# Patient Record
Sex: FEMALE | Race: WHITE | NOT HISPANIC OR LATINO | Employment: UNEMPLOYED | URBAN - METROPOLITAN AREA
[De-identification: names, ages, dates, MRNs, and addresses within clinical notes are randomized per-mention and may not be internally consistent; named-entity substitution may affect disease eponyms.]

---

## 2017-01-05 ENCOUNTER — TELEPHONE (OUTPATIENT)
Dept: MEDICAL GROUP | Facility: MEDICAL CENTER | Age: 57
End: 2017-01-05

## 2017-01-05 DIAGNOSIS — L40.9 PSORIASIS: ICD-10-CM

## 2017-01-05 RX ORDER — CLOBETASOL PROPIONATE 0.46 MG/ML
SOLUTION TOPICAL
Qty: 50 ML | Refills: 2 | Status: SHIPPED
Start: 2017-01-05 | End: 2018-02-15

## 2017-01-05 RX ORDER — CLOBETASOL PROPIONATE 0.05 G/100ML
1 SHAMPOO TOPICAL DAILY
Qty: 1 BOTTLE | Refills: 3 | Status: SHIPPED | OUTPATIENT
Start: 2017-01-05 | End: 2018-02-15

## 2017-01-06 NOTE — TELEPHONE ENCOUNTER
Please notify the patient that the clobetasol shampoo is not covered under insurance, I am going to try a different clobetasol formulation and send that prescription to her pharmacy

## 2017-01-06 NOTE — TELEPHONE ENCOUNTER
Pt says it is months before she can get into [facility]. Would like an Rx for something for her scalp. Says she tried the shampoo and was not satisfied. In the past has tried Clydamyacin and Clobet? Would be willing to try either of those topicals until she can get into [facility]. Please advise.

## 2017-02-15 PROBLEM — R73.01 IMPAIRED FASTING GLUCOSE: Status: ACTIVE | Noted: 2017-02-15

## 2017-02-16 ENCOUNTER — TELEPHONE (OUTPATIENT)
Dept: MEDICAL GROUP | Facility: MEDICAL CENTER | Age: 57
End: 2017-02-16

## 2017-02-16 NOTE — TELEPHONE ENCOUNTER
Spoke with [facility] and they did not receive specimen so they didn't run test. Pt will need to be redrawn.

## 2017-02-16 NOTE — TELEPHONE ENCOUNTER
----- Message from Jordan Rand M.D. sent at 2/15/2017  8:19 PM PST -----  Please call [facility], the patient had labs done on December 6, 2016, I had ordered a lipid panel but the result was not sent to us.  Could they please check if that test was done? Specimen number [id number], client number [id number]          Called patient with results for labs that she had done in December at [facility], apologized for the delay, because of a computer system which we were not forwarded the results to our in basket.    Liver enzymes mildly elevated, impaired fasting blood sugar related to weight, she would like to try weight loss medication, advised her to check if belviq is covered under her plan, she can schedule follow-up to discuss weight loss medication.  Work on nutrition, exercise.  Take vitamin D 2000 units daily.    We will need to call [facility] to see if the lipid panel was done, it had been ordered

## 2017-02-16 NOTE — TELEPHONE ENCOUNTER
Please notify patient that [facility] did not run the cholesterol specimen test.    Since her previous cholesterol panels have been excellent, we can hold off on repeating that for now, the next time she has blood drawn we can order the cholesterol test at that time.

## 2017-02-27 ENCOUNTER — TELEPHONE (OUTPATIENT)
Dept: MEDICAL GROUP | Facility: MEDICAL CENTER | Age: 57
End: 2017-02-27

## 2017-02-27 DIAGNOSIS — F32.A DEPRESSION, UNSPECIFIED DEPRESSION TYPE: Chronic | ICD-10-CM

## 2017-02-27 RX ORDER — BUPROPION HYDROCHLORIDE 150 MG/1
150 TABLET ORAL 2 TIMES DAILY
Qty: 60 TAB | Refills: 6 | Status: SHIPPED | OUTPATIENT
Start: 2017-02-27 | End: 2017-03-13

## 2017-02-27 NOTE — TELEPHONE ENCOUNTER
1. Caller Name: Ramona                      Call Back Number: [phone]    2. Message: Pt left message stating she has been taking Wellbutrin BID and wants to continue this dose. She would like refill sent to [facility].     3. Patient approves office to leave a detailed voicemail/MyChart message: N\A

## 2017-03-01 ENCOUNTER — TELEPHONE (OUTPATIENT)
Dept: MEDICAL GROUP | Facility: MEDICAL CENTER | Age: 57
End: 2017-03-01

## 2017-03-01 NOTE — TELEPHONE ENCOUNTER
need par please  (!) wellbutrin  mg bid  (2) #60 tab per 30 days  (3) diagnosis: depression  (4) ICD 10 code: F32.9  (5) comments: tried and failed celexa, effexor, has been on wellbutrin 150 mg with benefit initially started at 150 mg daily but has had benefit with 150 mg twice a day dosing

## 2017-03-12 NOTE — TELEPHONE ENCOUNTER
Letter from [employer] asking us to call [facility] pharmacy services at [phone] or fax [phone] to resubmit PAR request for Wellbutrin  Letter is on the counter

## 2017-03-13 ENCOUNTER — TELEPHONE (OUTPATIENT)
Dept: MEDICAL GROUP | Facility: MEDICAL CENTER | Age: 57
End: 2017-03-13

## 2017-03-13 DIAGNOSIS — F32.A DEPRESSION, UNSPECIFIED DEPRESSION TYPE: Chronic | ICD-10-CM

## 2017-03-13 RX ORDER — BUPROPION HYDROCHLORIDE 300 MG/1
300 TABLET ORAL EVERY MORNING
Qty: 90 TAB | Refills: 3 | Status: SHIPPED | OUTPATIENT
Start: 2017-03-13 | End: 2020-03-03

## 2017-03-14 NOTE — TELEPHONE ENCOUNTER
Please notify patient that her insurance does not cover bupropion  mg twice a day, they will cover bupropion  mg once a day, that prescription was sent to her pharmacy

## 2017-06-06 PROBLEM — L40.8 OTHER PSORIASIS: Status: ACTIVE | Noted: 2017-06-06

## 2017-11-20 ENCOUNTER — TELEPHONE (OUTPATIENT)
Dept: MEDICAL GROUP | Facility: MEDICAL CENTER | Age: 57
End: 2017-11-20

## 2017-11-20 DIAGNOSIS — F32.A DEPRESSION, UNSPECIFIED DEPRESSION TYPE: ICD-10-CM

## 2017-11-21 NOTE — TELEPHONE ENCOUNTER
1. Caller Name: Ramona Wilson                        Call Back Number: [phone] (home)       2. Message: Pt has decided that she wants to see mental Health. Would like a REF to Dr Navas @ [facility] on [address]     3. Patient approves office to leave a detailed voicemail/MyChart message: no

## 2018-01-05 ENCOUNTER — APPOINTMENT (OUTPATIENT)
Dept: RADIOLOGY | Facility: IMAGING CENTER | Age: 58
End: 2018-01-05
Attending: FAMILY MEDICINE
Payer: COMMERCIAL

## 2018-01-05 ENCOUNTER — OFFICE VISIT (OUTPATIENT)
Dept: URGENT CARE | Facility: CLINIC | Age: 58
End: 2018-01-05
Payer: COMMERCIAL

## 2018-01-05 VITALS
HEART RATE: 84 BPM | HEIGHT: 62 IN | TEMPERATURE: 97.6 F | OXYGEN SATURATION: 98 % | SYSTOLIC BLOOD PRESSURE: 128 MMHG | DIASTOLIC BLOOD PRESSURE: 76 MMHG | RESPIRATION RATE: 14 BRPM | WEIGHT: 193 LBS | BODY MASS INDEX: 35.51 KG/M2

## 2018-01-05 DIAGNOSIS — R50.9 FEVER, UNSPECIFIED FEVER CAUSE: ICD-10-CM

## 2018-01-05 LAB
APPEARANCE UR: NORMAL
BILIRUB UR STRIP-MCNC: NORMAL MG/DL
COLOR UR AUTO: YELLOW
FLUAV+FLUBV AG SPEC QL IA: NEGATIVE
GLUCOSE UR STRIP.AUTO-MCNC: NORMAL MG/DL
INT CON NEG: NORMAL
INT CON POS: NORMAL
KETONES UR STRIP.AUTO-MCNC: NORMAL MG/DL
LEUKOCYTE ESTERASE UR QL STRIP.AUTO: NORMAL
NITRITE UR QL STRIP.AUTO: NORMAL
PH UR STRIP.AUTO: 5 [PH] (ref 5–8)
PROT UR QL STRIP: NORMAL MG/DL
RBC UR QL AUTO: NORMAL
SP GR UR STRIP.AUTO: 1.01
UROBILINOGEN UR STRIP-MCNC: NORMAL MG/DL

## 2018-01-05 PROCEDURE — 81002 URINALYSIS NONAUTO W/O SCOPE: CPT | Performed by: FAMILY MEDICINE

## 2018-01-05 PROCEDURE — 99214 OFFICE O/P EST MOD 30 MIN: CPT | Performed by: FAMILY MEDICINE

## 2018-01-05 PROCEDURE — 71046 X-RAY EXAM CHEST 2 VIEWS: CPT | Mod: TC,FY | Performed by: FAMILY MEDICINE

## 2018-01-05 PROCEDURE — 87804 INFLUENZA ASSAY W/OPTIC: CPT | Performed by: FAMILY MEDICINE

## 2018-01-05 RX ORDER — CIPROFLOXACIN 500 MG/1
500 TABLET, FILM COATED ORAL 2 TIMES DAILY
Qty: 14 TAB | Refills: 0 | Status: SHIPPED | OUTPATIENT
Start: 2018-01-05 | End: 2018-01-12

## 2018-01-05 RX ORDER — DEXTROAMPHETAMINE SACCHARATE, AMPHETAMINE ASPARTATE, DEXTROAMPHETAMINE SULFATE AND AMPHETAMINE SULFATE 2.5; 2.5; 2.5; 2.5 MG/1; MG/1; MG/1; MG/1
5 TABLET ORAL 2 TIMES DAILY
COMMUNITY
End: 2019-01-10

## 2018-01-05 ASSESSMENT — ENCOUNTER SYMPTOMS
HEADACHES: 0
CHILLS: 1
FOCAL WEAKNESS: 0
FEVER: 1
DIZZINESS: 0
SORE THROAT: 1
COUGH: 0
SPUTUM PRODUCTION: 0

## 2018-01-05 NOTE — PROGRESS NOTES
Subjective:      Ramona Wilson is a 57 y.o. female who presents with Fever    Chief Complaint   Patient presents with   • Fever        - This is a very pleasant 57 y.o. female with complaints of on off subjective fever, aches malaise. No cough/coryza or urinary symptoms past few days but did have some cough w/ sputum today. She is worried about hepatitis, TB and other exotic infectious diseases and wishes to be worked up today b/c she cant get into her Dr's office.           ALLERGIES:  Amoxicillin     PMH:  Past Medical History:   Diagnosis Date   • Anxiety 4/21/2009   • Chronic low back pain 4/21/2009   • Dyslipidemia 4/21/2009   • Eczema 4/21/2009   • Hypovitaminosis D 4/23/2009   • RLS (restless legs syndrome)    • Sleep apnea    • Surveillance for birth control, oral contraceptives 4/21/2009        MEDS:    Current Outpatient Prescriptions:   •  amphetamine-dextroamphetamine (ADDERALL) 10 MG Tab, Take 5 mg by mouth 2 times a day., Disp: , Rfl:   •  ciprofloxacin (CIPRO) 500 MG Tab, Take 1 Tab by mouth 2 times a day for 7 days., Disp: 14 Tab, Rfl: 0  •  buPROPion (WELLBUTRIN XL) 300 MG XL tablet, Take 1 Tab by mouth every morning., Disp: 90 Tab, Rfl: 3  •  clobetasol (TEMOVATE) 0.05 % external solution, Apply thin film to dry scalp once daily (maximum dose: 50 g/week or 50 mL/week); leave in place for 15 minutes, then add water, lather; rinse thoroughly. Limit treatment to 4 consecutive weeks., Disp: 50 mL, Rfl: 2  •  Cholecalciferol (VITAMIN D) 1000 UNIT CAPS, Take  by mouth every day., Disp: , Rfl:   •  Clobetasol Propionate 0.05 % Shampoo, 1 Application by Apply externally route every day. As needed for scalp irritation, Disp: 1 Bottle, Rfl: 3  •  venlafaxine (EFFEXOR-XR) 150 MG extended-release capsule, Take 1 Cap by mouth every day., Disp: 90 Cap, Rfl: 3  •  ibuprofen (MOTRIN) 200 MG TABS, Take 400 mg by mouth every 6 hours as needed., Disp: , Rfl:     ** I have documented what I find to be significant  "in regards to past medical, social, family and surgical history  in my HPI or under PMH/PSH/FH review section, otherwise it is contributory **           HPI    Review of Systems   Constitutional: Positive for chills, fever and malaise/fatigue.   HENT: Positive for congestion and sore throat.    Respiratory: Negative for cough and sputum production.    Neurological: Negative for dizziness, focal weakness and headaches.          Objective:     /76   Pulse 84   Temp 36.4 °C (97.6 °F)   Resp 14   Ht 1.575 m (5' 2\")   Wt 87.5 kg (193 lb)   SpO2 98%   BMI 35.30 kg/m²      Physical Exam   Constitutional: She appears well-developed. No distress.   HENT:   Head: Normocephalic and atraumatic.   Mouth/Throat: Oropharynx is clear and moist.   Eyes: Conjunctivae are normal.   Neck: Neck supple.   Cardiovascular: Regular rhythm.    No murmur heard.  Pulmonary/Chest: Effort normal and breath sounds normal. No respiratory distress.   Abdominal: Soft. There is no tenderness.   Neurological: She is alert. She exhibits normal muscle tone.   Skin: Skin is warm and dry.   Psychiatric: She has a normal mood and affect. Judgment normal.   Nursing note and vitals reviewed.              Assessment/Plan:         1. Fever, unspecified fever cause  DX-CHEST-2 VIEWS    POCT Urinalysis    POCT Influenza A/B    URINE CULTURE(NEW)    CBC WITH DIFFERENTIAL    COMP METABOLIC PANEL    TSH    ciprofloxacin (CIPRO) 500 MG Tab       * I suspect UTI.       Dx & d/c instructions discussed w/ patient and/or family members. Follow up w/ Prvt Dr in 3 days if not getting better, sooner if needed,  ER if worse and UC/PCP unavailable.        Possible side effects (i.e. Rash, GI upset/constipation, sedation, elevation of BP or sugars) of any medications given discussed.                "

## 2018-01-13 ENCOUNTER — TELEPHONE (OUTPATIENT)
Dept: MEDICAL GROUP | Facility: MEDICAL CENTER | Age: 58
End: 2018-01-13

## 2018-01-13 RX ORDER — VENLAFAXINE HYDROCHLORIDE 150 MG/1
150 CAPSULE, EXTENDED RELEASE ORAL DAILY
Qty: 90 CAP | Refills: 0 | Status: SHIPPED | OUTPATIENT
Start: 2018-01-13 | End: 2018-02-15

## 2018-01-14 NOTE — TELEPHONE ENCOUNTER
Please notify patient she has not been seen since December 2016, have her schedule a follow-up annual exam

## 2018-01-16 ENCOUNTER — OFFICE VISIT (OUTPATIENT)
Dept: MEDICAL GROUP | Facility: MEDICAL CENTER | Age: 58
End: 2018-01-16
Payer: COMMERCIAL

## 2018-01-16 VITALS
OXYGEN SATURATION: 98 % | DIASTOLIC BLOOD PRESSURE: 90 MMHG | TEMPERATURE: 97.8 F | HEIGHT: 62 IN | BODY MASS INDEX: 35.04 KG/M2 | SYSTOLIC BLOOD PRESSURE: 134 MMHG | WEIGHT: 190.4 LBS | HEART RATE: 87 BPM

## 2018-01-16 DIAGNOSIS — R10.84 GENERALIZED ABDOMINAL DISCOMFORT: ICD-10-CM

## 2018-01-16 DIAGNOSIS — Z23 NEED FOR INFLUENZA VACCINATION: ICD-10-CM

## 2018-01-16 DIAGNOSIS — N39.0 ACUTE UTI (URINARY TRACT INFECTION): ICD-10-CM

## 2018-01-16 LAB
APPEARANCE UR: CLEAR
BILIRUB UR STRIP-MCNC: NORMAL MG/DL
COLOR UR AUTO: YELLOW
GLUCOSE UR STRIP.AUTO-MCNC: NEGATIVE MG/DL
KETONES UR STRIP.AUTO-MCNC: NEGATIVE MG/DL
LEUKOCYTE ESTERASE UR QL STRIP.AUTO: NORMAL
NITRITE UR QL STRIP.AUTO: NEGATIVE
PH UR STRIP.AUTO: 5 [PH] (ref 5–8)
PROT UR QL STRIP: NEGATIVE MG/DL
RBC UR QL AUTO: NORMAL
SP GR UR STRIP.AUTO: 1.02
UROBILINOGEN UR STRIP-MCNC: NEGATIVE MG/DL

## 2018-01-16 PROCEDURE — 90686 IIV4 VACC NO PRSV 0.5 ML IM: CPT | Performed by: NURSE PRACTITIONER

## 2018-01-16 PROCEDURE — 90471 IMMUNIZATION ADMIN: CPT | Performed by: NURSE PRACTITIONER

## 2018-01-16 PROCEDURE — 81002 URINALYSIS NONAUTO W/O SCOPE: CPT | Performed by: NURSE PRACTITIONER

## 2018-01-16 PROCEDURE — 99214 OFFICE O/P EST MOD 30 MIN: CPT | Mod: 25 | Performed by: NURSE PRACTITIONER

## 2018-01-16 RX ORDER — SULFAMETHOXAZOLE AND TRIMETHOPRIM 800; 160 MG/1; MG/1
1 TABLET ORAL 2 TIMES DAILY
Qty: 10 TAB | Refills: 0 | Status: SHIPPED | OUTPATIENT
Start: 2018-01-16 | End: 2018-02-15

## 2018-01-16 NOTE — PROGRESS NOTES
Subjective:     Chief Complaint   Patient presents with   • Sweats     worried about liver enzymes   • Follow-Up     urine     Ramona Wilson is a 57 y.o. female established patient of Dr. Rand here to follow-up on recent urgent care visit. She was seen in urgent care January 5 with generalized abdominal discomfort, treated for suspected UTI with Cipro. She has had some improvement in symptoms although she does not feel quite back to normal. She is still having periods of sweating and chills. She states that she's had elevated liver enzymes in the past and was concerned about this. She no longer has any abdominal pain. She is eating and drinking normally. No dysuria, hematuria, nausea, vomiting. No fatigue or unusual bruising. Culture results reviewed, no significant growth.  She has lab orders not yet completed including liver enzymes.   She would like influenza vaccine     Current medicines (including changes today)  Current Outpatient Prescriptions   Medication Sig Dispense Refill   • sulfamethoxazole-trimethoprim (BACTRIM DS) 800-160 MG tablet Take 1 Tab by mouth 2 times a day. 10 Tab 0   • amphetamine-dextroamphetamine (ADDERALL) 10 MG Tab Take 5 mg by mouth 2 times a day.     • buPROPion (WELLBUTRIN XL) 300 MG XL tablet Take 1 Tab by mouth every morning. 90 Tab 3   • Cholecalciferol (VITAMIN D) 1000 UNIT CAPS Take  by mouth every day.     • venlafaxine (EFFEXOR-XR) 150 MG extended-release capsule Take 1 Cap by mouth every day. 90 Cap 0   • Clobetasol Propionate 0.05 % Shampoo 1 Application by Apply externally route every day. As needed for scalp irritation 1 Bottle 3   • clobetasol (TEMOVATE) 0.05 % external solution Apply thin film to dry scalp once daily (maximum dose: 50 g/week or 50 mL/week); leave in place for 15 minutes, then add water, lather; rinse thoroughly. Limit treatment to 4 consecutive weeks. 50 mL 2   • ibuprofen (MOTRIN) 200 MG TABS Take 400 mg by mouth every 6 hours as needed.    "    No current facility-administered medications for this visit.      She  has a past medical history of Anxiety (4/21/2009); Chronic low back pain (4/21/2009); Dyslipidemia (4/21/2009); Eczema (4/21/2009); Hypovitaminosis D (4/23/2009); RLS (restless legs syndrome); Sleep apnea; and Surveillance for birth control, oral contraceptives (4/21/2009).    ROS included above     Objective:     Blood pressure 134/90, pulse 87, temperature 36.6 °C (97.8 °F), height 1.575 m (5' 2\"), weight 86.4 kg (190 lb 6.4 oz), SpO2 98 %, not currently breastfeeding. Body mass index is 34.82 kg/m².     Physical Exam:  General: Alert, oriented in no acute distress.  Eye contact is good, speech is normal, affect calm  Lungs: clear to auscultation bilaterally, normal effort, no wheeze/ rhonchi/ rales.  CV: regular rate and rhythm, S1, S2, no murmur  Abdomen: soft, nontender, No CVAT, central obesity  Ext: no edema, color normal, vascularity normal, temperature normal    Assessment and Plan:   The following treatment plan was discussed   1. Acute UTI (urinary tract infection)  Initially seen Jan 5th and treated with cipro for suspected UTI, culture was negative. UA today with blood, leukocytes, bilirubin. Will start bactrim to cover for potential UTI, send urine for culture. She will complete labs as previously ordered to include liver enzymes. F/u pending culture   2. Generalized abdominal discomfort  POCT Urinalysis    URINE CULTURE(NEW)   3. Need for influenza vaccination  I have placed the below orders and discussed them with an approved delegating provider. The MA is performing the below orders under the direction of Dr. Boyle  Flu Quad Inj >3 Year Pre-Filled PF       Followup: pending labs         Please note that this dictation was created using voice recognition software. I have worked with consultants from the vendor as well as technical experts from [facility] to optimize the interface. I have made every reasonable attempt to " correct obvious errors, but I expect that there are errors of grammar and possibly content that I did not discover before finalizing the note.

## 2018-01-26 ENCOUNTER — TELEPHONE (OUTPATIENT)
Dept: MEDICAL GROUP | Facility: MEDICAL CENTER | Age: 58
End: 2018-01-26

## 2018-01-26 DIAGNOSIS — R94.5 ABNORMAL LIVER FUNCTION: Chronic | ICD-10-CM

## 2018-01-26 NOTE — TELEPHONE ENCOUNTER
Those were ordered by urgent care. One of her liver tests showed minimal liver inflammation and the rest of her tests were normal.     I have not seen her in over a year, she can schedule an annual exam appointment.

## 2018-01-26 NOTE — TELEPHONE ENCOUNTER
Please inform pt urine culture did not show infection  If still having discomfort I would recommend we check lab work and get her back in for reevaluation

## 2018-01-26 NOTE — TELEPHONE ENCOUNTER
Pt notified. She would like results for the rest of her results. Results requested and are being faxed. She will make an appt next week if still having discomfort.

## 2018-01-26 NOTE — TELEPHONE ENCOUNTER
1. Caller Name: Ramona Wilson                        Call Back Number: [phone] (home)       2. Message: Pt called for lab and urine results. Urine in chart, no labs.     3. Patient approves office to leave a detailed voicemail/MyChart message: no

## 2018-01-27 ENCOUNTER — TELEPHONE (OUTPATIENT)
Dept: URGENT CARE | Facility: CLINIC | Age: 58
End: 2018-01-27

## 2018-01-27 NOTE — TELEPHONE ENCOUNTER
Call (document call) and let patient know labs for most part were in normal range, slight elevation in liver enzymes, could be nonspecific. F/u w/ PCP as planned to look into this further

## 2018-01-29 ENCOUNTER — TELEPHONE (OUTPATIENT)
Dept: URGENT CARE | Facility: CLINIC | Age: 58
End: 2018-01-29

## 2018-01-29 NOTE — TELEPHONE ENCOUNTER
Call (document call) and let patient know labs for most part were in normal range, slight elevation in liver enzymes, could be nonspecific. F/u w/ PCP as planned to look into this further       Pt states understanding

## 2018-01-29 NOTE — TELEPHONE ENCOUNTER
lvm       Call (document call) and let patient know labs for most part were in normal range, slight elevation in liver enzymes, could be nonspecific. F/u w/ PCP as planned to look into this further

## 2018-02-15 ENCOUNTER — HOSPITAL ENCOUNTER (OUTPATIENT)
Facility: MEDICAL CENTER | Age: 58
End: 2018-02-15
Attending: INTERNAL MEDICINE
Payer: COMMERCIAL

## 2018-02-15 ENCOUNTER — OFFICE VISIT (OUTPATIENT)
Dept: MEDICAL GROUP | Facility: MEDICAL CENTER | Age: 58
End: 2018-02-15
Payer: COMMERCIAL

## 2018-02-15 VITALS
TEMPERATURE: 98.3 F | BODY MASS INDEX: 34.41 KG/M2 | HEART RATE: 80 BPM | WEIGHT: 187 LBS | HEIGHT: 62 IN | SYSTOLIC BLOOD PRESSURE: 126 MMHG | DIASTOLIC BLOOD PRESSURE: 74 MMHG | OXYGEN SATURATION: 97 %

## 2018-02-15 DIAGNOSIS — E55.9 VITAMIN D DEFICIENCY: ICD-10-CM

## 2018-02-15 DIAGNOSIS — R73.01 IFG (IMPAIRED FASTING GLUCOSE): ICD-10-CM

## 2018-02-15 DIAGNOSIS — Z12.31 ENCOUNTER FOR SCREENING MAMMOGRAM FOR BREAST CANCER: ICD-10-CM

## 2018-02-15 DIAGNOSIS — F32.A DEPRESSION, UNSPECIFIED DEPRESSION TYPE: Chronic | ICD-10-CM

## 2018-02-15 DIAGNOSIS — E53.8 B12 DEFICIENCY: ICD-10-CM

## 2018-02-15 DIAGNOSIS — Z86.69 HISTORY OF SLEEP APNEA: ICD-10-CM

## 2018-02-15 DIAGNOSIS — Z00.00 PREVENTATIVE HEALTH CARE: Chronic | ICD-10-CM

## 2018-02-15 DIAGNOSIS — L65.9 HAIR THINNING: ICD-10-CM

## 2018-02-15 DIAGNOSIS — R35.0 URINARY FREQUENCY: ICD-10-CM

## 2018-02-15 DIAGNOSIS — E66.9 CLASS 1 OBESITY WITHOUT SERIOUS COMORBIDITY WITH BODY MASS INDEX (BMI) OF 34.0 TO 34.9 IN ADULT, UNSPECIFIED OBESITY TYPE: ICD-10-CM

## 2018-02-15 DIAGNOSIS — Z12.11 COLON CANCER SCREENING: ICD-10-CM

## 2018-02-15 DIAGNOSIS — E78.5 DYSLIPIDEMIA: Chronic | ICD-10-CM

## 2018-02-15 DIAGNOSIS — R10.32 LEFT LOWER QUADRANT PAIN: ICD-10-CM

## 2018-02-15 DIAGNOSIS — E51.9 VITAMIN B1 DEFICIENCY: ICD-10-CM

## 2018-02-15 DIAGNOSIS — M81.0 POSTMENOPAUSAL BONE LOSS: ICD-10-CM

## 2018-02-15 LAB
APPEARANCE UR: ABNORMAL
BILIRUB UR STRIP-MCNC: ABNORMAL MG/DL
COLOR UR AUTO: ABNORMAL
GLUCOSE UR STRIP.AUTO-MCNC: ABNORMAL MG/DL
KETONES UR STRIP.AUTO-MCNC: ABNORMAL MG/DL
LEUKOCYTE ESTERASE UR QL STRIP.AUTO: ABNORMAL
NITRITE UR QL STRIP.AUTO: ABNORMAL
PH UR STRIP.AUTO: 5 [PH] (ref 5–8)
PROT UR QL STRIP: ABNORMAL MG/DL
RBC UR QL AUTO: ABNORMAL
SP GR UR STRIP.AUTO: 1.02
UROBILINOGEN UR STRIP-MCNC: ABNORMAL MG/DL

## 2018-02-15 PROCEDURE — 99396 PREV VISIT EST AGE 40-64: CPT | Performed by: INTERNAL MEDICINE

## 2018-02-15 PROCEDURE — 81002 URINALYSIS NONAUTO W/O SCOPE: CPT | Performed by: INTERNAL MEDICINE

## 2018-02-15 ASSESSMENT — ENCOUNTER SYMPTOMS
INSOMNIA: 0
SHORTNESS OF BREATH: 0
BLURRED VISION: 0
ORTHOPNEA: 0
DOUBLE VISION: 0
CHILLS: 0
DEPRESSION: 0
FEVER: 0
ABDOMINAL PAIN: 0
COUGH: 0
DIARRHEA: 0
BACK PAIN: 0
HEARTBURN: 0
DIZZINESS: 0

## 2018-02-15 ASSESSMENT — PATIENT HEALTH QUESTIONNAIRE - PHQ9
5. POOR APPETITE OR OVEREATING: 0 - NOT AT ALL
SUM OF ALL RESPONSES TO PHQ QUESTIONS 1-9: 8
CLINICAL INTERPRETATION OF PHQ2 SCORE: 5

## 2018-02-16 NOTE — PROGRESS NOTES
Subjective:      Ramona Wilson is a 57 y.o. female who presents with Annual Exam            HPI       Here for annual exam  Takes mvi and vit d 2000 units daily  Recently has cold and given course of cipro for some type of infection, urinalysis was negative as well as culture, patient has had no fevers, chills, sore throat, cough.  No dysuria, occasional urinary urgency at times.  Has had occasional spasm left lower quadrant at times, no radiation to the back, no constipation, no nausea, vomiting, diarrhea.  History of ovarian cyst, has seen gynecology.  No abnormal vaginal bleeding or discharge.  Postmenopausal.  Some hair thinning, no hormone replacement therapy, no hot flashes.  No mood changes.   Sees psychiatry on wellbutrin, Adderall, Zoloft for depression which has been stable  SH , not working, has three sons one autistic, drinks water, no soda, no tobacco, rare etoh  Family history no changes, osteoporosis in mother and sister  Medications, allergies, medical history, surgical history, social history, family history reviewed and updated  Not using cpap, states no sleep apnea currently          Current Outpatient Prescriptions   Medication Sig Dispense Refill   • SERTRALINE HCL PO Take  by mouth.     • amphetamine-dextroamphetamine (ADDERALL) 10 MG Tab Take 5 mg by mouth 2 times a day.     • buPROPion (WELLBUTRIN XL) 300 MG XL tablet Take 1 Tab by mouth every morning. 90 Tab 3   • Cholecalciferol (VITAMIN D) 1000 UNIT CAPS Take  by mouth every day.       No current facility-administered medications for this visit.      Patient Active Problem List   Diagnosis   • Chronic low back pain   • Dyslipidemia   • Depression   • Preventative health care   • Sleep apnea   • S/P cholecystectomy   • Pain of left heel   • Eagle's syndrome   • Elbow fracture, left   • Abnormal liver function   • Obesity   • Sacrococcygeal pain   • Cyst of ovary, right   • FH: breast cancer   • Impaired fasting glucose      Depression Screening    Little interest or pleasure in doing things?  2 - more than half the days  Feeling down, depressed , or hopeless? 3 - nearly every day  Trouble falling or staying asleep, or sleeping too much?  0 - not at all  Feeling tired or having little energy?  0 - not at all  Poor appetite or overeating?  0 - not at all  Feeling bad about yourself - or that you are a failure or have let yourself or your family down? 1 - several days  Trouble concentrating on things, such as reading the newspaper or watching television? 2 - more than half the days  Moving or speaking so slowly that other people could have noticed.  Or the opposite - being so fidgety or restless that you have been moving around a lot more than usual?  0 - not at all  Thoughts that you would be better off dead, or of hurting yourself?  0 - not at all  Patient Health Questionnaire Score: 8    If depressive symptoms identified deferred to follow up visit unless specifically addressed in assessment and plan.      Health Maintenance Summary                COLONOSCOPY Overdue 5/21/2015      Done 5/21/2012 AMB REFERRAL TO GI FOR COLONOSCOPY    MAMMOGRAM Overdue 12/21/2016      Done 12/21/2015      Patient has more history with this topic...    PAP SMEAR Next Due 12/15/2018      Done 12/15/2015 PAP SENT TO [facility].     Patient has more history with this topic...    IMM DTaP/Tdap/Td Vaccine Next Due 9/30/2025      Done 9/30/2015 Imm Admin: Tdap Vaccine          Sleep apnea  10/15/10 positive apnea link formal eval pending   9/3/15 PMA sleep note long history of snoring, daytime somnolence, will be set up for polysomnogram evaluate for sleep apnea.  11/30/15 sleep study, CPAP was adjusted between 4 and 8, recommend bilevel  11/30/15 sleep study 2-27 minutes total, AHI 0.4, hypopnea index 26, supine AHI 27, mean saturation 93%, minimum saturation 86%, saturations below 89% for 0.9% of sleep time, cpap 4-8 AHI 21.7, mean saturation 93% with minimum  saturation 87%  12/18/15 PMA sleep note repeat cpap titration  2/17/16 PMA sleep note, start autocpap 9-18, cnox after acclimated     Sacral pain  9/30/15 xray sacrum and coccyx slight posterior subluxation of the second coccygeal segment which may represent sequela of old injury,no acute sacral or coccygeal fracture,mild degenerative changes of each SI joint  9/30/15 referral to physical therapy     Status post cholecystectomy     Preventative health  5/21/12 colon per [facility] polyp pathology inflammatory, repeat in 10 years  9/30/15 tdap  12/10/15 mammogram  12/15/15 pap negative per gyn  12/6/16 vit d 27 done at [facility] start 2000 units daily     Left heel spur     Elbow fracture  1/21/15 x-ray left elbow radial head fracture  3/19/15  orthopedic note, x-ray elbow no displacement, healed left radial head fracture     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 Would need referral ENT     Dyslipidemia  4/09 chol 150,trig, 258, hdl 30, ldl 68  10/10 chol 149,trig 256,hdl 38,ldl 60 start fish oil 2 bid  1/9/14 chol 179,trig 186,hdl 42,ldl 100  9/11/15 chol 175,trig 185,hdl 54,ldl 84     Depression  On effexor since 2006, failed celexa  10/10 increase effexor xr to 150 mg and add ativan 0.5 mg prn   10/7/13 continue effexor  mg, add wellbutrin 150 mg daily, PHQ score 17 declined psychotherapy  2/27/17 on effexor 150  mg and wellbutrin 150 mg bid  3/13/17 insurance not cover wellbutrin  mg bid, change to 300 mg qday  11/20/17 referral  at [facility]     Cyst ovary  11/23/15 ultrasound gynecologic, prominent endometrial stripe, 3.3 cm right ovarian cyst     Chronic low back pain  5/07 MRI lumbar spine L5-S1 small disc protrusion, L4-L5 broad-based disc   8/25/16  pain note left sacroiliac joint dysfunction, provided left sacroiliac steroidal joint  "injection under fluoroscopy  9/27/16  pain management procedure note, left L5 dorsal ramus and left S1-S3 nerve radiofrequency ablation under fluoroscopy  10/18/16  pain note s/p left L5S3 FJNA on 9/27/16 80% improvement in symptoms, recommend pennsaid 2% topical, stop naproxen, trial physical therapy, consider ganglion nerve block    Abnormal liver enzymes  1/9/14 AST 76,,alk 70,bili 0.6  5/9/14 AST 31,ALT 49,GGT 11,iron panel normal, hepatitis panel negative,SHIMON negative  9/11/15 AST 20,ALT 36,alk phos 68,bili 0.4,GGT 13, iron 102,%sat 25,AMA neg,actin IgG negative,SPEP neg  12/6/16 AST 38,ALT 59 done at [facility]  1/16/18 AST 30,ALT 43 done at [facility]          Patient Care Team:  Jordan Rand M.D. as PCP - General      Review of Systems   Constitutional: Negative for chills and fever.   HENT: Negative for hearing loss.    Eyes: Negative for blurred vision and double vision.   Respiratory: Negative for cough and shortness of breath.    Cardiovascular: Negative for chest pain and orthopnea.   Gastrointestinal: Negative for abdominal pain, diarrhea and heartburn.   Genitourinary: Negative for dysuria.   Musculoskeletal: Negative for back pain.   Skin: Negative for rash.   Neurological: Negative for dizziness.   Psychiatric/Behavioral: Negative for depression. The patient does not have insomnia.           Objective:     /74   Pulse 80   Temp 36.8 °C (98.3 °F)   Ht 1.575 m (5' 2\")   Wt 84.8 kg (187 lb)   SpO2 97%   BMI 34.20 kg/m²      Physical Exam   Constitutional: She appears well-developed and well-nourished. No distress.   HENT:   Head: Normocephalic and atraumatic.   Right Ear: External ear normal.   Left Ear: External ear normal.   Nose: Nose normal.   Mouth/Throat: Oropharynx is clear and moist.   Eyes: Conjunctivae are normal. Right eye exhibits no discharge. Left eye exhibits no discharge.   Neck: Neck supple. No JVD present.   Cardiovascular: Normal rate, regular " rhythm and normal heart sounds.    Pulmonary/Chest: Effort normal and breath sounds normal. No respiratory distress. She has no wheezes.   Abdominal: She exhibits no distension.   Musculoskeletal: She exhibits no edema.   Neurological: She is alert.   Skin: Skin is warm. She is not diaphoretic.   Psychiatric: She has a normal mood and affect. Her behavior is normal.   Nursing note and vitals reviewed.              Assessment/Plan:     Assessment  #! Annual exam    #2 depression followed followed by psychiatry stable on Zoloft, Wellbutrin, Adderall    #3 BMI 34.2 has declined nutritional counseling, has not been exercising    #4 history of right ovarian cyst    #5 left lower abdomen discomfort, no GI symptoms    #6 recent cold, given Cipro through urgent care, occasional urinary frequency, no burning    #7 hair thinning       Plan  #! Mammogram and dexa     #2 UA repeat ordered    #3 pap due later this year    #4 no sleep apnea, no need for follow-up    #5 FIT card and instructions provided    #6 ultrasound gynecologic evaluate for ovarian cyst     #7 nutrition, diet, exercise, weight loss discussed, declines nutritionist    #8 start exercising daily, 2 days a week with weights, her son is a  who can help her    #9 labs    #10 follow-up psychiatry    #11 follow-up 6 months, sooner if left lower quadrant discomfort persists or worsens

## 2018-03-01 ENCOUNTER — TELEPHONE (OUTPATIENT)
Dept: MEDICAL GROUP | Facility: MEDICAL CENTER | Age: 58
End: 2018-03-01

## 2018-03-01 NOTE — TELEPHONE ENCOUNTER
1. Caller Name: Ramona Wilson                        Call Back Number: [phone] (home)       2. Message: Pt called requesting that she be advised of culture results from [facility] as soon as possible. Called [facility] and they are faxing.     3. Patient approves office to leave a detailed voicemail/MyChart message: yes

## 2018-03-02 NOTE — TELEPHONE ENCOUNTER
Urine test results in computer media, no evidence of infection, notify patient    She states she had blood test done on February 16 at [facility], I cannot find those results, please have [facility] fax the results directly to [phone] from the blood test that she just had done a few weeks ago.  This is not the blood test she had in January, she had a separate blood test done February 16 or around that time.

## 2018-03-07 ENCOUNTER — HOSPITAL ENCOUNTER (OUTPATIENT)
Dept: RADIOLOGY | Facility: MEDICAL CENTER | Age: 58
End: 2018-03-07
Attending: INTERNAL MEDICINE
Payer: COMMERCIAL

## 2018-03-07 DIAGNOSIS — Z12.31 ENCOUNTER FOR SCREENING MAMMOGRAM FOR BREAST CANCER: ICD-10-CM

## 2018-03-07 DIAGNOSIS — M81.0 POSTMENOPAUSAL BONE LOSS: ICD-10-CM

## 2018-03-07 DIAGNOSIS — R10.32 LEFT LOWER QUADRANT PAIN: ICD-10-CM

## 2018-03-07 PROCEDURE — 77067 SCR MAMMO BI INCL CAD: CPT

## 2018-03-07 PROCEDURE — 76830 TRANSVAGINAL US NON-OB: CPT

## 2018-03-07 PROCEDURE — 77080 DXA BONE DENSITY AXIAL: CPT

## 2018-09-13 ENCOUNTER — TELEPHONE (OUTPATIENT)
Dept: MEDICAL GROUP | Facility: MEDICAL CENTER | Age: 58
End: 2018-09-13

## 2018-09-13 NOTE — TELEPHONE ENCOUNTER
1. Caller Name: Ramona Wilson                        Call Back Number: [phone] (home)       2. Message: Pt left a message that [facility] would not process her FIT test and that she needed to do it through [facility]. Printed order in chart and faxed to [email or web address]. Pt notified.     3. Patient approves office to leave a detailed voicemail/MyChart message: yes

## 2018-11-12 NOTE — TELEPHONE ENCOUNTER
Spoke with patient, she did get the test done at [facility]. Rani, can you please request records.

## 2018-11-12 NOTE — TELEPHONE ENCOUNTER
1. Caller Name: Ramona                                           Call Back Number: [phone] (home)         Patient approves a detailed voicemail message: N\A    Pt called requesting results from this FIT test

## 2018-11-12 NOTE — TELEPHONE ENCOUNTER
Please notify patient we have not received her stool result, if she had the FIT done at [facility] we will need to contact [facility] for those results.  It is not in the computer system.

## 2018-11-26 ENCOUNTER — TELEPHONE (OUTPATIENT)
Dept: MEDICAL GROUP | Facility: MEDICAL CENTER | Age: 58
End: 2018-11-26

## 2018-12-18 ENCOUNTER — OFFICE VISIT (OUTPATIENT)
Dept: MEDICAL GROUP | Age: 58
End: 2018-12-18
Payer: COMMERCIAL

## 2018-12-18 VITALS
HEART RATE: 81 BPM | BODY MASS INDEX: 35.7 KG/M2 | DIASTOLIC BLOOD PRESSURE: 86 MMHG | SYSTOLIC BLOOD PRESSURE: 134 MMHG | OXYGEN SATURATION: 96 % | HEIGHT: 62 IN | WEIGHT: 194 LBS | TEMPERATURE: 98.5 F

## 2018-12-18 DIAGNOSIS — J02.9 SORE THROAT: ICD-10-CM

## 2018-12-18 DIAGNOSIS — N30.00 ACUTE CYSTITIS WITHOUT HEMATURIA: ICD-10-CM

## 2018-12-18 DIAGNOSIS — R52 GENERALIZED BODY ACHES: ICD-10-CM

## 2018-12-18 LAB
APPEARANCE UR: CLEAR
BILIRUB UR STRIP-MCNC: NORMAL MG/DL
COLOR UR AUTO: YELLOW
GLUCOSE UR STRIP.AUTO-MCNC: NORMAL MG/DL
INT CON NEG: NEGATIVE
INT CON POS: POSITIVE
KETONES UR STRIP.AUTO-MCNC: NORMAL MG/DL
LEUKOCYTE ESTERASE UR QL STRIP.AUTO: NORMAL
NITRITE UR QL STRIP.AUTO: NORMAL
PH UR STRIP.AUTO: 6 [PH] (ref 5–8)
PROT UR QL STRIP: NORMAL MG/DL
RBC UR QL AUTO: NORMAL
S PYO AG THROAT QL: NORMAL
SP GR UR STRIP.AUTO: 1.02
UROBILINOGEN UR STRIP-MCNC: 0.2 MG/DL

## 2018-12-18 PROCEDURE — 99214 OFFICE O/P EST MOD 30 MIN: CPT | Performed by: FAMILY MEDICINE

## 2018-12-18 PROCEDURE — 81002 URINALYSIS NONAUTO W/O SCOPE: CPT | Performed by: FAMILY MEDICINE

## 2018-12-18 PROCEDURE — 87880 STREP A ASSAY W/OPTIC: CPT | Performed by: FAMILY MEDICINE

## 2018-12-18 RX ORDER — NITROFURANTOIN 25; 75 MG/1; MG/1
100 CAPSULE ORAL 2 TIMES DAILY
Qty: 20 CAP | Refills: 0 | Status: SHIPPED | OUTPATIENT
Start: 2018-12-18 | End: 2019-01-10

## 2018-12-18 NOTE — ASSESSMENT & PLAN NOTE
NEW PROBLEM    Patient is a 57-year-old female who presents to clinic with a chief complaint of feeling weak, ill, sore throat, feeling feverish, increased urinary frequency.  Although she did not measure her temperature, she did feel warm.  She states that this happened last year and it turns out she had blood in her urine with an unknown etiology of infection she states.  This has been going on for 1 week or so.  She is able to tolerate p.o. no abdominal pain no nausea no vomiting.  Denies any neck stiffness no neck pain, no numbness or tingling no headaches no change in vision.  She denies any sick contacts.

## 2018-12-18 NOTE — ASSESSMENT & PLAN NOTE
NEW PROBLEM    Patient is a 57-year-old female who presents to clinic with a chief complaint of feeling weak, ill, sore throat, feeling feverish although she did not measure her temperature.  She states that this happened last year and it turns out she had blood in her urine with an unknown etiology of infection she states.  This has been going on for 1 week or so.  She is able to tolerate p.o. no abdominal pain no nausea no vomiting.  Denies any neck stiffness no neck pain, no numbness or tingling no headaches no change in vision.  She denies any sick contacts.

## 2018-12-18 NOTE — PROGRESS NOTES
This medical record contains text that has been entered with the assistance of computer voice recognition and dictation software.  Therefore, it may contain unintended errors in text, spelling, punctuation, or grammar    Chief Complaint   Patient presents with   • Generalized Body Aches     x4 days   • Fatigue   • Nasal Congestion         Ramona Wilson is a 57 y.o. female here evaluation and management of: Fatigue      HPI:     Acute cystitis  NEW PROBLEM    Patient is a 57-year-old female who presents to clinic with a chief complaint of feeling weak, ill, sore throat, feeling feverish, increased urinary frequency.  Although she did not measure her temperature, she did feel warm.  She states that this happened last year and it turns out she had blood in her urine with an unknown etiology of infection she states.  This has been going on for 1 week or so.  She is able to tolerate p.o. no abdominal pain no nausea no vomiting.  Denies any neck stiffness no neck pain, no numbness or tingling no headaches no change in vision.  She denies any sick contacts.    Current medicines (including changes today)  Current Outpatient Prescriptions   Medication Sig Dispense Refill   • Multiple Vitamins-Minerals (MULTIVITAMIN ADULT PO) Take  by mouth.     • nitrofurantoin monohydr macro (MACROBID) 100 MG Cap Take 1 Cap by mouth 2 times a day. 20 Cap 0   • buPROPion (WELLBUTRIN XL) 300 MG XL tablet Take 1 Tab by mouth every morning. 90 Tab 3   • Cholecalciferol (VITAMIN D) 1000 UNIT CAPS Take  by mouth every day.     • SERTRALINE HCL PO Take  by mouth.     • amphetamine-dextroamphetamine (ADDERALL) 10 MG Tab Take 5 mg by mouth 2 times a day.       No current facility-administered medications for this visit.      She  has a past medical history of Anxiety (4/21/2009); Chronic low back pain (4/21/2009); Dyslipidemia (4/21/2009); Eczema (4/21/2009); Hypovitaminosis D (4/23/2009); RLS (restless legs syndrome); Sleep apnea; and  "Surveillance for birth control, oral contraceptives (2009).  She  has a past surgical history that includes primary c section (); lynne by laparoscopy (); and mammoplasty reduction (2010).  Social History   Substance Use Topics   • Smoking status: Never Smoker   • Smokeless tobacco: Never Used   • Alcohol use No      Comment: occ     Social History     Social History Narrative   • No narrative on file     Family History   Problem Relation Age of Onset   • Cancer Mother         lung cancer,osteoporosis   • Hypertension Brother    • Diabetes Father    • Cancer Maternal Aunt    • Cancer Maternal Grandmother    • Alcohol/Drug Maternal Grandfather    • Stroke Paternal Grandfather    • Cancer Maternal Aunt      Family Status   Relation Status   • Mo    • Bro Alive   • Fa Alive   • Sis Alive   • Bro Alive   • MAunt    • MGMo    • MGFa    • PGMo    • PGFa    • MAunt          ROS    Please see hpi     All other systems reviewed and are negative     Objective:     Blood pressure 134/86, pulse 81, temperature 36.9 °C (98.5 °F), temperature source Temporal, height 1.575 m (5' 2\"), weight 88 kg (194 lb), SpO2 96 %, not currently breastfeeding. Body mass index is 35.48 kg/m².  Physical Exam:    Constitutional: Alert, no distress.  Skin: Warm, dry, good turgor, no rashes in visible areas.  Eye: Equal, round and reactive, conjunctiva clear, lids normal.  ENMT: Lips without lesions, good dentition, oropharynx clear.  Neck: Trachea midline, no masses, no thyromegaly. No cervical or supraclavicular lymphadenopathy.  Respiratory: Unlabored respiratory effort, lungs clear to auscultation, no wheezes, no ronchi.  Cardiovascular: Normal S1, S2, no murmur, no edema.  Abdomen: Soft, non-tender, no masses, no hepatosplenomegaly.  Psych: Alert and oriented x3, normal affect and mood.          Assessment and Plan:   The following treatment plan was discussed      1. " Acute cystitis without hematuria      Also educated patient on Abstinence or a decrease or elimination of the usage of spermicide-containing products would be expected to reduce the risk of UTI. Also suggested  early postcoital voiding and more liberal fluid intake to increase, trying cranberry juice will not be harmful although studies have not proven its effectiveness. Wipe front to back, and do not hold in urine.      - nitrofurantoin monohydr macro (MACROBID) 100 MG Cap; Take 1 Cap by mouth 2 times a day.  Dispense: 20 Cap; Refill: 0          - URINALYSIS,CULTURE IF INDICATED; Future  - CBC WITH DIFFERENTIAL; Future    2. Sore throat    - POCT Rapid Strep A          HEALTH MAINTENANCE:    Instructed to Follow up in clinic or ER for worsening symptoms, difficulty breathing, lack of expected recovery, or should new symptoms or problems arise.    Followup: Return in about 3 months (around 3/18/2019) for Reevaluation.       Once again this medical record contains text that has been entered with the assistance of computer voice recognition and dictation software.  Therefore, it may contain unintended errors in text, spelling, punctuation, or grammar

## 2018-12-20 NOTE — PROGRESS NOTES
I discussed results and plan with patient, who stated understanding.       Rich House MD  [facility], [address] [address]

## 2019-01-10 ENCOUNTER — OFFICE VISIT (OUTPATIENT)
Dept: MEDICAL GROUP | Facility: MEDICAL CENTER | Age: 59
End: 2019-01-10
Payer: COMMERCIAL

## 2019-01-10 ENCOUNTER — HOSPITAL ENCOUNTER (OUTPATIENT)
Facility: MEDICAL CENTER | Age: 59
End: 2019-01-10
Attending: FAMILY MEDICINE
Payer: COMMERCIAL

## 2019-01-10 ENCOUNTER — NON-PROVIDER VISIT (OUTPATIENT)
Dept: MEDICAL GROUP | Facility: MEDICAL CENTER | Age: 59
End: 2019-01-10
Payer: COMMERCIAL

## 2019-01-10 VITALS
SYSTOLIC BLOOD PRESSURE: 110 MMHG | WEIGHT: 195.2 LBS | OXYGEN SATURATION: 96 % | HEIGHT: 62 IN | TEMPERATURE: 97.6 F | HEART RATE: 74 BPM | DIASTOLIC BLOOD PRESSURE: 70 MMHG | BODY MASS INDEX: 35.92 KG/M2

## 2019-01-10 DIAGNOSIS — Z78.0 POSTMENOPAUSAL: ICD-10-CM

## 2019-01-10 DIAGNOSIS — Z23 NEED FOR VACCINATION: ICD-10-CM

## 2019-01-10 DIAGNOSIS — R31.21 ASYMPTOMATIC MICROSCOPIC HEMATURIA: ICD-10-CM

## 2019-01-10 DIAGNOSIS — Z87.440 HISTORY OF ACUTE CYSTITIS: ICD-10-CM

## 2019-01-10 LAB
APPEARANCE UR: NORMAL
BILIRUB UR STRIP-MCNC: NEGATIVE MG/DL
COLOR UR AUTO: YELLOW
GLUCOSE UR STRIP.AUTO-MCNC: NEGATIVE MG/DL
KETONES UR STRIP.AUTO-MCNC: NEGATIVE MG/DL
LEUKOCYTE ESTERASE UR QL STRIP.AUTO: NEGATIVE
NITRITE UR QL STRIP.AUTO: NEGATIVE
PH UR STRIP.AUTO: 5.5 [PH] (ref 5–8)
PROT UR QL STRIP: NEGATIVE MG/DL
RBC UR QL AUTO: NORMAL
SP GR UR STRIP.AUTO: 1.03
UROBILINOGEN UR STRIP-MCNC: 0.2 MG/DL

## 2019-01-10 PROCEDURE — 90471 IMMUNIZATION ADMIN: CPT | Performed by: INTERNAL MEDICINE

## 2019-01-10 PROCEDURE — 87086 URINE CULTURE/COLONY COUNT: CPT

## 2019-01-10 PROCEDURE — 81002 URINALYSIS NONAUTO W/O SCOPE: CPT | Performed by: FAMILY MEDICINE

## 2019-01-10 PROCEDURE — 90686 IIV4 VACC NO PRSV 0.5 ML IM: CPT | Performed by: INTERNAL MEDICINE

## 2019-01-10 PROCEDURE — 99214 OFFICE O/P EST MOD 30 MIN: CPT | Performed by: FAMILY MEDICINE

## 2019-01-10 PROCEDURE — 99000 SPECIMEN HANDLING OFFICE-LAB: CPT | Performed by: FAMILY MEDICINE

## 2019-01-10 NOTE — NON-PROVIDER
"Ramona Wilson is a 58 y.o. female here for a non-provider visit for:   FLU    Reason for immunization: Annual Flu Vaccine  Immunization records indicate need for vaccine: Yes, confirmed with Epic  Minimum interval has been met for this vaccine: Yes  ABN completed: Not Indicated    Order and dose verified by: MESFIN  VIS Dated  8/7/15 was given to patient: Yes  All IAC Questionnaire questions were answered \"No.\"    Patient tolerated injection and no adverse effects were observed or reported: Yes    Pt scheduled for next dose in series: Not Indicated    "

## 2019-01-12 LAB
BACTERIA UR CULT: NORMAL
SIGNIFICANT IND 70042: NORMAL
SITE SITE: NORMAL
SOURCE SOURCE: NORMAL

## 2019-01-15 NOTE — ASSESSMENT & PLAN NOTE
Chronic, stable, well-controlled, currently asymptomatic from postmenopausal estrogen deficit standpoint.  Patient felt hot flashes, without migraines, however has not been tested for decreased bone density.  We discussed that this will be done at the age of 65.

## 2019-01-15 NOTE — ASSESSMENT & PLAN NOTE
New problem, uncontrolled, discovered on urinalysis, therefore we will send off for urine please see notes from same day service 1/10/2019 regarding history of cystitis

## 2019-01-15 NOTE — PROGRESS NOTES
Subjective:   Chief Complaint/History of Present Illness:  Ramona Wilson is a 58 y.o. female established patient who presents today for evaluation of acute urinary symptoms:    History of acute cystitis  Established problem, uncontrolled, patient believes that she has continued UTI.  Review of records reveals that patient's former urinalysis was not strongly positive for UTI, however provider at that time did prescribe her antibiotics.  This was roughly 1 month ago, and therefore patient would like to know if she is having recurrence of symptoms, given the patient continues to have some mild discomfort/dysuria.    Otherwise, ROS is negative for STI's.    ROS is NEGATIVE for fevers, chills, pelvic pain, genital rash/pruritus, vaginal discharge/bleeding, pyuria, hematuria, polyuria, increased frequency of urination.     Asymptomatic microscopic hematuria  New problem, uncontrolled, discovered on urinalysis, therefore we will send off for urine please see notes from same day service 1/10/2019 regarding history of cystitis    Postmenopausal  Chronic, stable, well-controlled, currently asymptomatic from postmenopausal estrogen deficit standpoint.  Patient felt hot flashes, without migraines, however has not been tested for decreased bone density.  We discussed that this will be done at the age of 65.      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     • buPROPion (WELLBUTRIN XL) 300 MG XL tablet Take 1 Tab by mouth every morning. 90 Tab 3   • Cholecalciferol (VITAMIN D) 1000 UNIT CAPS Take  by mouth every day.       No current facility-administered medications for this visit.         Social History:     Social History   Substance Use Topics   • Smoking status: Never Smoker   • Smokeless tobacco: Never Used   • Alcohol use No      Comment: occ       ROS:     - NOTE: All other systems reviewed and are negative, except as in HPI.     Objective:   Physical Exam:    Vitals: Blood pressure 110/70, pulse 74, temperature 36.4 °C (97.6 °F), height 1.575 m (5' 2.01\"), weight 88.5 kg (195 lb 3.2 oz), SpO2 96 %, not currently breastfeeding.   BMI: Body mass index is 35.69 kg/m².   General/Constitutional: Vitals as above, Well nourished, well developed female in no acute distress   Head/Eyes: Head is grossly normal & atraumatic, bilateral EOMI, bilateral PERRL   ENT: Bilateral external ears grossly normal in appearance, Hearing grossly intact   Respiratory: No respiratory distress, bilateral lungs are clear to ausculation in all lung fields (anterior/lateral/posterior), no wheezing/rhonchi/rales   Cardiovascular: Regular rate and rhythm without murmur/gallops/rubs, distal pulses are intact and equal bilaterally (radial, posterior tibial), no bilateral lower extremity edema   Gastrointestinal:   Bowel sounds present in all 4 quadrants, abdomen mildly tender to deep palpation of suprapubic area, no tenderness to kidney ballotment bilaterally   MSK: Gait grossly normal & not antalgic, No CVAT bilaterally   Integumentary: No apparent rashes   Psych: Judgment grossly " appropriate, no apparent depression/anxiety    Health Maintenance:     - Pap smear may not be up-to-date, patient asked to follow-up on this with her PCP.    Imaging/Labs:     - Point-of-care urinalysis positive for 1+ blood    Assessment and Plan:   1. History of acute cystitis  Established problem, uncontrolled, patient to have urine sent off for urine culture.  We discussed that since patient is relatively asymptomatic present time, we will hold off on antibiotics for now.  Patient verbalized understanding.   - POCT Urinalysis   - URINE CULTURE(NEW); Future     2. Asymptomatic microscopic hematuria  New problem based on point of care urinalysis, as above.  See assessment plan as in #1, above.   - URINE CULTURE(NEW); Future    3. Postmenopausal  Chronic, stable, well-controlled.          RTC: As needed for worsening of symptoms.    PLEASE NOTE: This dictation was created using voice recognition software. I have made every reasonable attempt to correct obvious errors, but I expect that there are errors of grammar and possibly content that I did not discover before finalizing the note.

## 2019-01-15 NOTE — ASSESSMENT & PLAN NOTE
Established problem, uncontrolled, patient believes that she has continued UTI.  Review of records reveals that patient's former urinalysis was not strongly positive for UTI, however provider at that time did prescribe her antibiotics.  This was roughly 1 month ago, and therefore patient would like to know if she is having recurrence of symptoms, given the patient continues to have some mild discomfort/dysuria.    Otherwise, ROS is negative for STI's.    ROS is NEGATIVE for fevers, chills, pelvic pain, genital rash/pruritus, vaginal discharge/bleeding, pyuria, hematuria, polyuria, increased frequency of urination.

## 2019-01-25 ENCOUNTER — OFFICE VISIT (OUTPATIENT)
Dept: URGENT CARE | Facility: CLINIC | Age: 59
End: 2019-01-25
Payer: COMMERCIAL

## 2019-01-25 VITALS
BODY MASS INDEX: 35.33 KG/M2 | HEIGHT: 62 IN | SYSTOLIC BLOOD PRESSURE: 142 MMHG | DIASTOLIC BLOOD PRESSURE: 94 MMHG | OXYGEN SATURATION: 96 % | WEIGHT: 192 LBS | TEMPERATURE: 98.3 F | HEART RATE: 88 BPM | RESPIRATION RATE: 16 BRPM

## 2019-01-25 DIAGNOSIS — J06.9 UPPER RESPIRATORY TRACT INFECTION, UNSPECIFIED TYPE: ICD-10-CM

## 2019-01-25 PROCEDURE — 94640 AIRWAY INHALATION TREATMENT: CPT | Performed by: PHYSICIAN ASSISTANT

## 2019-01-25 PROCEDURE — 99214 OFFICE O/P EST MOD 30 MIN: CPT | Mod: 25 | Performed by: PHYSICIAN ASSISTANT

## 2019-01-25 RX ORDER — IPRATROPIUM BROMIDE AND ALBUTEROL SULFATE 2.5; .5 MG/3ML; MG/3ML
3 SOLUTION RESPIRATORY (INHALATION) ONCE
Status: COMPLETED | OUTPATIENT
Start: 2019-01-25 | End: 2019-01-25

## 2019-01-25 RX ORDER — DEXTROMETHORPHAN HYDROBROMIDE AND PROMETHAZINE HYDROCHLORIDE 15; 6.25 MG/5ML; MG/5ML
5 SYRUP ORAL 4 TIMES DAILY PRN
Qty: 140 ML | Refills: 0 | Status: SHIPPED | OUTPATIENT
Start: 2019-01-25 | End: 2019-04-04

## 2019-01-25 RX ORDER — DOXYCYCLINE HYCLATE 100 MG
100 TABLET ORAL 2 TIMES DAILY
Qty: 14 TAB | Refills: 0 | Status: SHIPPED | OUTPATIENT
Start: 2019-01-25 | End: 2019-02-01

## 2019-01-25 RX ORDER — ALBUTEROL SULFATE 90 UG/1
2 AEROSOL, METERED RESPIRATORY (INHALATION) EVERY 6 HOURS PRN
Qty: 1 INHALER | Refills: 0 | Status: SHIPPED | OUTPATIENT
Start: 2019-01-25 | End: 2019-12-23

## 2019-01-25 RX ADMIN — IPRATROPIUM BROMIDE AND ALBUTEROL SULFATE 3 ML: 2.5; .5 SOLUTION RESPIRATORY (INHALATION) at 15:24

## 2019-01-25 ASSESSMENT — ENCOUNTER SYMPTOMS
HEADACHES: 0
SINUS PAIN: 1
MYALGIAS: 0
VOMITING: 0
COUGH: 1
EYE DISCHARGE: 0
FEVER: 0
SPUTUM PRODUCTION: 1
SHORTNESS OF BREATH: 0
SORE THROAT: 0
CONSTIPATION: 0
ABDOMINAL PAIN: 0
EYE PAIN: 0
CHILLS: 0
DIARRHEA: 0
NAUSEA: 0

## 2019-01-25 NOTE — PROGRESS NOTES
Subjective:   Ramona Wilson is a 58 y.o. female who presents for Cough and Sinus Problem       Cough   This is a new problem. Episode onset: 1 week ago. The problem has been gradually worsening. The problem occurs every few minutes. The cough is productive of sputum. Associated symptoms include nasal congestion. Pertinent negatives include no chest pain, chills, ear congestion, ear pain, fever, headaches, myalgias, sore throat or shortness of breath. Nothing aggravates the symptoms. Treatments tried: Mucinex. The treatment provided mild relief. There is no history of asthma.   Sinus Problem   Associated symptoms include congestion and coughing. Pertinent negatives include no chills, ear pain, headaches, shortness of breath or sore throat.     Review of Systems   Constitutional: Negative for chills and fever.   HENT: Positive for congestion and sinus pain. Negative for ear discharge, ear pain and sore throat.    Eyes: Negative for pain and discharge.   Respiratory: Positive for cough and sputum production. Negative for shortness of breath.    Cardiovascular: Negative for chest pain.   Gastrointestinal: Negative for abdominal pain, constipation, diarrhea, nausea and vomiting.   Musculoskeletal: Negative for myalgias.   Neurological: Negative for headaches.   All other systems reviewed and are negative.      PMH:  has a past medical history of Anxiety (4/21/2009); Chronic low back pain (4/21/2009); Dyslipidemia (4/21/2009); Eczema (4/21/2009); Hypovitaminosis D (4/23/2009); RLS (restless legs syndrome); Sleep apnea; and Surveillance for birth control, oral contraceptives (4/21/2009).    MEDS:   Current Outpatient Prescriptions:   •  albuterol 108 (90 Base) MCG/ACT Aero Soln inhalation aerosol, Inhale 2 Puffs by mouth every 6 hours as needed for Shortness of Breath., Disp: 1 Inhaler, Rfl: 0  •  promethazine-dextromethorphan (PROMETHAZINE-DM) 6.25-15 MG/5ML syrup, Take 5 mL by mouth 4 times a day as needed for  "Cough., Disp: 140 mL, Rfl: 0  •  doxycycline (VIBRAMYCIN) 100 MG Tab, Take 1 Tab by mouth 2 times a day for 7 days., Disp: 14 Tab, Rfl: 0  •  Multiple Vitamins-Minerals (MULTIVITAMIN ADULT PO), Take  by mouth., Disp: , Rfl:   •  buPROPion (WELLBUTRIN XL) 300 MG XL tablet, Take 1 Tab by mouth every morning., Disp: 90 Tab, Rfl: 3  •  Cholecalciferol (VITAMIN D) 1000 UNIT CAPS, Take  by mouth every day., Disp: , Rfl:     ALLERGIES:   Allergies   Allergen Reactions   • Amoxicillin Hives       SURGHX:   Past Surgical History:   Procedure Laterality Date   • MAMMOPLASTY REDUCTION  2010    Performed by LUANNE LARRY at [facility] ORS   • PRIMARY C SECTION         • MOSHE BY LAPAROSCOPY         SOCHX:  reports that she has never smoked. She has never used smokeless tobacco. She reports that she does not drink alcohol or use drugs.    FH: Reviewed with patient, not pertinent to this visit.     Objective:   /94 (BP Location: Left arm, Patient Position: Sitting)   Pulse 88   Temp 36.8 °C (98.3 °F)   Resp 16   Ht 1.575 m (5' 2\")   Wt 87.1 kg (192 lb)   SpO2 96%   BMI 35.12 kg/m²   Physical Exam   Constitutional: She is oriented to person, place, and time. She appears well-developed and well-nourished. No distress.   HENT:   Head: Normocephalic and atraumatic.   Right Ear: Tympanic membrane, external ear and ear canal normal.   Left Ear: Tympanic membrane, external ear and ear canal normal.   Nose: Mucosal edema present. No sinus tenderness. Right sinus exhibits no maxillary sinus tenderness and no frontal sinus tenderness. Left sinus exhibits no maxillary sinus tenderness and no frontal sinus tenderness.   Mouth/Throat: Uvula is midline and mucous membranes are normal. Posterior oropharyngeal erythema present. No oropharyngeal exudate or posterior oropharyngeal edema.   Eyes: Conjunctivae and EOM are normal.   Neck: Normal range of motion. Neck supple. No tracheal deviation " present.   Cardiovascular: Normal rate, regular rhythm and normal heart sounds.    Pulmonary/Chest: Effort normal. No respiratory distress. She has no wheezes. She has no rhonchi. She has no rales.   Decreased breath sounds bilaterally. Improved after DuoNeb treatment.   Musculoskeletal:   ROM normal all four extremities   Lymphadenopathy:     She has no cervical adenopathy.   Neurological: She is alert and oriented to person, place, and time.   Skin: Skin is warm and dry.   Psychiatric: She has a normal mood and affect. Her behavior is normal. Judgment and thought content normal.       Assessment/Plan:   1. Upper respiratory tract infection, unspecified type  - ipratropium-albuterol (DUONEB) nebulizer solution; 3 mL by Nebulization route Once.  - albuterol 108 (90 Base) MCG/ACT Aero Soln inhalation aerosol; Inhale 2 Puffs by mouth every 6 hours as needed for Shortness of Breath.  Dispense: 1 Inhaler; Refill: 0  - promethazine-dextromethorphan (PROMETHAZINE-DM) 6.25-15 MG/5ML syrup; Take 5 mL by mouth 4 times a day as needed for Cough.  Dispense: 140 mL; Refill: 0  - doxycycline (VIBRAMYCIN) 100 MG Tab; Take 1 Tab by mouth 2 times a day for 7 days.  Dispense: 14 Tab; Refill: 0    - Advised to continue OTC mucinex, try fluticasone nasal spray, ibuprofen/acetaminophen prn for symptom relief  - Contingent antibiotic prescription given to patient to fill upon meeting criteria of guidelines discussed.   - Advised to take abx with food/yogurt and to complete course  - Advised to return if symptoms worsen or do not improve    Differential diagnosis, natural history, supportive care, and indications for immediate follow-up discussed.

## 2019-03-06 ENCOUNTER — APPOINTMENT (OUTPATIENT)
Dept: MEDICAL GROUP | Age: 59
End: 2019-03-06
Payer: COMMERCIAL

## 2019-03-19 ENCOUNTER — TELEPHONE (OUTPATIENT)
Dept: MEDICAL GROUP | Facility: MEDICAL CENTER | Age: 59
End: 2019-03-19

## 2019-03-19 DIAGNOSIS — M25.569 KNEE PAIN, UNSPECIFIED CHRONICITY, UNSPECIFIED LATERALITY: ICD-10-CM

## 2019-03-19 NOTE — TELEPHONE ENCOUNTER
Ramona Garrido [facility]  [phone] (home)     Pt called and LM. Needs REF for ORTHO.\ Dr Jules for knee pain.

## 2019-03-29 ENCOUNTER — TELEPHONE (OUTPATIENT)
Dept: MEDICAL GROUP | Facility: MEDICAL CENTER | Age: 59
End: 2019-03-29

## 2019-03-29 DIAGNOSIS — M79.646 THUMB PAIN, UNSPECIFIED LATERALITY: ICD-10-CM

## 2019-03-29 DIAGNOSIS — M25.569 KNEE PAIN, UNSPECIFIED CHRONICITY, UNSPECIFIED LATERALITY: ICD-10-CM

## 2019-03-29 NOTE — TELEPHONE ENCOUNTER
I will placed a referral to orthopedics, but I have not seen her in over a year.  She should schedule an annual examination appointment.

## 2019-03-29 NOTE — TELEPHONE ENCOUNTER
Ramona Radhika [facility]  [phone] (home)     Pt has REF to [facility] for her knee.     Her L thumb is still hurting and she would like to have them look at it too. She discudded it with you at her last appt. Needs REf for her L thumb also.

## 2019-03-30 PROBLEM — R31.21 ASYMPTOMATIC MICROSCOPIC HEMATURIA: Status: RESOLVED | Noted: 2019-01-10 | Resolved: 2019-03-30

## 2019-03-30 PROBLEM — Z87.440 HISTORY OF ACUTE CYSTITIS: Status: RESOLVED | Noted: 2018-12-18 | Resolved: 2019-03-30

## 2019-03-30 PROBLEM — J02.9 SORE THROAT: Status: RESOLVED | Noted: 2018-12-18 | Resolved: 2019-03-30

## 2019-03-30 PROBLEM — Z78.0 POSTMENOPAUSAL: Status: RESOLVED | Noted: 2019-01-10 | Resolved: 2019-03-30

## 2019-04-04 ENCOUNTER — OFFICE VISIT (OUTPATIENT)
Dept: MEDICAL GROUP | Facility: MEDICAL CENTER | Age: 59
End: 2019-04-04
Payer: COMMERCIAL

## 2019-04-04 VITALS
WEIGHT: 195 LBS | TEMPERATURE: 97.9 F | BODY MASS INDEX: 35.88 KG/M2 | DIASTOLIC BLOOD PRESSURE: 86 MMHG | HEART RATE: 90 BPM | HEIGHT: 62 IN | OXYGEN SATURATION: 97 % | SYSTOLIC BLOOD PRESSURE: 138 MMHG

## 2019-04-04 DIAGNOSIS — R21 RASH: ICD-10-CM

## 2019-04-04 DIAGNOSIS — Z23 NEED FOR ZOSTER VACCINE: ICD-10-CM

## 2019-04-04 DIAGNOSIS — E66.9 CLASS 1 OBESITY WITHOUT SERIOUS COMORBIDITY WITH BODY MASS INDEX (BMI) OF 34.0 TO 34.9 IN ADULT, UNSPECIFIED OBESITY TYPE: ICD-10-CM

## 2019-04-04 DIAGNOSIS — Z12.31 ENCOUNTER FOR SCREENING MAMMOGRAM FOR BREAST CANCER: ICD-10-CM

## 2019-04-04 DIAGNOSIS — F32.A DEPRESSION, UNSPECIFIED DEPRESSION TYPE: Chronic | ICD-10-CM

## 2019-04-04 DIAGNOSIS — Z00.00 PREVENTATIVE HEALTH CARE: Primary | Chronic | ICD-10-CM

## 2019-04-04 PROCEDURE — 99396 PREV VISIT EST AGE 40-64: CPT | Performed by: INTERNAL MEDICINE

## 2019-04-04 RX ORDER — CHLORAL HYDRATE 500 MG
1000 CAPSULE ORAL
COMMUNITY

## 2019-04-04 RX ORDER — CLOBETASOL PROPIONATE 0.46 MG/ML
1 SOLUTION TOPICAL 2 TIMES DAILY
Qty: 50 ML | Refills: 5 | Status: SHIPPED | OUTPATIENT
Start: 2019-04-04 | End: 2019-12-23

## 2019-04-04 ASSESSMENT — ENCOUNTER SYMPTOMS
DOUBLE VISION: 0
HEARTBURN: 0
PALPITATIONS: 0
SHORTNESS OF BREATH: 0
CONSTIPATION: 0
DIZZINESS: 0
INSOMNIA: 0
WEIGHT LOSS: 0
DEPRESSION: 0
BLURRED VISION: 0

## 2019-04-04 NOTE — PROGRESS NOTES
Subjective:      Ramona Wilson is a 58 y.o. female who presents with Annual Exam            HPI   Annual exam  meds and allergies reviewed, medical history, surgical history, social history, family history reviewed and updated  FH some stress autistic son at assisted living   Sees  psychiatrist on wellbutrin, is currently off zoloft, and Adderall, has autistic son staying with her on weekends,  works out of city and travels, no suicidal ideation, stable on Wellbutrin  Has had more knee pain left knee, no falls, no trauma to the knee has an appointment with orthopedics pending, not exercising on a regular basis.        Current Outpatient Prescriptions   Medication Sig Dispense Refill   • Omega-3 Fatty Acids (FISH OIL) 1000 MG Cap capsule Take 1,000 mg by mouth 3 times a day, with meals.     • Multiple Vitamins-Minerals (MULTIVITAMIN ADULT PO) Take  by mouth.     • buPROPion (WELLBUTRIN XL) 300 MG XL tablet Take 1 Tab by mouth every morning. 90 Tab 3   • Cholecalciferol (VITAMIN D) 1000 UNIT CAPS Take  by mouth every day.     • albuterol 108 (90 Base) MCG/ACT Aero Soln inhalation aerosol Inhale 2 Puffs by mouth every 6 hours as needed for Shortness of Breath. 1 Inhaler 0   • promethazine-dextromethorphan (PROMETHAZINE-DM) 6.25-15 MG/5ML syrup Take 5 mL by mouth 4 times a day as needed for Cough. (Patient not taking: Reported on 4/4/2019) 140 mL 0     No current facility-administered medications for this visit.                       Sacral pain  9/30/15 xray sacrum and coccyx slight posterior subluxation of the second coccygeal segment which may represent sequela of old injury,no acute sacral or coccygeal fracture,mild degenerative changes of each SI joint  9/30/15 referral to physical therapy     Status post cholecystectomy     Preventative health  5/21/12 colon per [facility] polyp pathology inflammatory, repeat in 10 years  9/30/15 tdap  12/15/15 pap negative per gyn  2/16/18 vit d 39 on 2000 units  daily  3/7/18 dexa LS -1.2,hip 0.0  3/8/18 mammogram  10/26/18 [facility]     Left heel spur    Impaired glucose metabolism  12/6/16 A1c 5.8%,bs 86  2/16/18 A1c 5.6%    history sleep apnea  10/15/10 positive apnea link formal eval pending   9/3/15 [facility] sleep note long history of snoring, daytime somnolence, will be set up for polysomnogram evaluate for sleep apnea.  11/30/15 sleep study, CPAP was adjusted between 4 and 8, recommend bilevel  11/30/15 sleep study 2-27 minutes total, AHI 0.4, hypopnea index 26, supine AHI 27, mean saturation 93%, minimum saturation 86%, saturations below 89% for 0.9% of sleep time, cpap 4-8 AHI 21.7, mean saturation 93% with minimum saturation 87%  12/18/15 [facility] sleep note repeat cpap titration  2/17/16 [facility] sleep note, start autocpap 9-18, cnox after acclimated  2/15/18 off cpap     Elbow fracture  1/21/15 x-ray left elbow radial head fracture  3/19/15  orthopedic note, x-ray elbow no displacement, healed left radial head fracture     [place] syndrome  5/13/14 informed by dentist 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 Would need referral ENT     Dyslipidemia  4/09 chol 150,trig, 258, hdl 30, ldl 68  10/10 chol 149,trig 256,hdl 38,ldl 60 start fish oil 2 bid  1/9/14 chol 179,trig 186,hdl 42,ldl 100  9/11/15 chol 175,trig 185,hdl 54,ldl 84  2/16/18 chol 150,trig 158,hdl 49,ldl 75      Depression  On effexor since 2006, failed celexa  10/10 increase effexor xr to 150 mg and add ativan 0.5 mg prn   10/7/13 continue effexor  mg, add wellbutrin 150 mg daily, PHQ score 17 declined psychotherapy  2/27/17 on effexor 150  mg and wellbutrin 150 mg bid  3/13/17 insurance not cover wellbutrin  mg bid, change to 300 mg qday  11/20/17 referral  at [facility]  2/15/18 PHQ 8 sees  psychiatry on wellbutrin 300 mg, zoloft and adderall 5 mg bid      Cyst  ovary  11/23/15 ultrasound gynecologic, prominent endometrial stripe, 3.3 cm right ovarian cyst     Chronic low back pain  5/07 MRI lumbar spine L5-S1 small disc protrusion, L4-L5 broad-based disc   8/25/16  pain note left sacroiliac joint dysfunction, provided left sacroiliac steroidal joint injection under fluoroscopy  9/27/16  pain management procedure note, left L5 dorsal ramus and left S1-S3 nerve radiofrequency ablation under fluoroscopy  10/18/16  pain note s/p left L5S3 FJNA on 9/27/16 80% improvement in symptoms, recommend pennsaid 2% topical, stop naproxen, trial physical therapy, consider ganglion nerve block     Abnormal liver enzymes  1/9/14 AST 76,,alk 70,bili 0.6  5/9/14 AST 31,ALT 49,GGT 11,iron panel normal, hepatitis panel negative,SHIMON negative  9/11/15 AST 20,ALT 36,alk phos 68,bili 0.4,GGT 13, iron 102,%sat 25,AMA neg,actin IgG negative,SPEP neg  12/6/16 AST 38,ALT 59 done at [facility]  1/16/18 AST 30,ALT 43 done at [facility]                 Patient Active Problem List   Diagnosis   • Chronic low back pain   • Dyslipidemia   • Depression   • Preventative health care   • History of sleep apnea   • S/P cholecystectomy   • Pain of left heel   • Eagle's syndrome   • Elbow fracture, left   • Abnormal liver function   • Obesity   • Sacrococcygeal pain   • Cyst of ovary, right   • Impaired fasting glucose          Health Maintenance Summary                IMM ZOSTER VACCINES Overdue 12/25/2010     COLONOSCOPY Overdue 5/21/2015      Done 5/21/2012 AMB REFERRAL TO GI FOR COLONOSCOPY    PAP SMEAR Overdue 12/15/2018      Done 12/15/2015 PAP SENT TO [facility].     Patient has more history with this topic...    MAMMOGRAM Overdue 3/7/2019      Done 3/7/2018 MA-SCREEN MAMMO W/CAD-BILAT     Patient has more history with this topic...    IMM DTaP/Tdap/Td Vaccine Next Due 9/30/2025      Done 9/30/2015 Imm Admin: Tdap Vaccine          Patient Care Team:  Jordan Rand M.D. as PCP -  "General      Review of Systems   Constitutional: Negative for weight loss.   HENT: Negative for hearing loss.    Eyes: Negative for blurred vision and double vision.   Respiratory: Negative for shortness of breath.    Cardiovascular: Negative for chest pain and palpitations.   Gastrointestinal: Negative for constipation and heartburn.   Genitourinary: Negative for frequency.   Neurological: Negative for dizziness.   Endo/Heme/Allergies: Negative for environmental allergies.   Psychiatric/Behavioral: Negative for depression. The patient does not have insomnia.           Objective:     /86 (BP Location: Right arm, Patient Position: Sitting, BP Cuff Size: Adult)   Pulse 90   Temp 36.6 °C (97.9 °F)   Ht 1.575 m (5' 2\")   Wt 88.5 kg (195 lb)   SpO2 97%   BMI 35.67 kg/m²      Physical Exam   Constitutional: She appears well-developed and well-nourished. No distress.   HENT:   Head: Normocephalic and atraumatic.   Right Ear: External ear normal.   Left Ear: External ear normal.   Nose: Nose normal.   Mouth/Throat: Oropharynx is clear and moist.   Eyes: Conjunctivae are normal. Right eye exhibits no discharge. Left eye exhibits no discharge.   Neck: Neck supple. No JVD present. No thyromegaly present.   Cardiovascular: Normal rate, regular rhythm and normal heart sounds.    Pulmonary/Chest: Effort normal and breath sounds normal. No respiratory distress. She has no wheezes.   Abdominal: She exhibits no distension.   Musculoskeletal: She exhibits no edema.   Neurological: She is alert.   Skin: Skin is warm. She is not diaphoretic.   Psychiatric: She has a normal mood and affect. Her behavior is normal.   Nursing note and vitals reviewed.       Normal affect, insight, judgment    Right knee normal range of motion, negative Tracey's, no tenderness to palpation  Assessment/Plan:     Assessment  #!  Annual examination    #2 depression followed by psychiatry stable on Wellbutrin    #3 BMI 35.6    #4 right knee pain " question arthritis, chondromalacia    #5 History sleep apnea no recurrence    #6 impaired glucose metabolism    #7 borderline blood pressure today, no history of hypertension does not check blood pressure at home    #8 preventative health  5/21/12 colon per [facility] polyp pathology inflammatory, repeat in 10 years  9/30/15 tdap  12/15/15 pap negative per gyn  2/16/18 vit d 39 on 2000 units daily  3/7/18 dexa LS -1.2,hip 0.0  3/8/18 mammogram  10/[facility]/18 [facility]      Plan  #! Follow up psychiatry no change of medications, continue Wellbutrin    #2 continue no etoh    #3 recommend exercise stationary bike 30 minutes daily    #4 weight management referral for obesity, risk factors of history sleep apnea, impaired glucose metabolism, borderline blood pressure    #5 mammogram     #6 nutrition, diet, exercise discussed, understands obesity increases risk for diabetes and heart disease    #7 labs    #8 check blood pressure daily and record drop off readings in 1 month, low-sodium diet    #9 follow-up 3 months    #10  She will schedule Pap smear with 1 of the female providers in our office

## 2019-04-26 ENCOUNTER — TELEPHONE (OUTPATIENT)
Dept: MEDICAL GROUP | Facility: MEDICAL CENTER | Age: 59
End: 2019-04-26

## 2019-04-26 LAB
25(OH)D3+25(OH)D2 SERPL-MCNC: 47 NG/ML (ref 30–100)
ALBUMIN SERPL-MCNC: 4.3 G/DL (ref 3.5–5.5)
ALBUMIN/GLOB SERPL: 1.7 {RATIO} (ref 1.2–2.2)
ALP SERPL-CCNC: 60 IU/L (ref 39–117)
ALT SERPL-CCNC: 39 IU/L (ref 0–32)
APPEARANCE UR: CLEAR
AST SERPL-CCNC: 27 IU/L (ref 0–40)
BACTERIA #/AREA URNS HPF: NORMAL /[HPF]
BASOPHILS # BLD AUTO: 0 X10E3/UL (ref 0–0.2)
BASOPHILS NFR BLD AUTO: 0 %
BILIRUB SERPL-MCNC: 0.3 MG/DL (ref 0–1.2)
BILIRUB UR QL STRIP: NEGATIVE
BUN SERPL-MCNC: 17 MG/DL (ref 6–24)
BUN/CREAT SERPL: 20 (ref 9–23)
CALCIUM SERPL-MCNC: 9.6 MG/DL (ref 8.7–10.2)
CASTS URNS MICRO: NORMAL
CASTS URNS QL MICRO: NORMAL
CHLORIDE SERPL-SCNC: 104 MMOL/L (ref 96–106)
CHOLEST SERPL-MCNC: 166 MG/DL (ref 100–199)
CO2 SERPL-SCNC: 24 MMOL/L (ref 20–29)
COLOR UR: YELLOW
CREAT SERPL-MCNC: 0.83 MG/DL (ref 0.57–1)
CRYSTALS URNS MICRO: NORMAL
EOSINOPHIL # BLD AUTO: 0.2 X10E3/UL (ref 0–0.4)
EOSINOPHIL NFR BLD AUTO: 3 %
EPI CELLS #/AREA URNS HPF: NORMAL /HPF
ERYTHROCYTE [DISTWIDTH] IN BLOOD BY AUTOMATED COUNT: 13.7 % (ref 12.3–15.4)
GLOBULIN SER CALC-MCNC: 2.6 G/DL (ref 1.5–4.5)
GLUCOSE SERPL-MCNC: 92 MG/DL (ref 65–99)
GLUCOSE UR QL: NEGATIVE
HBA1C MFR BLD: 5.6 % (ref 4.8–5.6)
HCT VFR BLD AUTO: 44.3 % (ref 34–46.6)
HDLC SERPL-MCNC: 44 MG/DL
HGB BLD-MCNC: 15.2 G/DL (ref 11.1–15.9)
HGB UR QL STRIP: NEGATIVE
IMM GRANULOCYTES # BLD AUTO: 0 X10E3/UL (ref 0–0.1)
IMM GRANULOCYTES NFR BLD AUTO: 0 %
IMMATURE CELLS  115398: NORMAL
KETONES UR QL STRIP: NEGATIVE
LABORATORY COMMENT REPORT: ABNORMAL
LDLC SERPL CALC-MCNC: 66 MG/DL (ref 0–99)
LEUKOCYTE ESTERASE UR QL STRIP: NEGATIVE
LYMPHOCYTES # BLD AUTO: 2.4 X10E3/UL (ref 0.7–3.1)
LYMPHOCYTES NFR BLD AUTO: 43 %
MCH RBC QN AUTO: 30.3 PG (ref 26.6–33)
MCHC RBC AUTO-ENTMCNC: 34.3 G/DL (ref 31.5–35.7)
MCV RBC AUTO: 88 FL (ref 79–97)
MICRO URNS: NORMAL
MICRO URNS: NORMAL
MONOCYTES # BLD AUTO: 0.5 X10E3/UL (ref 0.1–0.9)
MONOCYTES NFR BLD AUTO: 9 %
MORPHOLOGY BLD-IMP: NORMAL
MUCOUS THREADS URNS QL MICRO: PRESENT
NEUTROPHILS # BLD AUTO: 2.4 X10E3/UL (ref 1.4–7)
NEUTROPHILS NFR BLD AUTO: 45 %
NITRITE UR QL STRIP: NEGATIVE
NRBC BLD AUTO-RTO: NORMAL %
PH UR STRIP: 5.5 [PH] (ref 5–7.5)
PLATELET # BLD AUTO: 187 X10E3/UL (ref 150–379)
POTASSIUM SERPL-SCNC: 3.8 MMOL/L (ref 3.5–5.2)
PROT SERPL-MCNC: 6.9 G/DL (ref 6–8.5)
PROT UR QL STRIP: NEGATIVE
RBC # BLD AUTO: 5.01 X10E6/UL (ref 3.77–5.28)
RBC #/AREA URNS HPF: NORMAL /HPF
RENAL EPI CELLS #/AREA URNS HPF: NORMAL /[HPF]
SODIUM SERPL-SCNC: 142 MMOL/L (ref 134–144)
SP GR UR: 1.02 (ref 1–1.03)
T VAGINALIS URNS QL MICRO: NORMAL
TRIGL SERPL-MCNC: 279 MG/DL (ref 0–149)
TSH SERPL DL<=0.005 MIU/L-ACNC: 2.11 UIU/ML (ref 0.45–4.5)
UNIDENT CRYS URNS QL MICRO: NORMAL
URINALYSIS REFLEX  377202: NORMAL
URNS CMNT MICRO: NORMAL
UROBILINOGEN UR STRIP-MCNC: 0.2 MG/DL (ref 0.2–1)
VLDLC SERPL CALC-MCNC: 56 MG/DL (ref 5–40)
WBC # BLD AUTO: 5.5 X10E3/UL (ref 3.4–10.8)
WBC #/AREA URNS HPF: NORMAL /HPF
YEAST #/AREA URNS HPF: NORMAL /[HPF]

## 2019-04-27 NOTE — TELEPHONE ENCOUNTER
Notified with results, minimal elevation in ALT, triglycerides elevated no need for medication continue to work on nutrition and activity, continue vitamin D supplementation

## 2019-05-07 ENCOUNTER — APPOINTMENT (OUTPATIENT)
Dept: RADIOLOGY | Facility: MEDICAL CENTER | Age: 59
End: 2019-05-07
Attending: INTERNAL MEDICINE
Payer: COMMERCIAL

## 2019-05-21 ENCOUNTER — TELEPHONE (OUTPATIENT)
Dept: MEDICAL GROUP | Facility: MEDICAL CENTER | Age: 59
End: 2019-05-21

## 2019-05-21 NOTE — TELEPHONE ENCOUNTER
Noted, if develops severe abdominal pain, diarrhea, fever, blood in stool, nausea or vomiting, have her go to the ER for evaluation.

## 2019-05-21 NOTE — TELEPHONE ENCOUNTER
Ramona Wilson  [phone] (home)     Pt called to c/o stomach ache. States that it has been going on for 2 wks (Bad) actually 8 wks since it started. Pt sched for Thus.

## 2019-05-23 ENCOUNTER — OFFICE VISIT (OUTPATIENT)
Dept: MEDICAL GROUP | Facility: MEDICAL CENTER | Age: 59
End: 2019-05-23
Payer: COMMERCIAL

## 2019-05-23 VITALS
SYSTOLIC BLOOD PRESSURE: 128 MMHG | TEMPERATURE: 97.7 F | OXYGEN SATURATION: 92 % | WEIGHT: 194.8 LBS | DIASTOLIC BLOOD PRESSURE: 99 MMHG | BODY MASS INDEX: 35.85 KG/M2 | HEART RATE: 86 BPM | HEIGHT: 62 IN

## 2019-05-23 DIAGNOSIS — F32.A DEPRESSION, UNSPECIFIED DEPRESSION TYPE: Chronic | ICD-10-CM

## 2019-05-23 DIAGNOSIS — R19.7 DIARRHEA, UNSPECIFIED TYPE: ICD-10-CM

## 2019-05-23 PROCEDURE — 99214 OFFICE O/P EST MOD 30 MIN: CPT | Performed by: INTERNAL MEDICINE

## 2019-05-23 RX ORDER — FLUVOXAMINE MALEATE 50 MG/1
50 TABLET, COATED ORAL NIGHTLY
COMMUNITY
End: 2021-08-02

## 2019-05-23 NOTE — PATIENT INSTRUCTIONS
5/23/19 dyspepsia in the morning, question related to Luvox started 4 weeks ago taking at night, try taking that in the morning per psychiatry, continue check blood pressures, stool studies ordered, ultrasound right upper quadrant ordered consider IBS

## 2019-05-23 NOTE — PROGRESS NOTES
Subjective:      Ramona Wilson is a 58 y.o. female who presents with Follow-Up (stomach)            HPI   New complaint  For the last month patient has changed diet and eating healthier, trying to incorporate more chicken and vegetables, smaller meals twice a day, glucerna bar in am, and limiting sweets, sugar and processed foods. The last two weeks patient did have stomach symptoms including nausea one day last week, no emesis associated with that, some aching of stomach epigastric region, no radiation, not bother her when eating meal, no dysphagia, no odynophagia, mostly occurs on empty stomach so will notice when gets up in am, no gerd, has also had watery stools last week, not as significant this week, more normal stools this week formed, no blood in stools.  No history of travel or sick exposures.  No history of C. difficile or malabsorption.  No new vitamins or supplements. Taking advil for knee pain, taking two tabs once per day at night for that last month, but has cut back to once or twice per week.  No history of peptic ulcer disease.  Started on Luvox per  recently who is her psychiatrist this is within the past month.  Still remains on Wellbutrin, mood has been stable.  Some anxiety taking care of her autistic son. Blood pressures at home running 125-140/80-90 checking once per day and going to PT twice per week, and rides exercise bike at PT doing 3 miles at that time  Blood pressure readings at home running 120s to 130s over 80s to 90        Current Outpatient Prescriptions   Medication Sig Dispense Refill   • Omega-3 Fatty Acids (FISH OIL) 1000 MG Cap capsule Take 1,000 mg by mouth 3 times a day, with meals.     • clobetasol (TEMOVATE) 0.05 % external solution Apply 1 Application to affected area(s) 2 times a day. 50 mL 5   • albuterol 108 (90 Base) MCG/ACT Aero Soln inhalation aerosol Inhale 2 Puffs by mouth every 6 hours as needed for Shortness of Breath. 1 Inhaler 0   • Multiple  Vitamins-Minerals (MULTIVITAMIN ADULT PO) Take  by mouth.     • buPROPion (WELLBUTRIN XL) 300 MG XL tablet Take 1 Tab by mouth every morning. 90 Tab 3   • Cholecalciferol (VITAMIN D) 1000 UNIT CAPS Take  by mouth every day.       No current facility-administered medications for this visit.      Patient Active Problem List   Diagnosis   • Chronic low back pain   • Dyslipidemia   • Depression   • Preventative health care   • History of sleep apnea   • S/P cholecystectomy   • Pain of left heel   • Eagle's syndrome   • Elbow fracture, left   • Abnormal liver function   • Obesity   • Sacrococcygeal pain   • Cyst of ovary, right   • Impaired fasting glucose          Health Maintenance Summary                IMM ZOSTER VACCINES Overdue 12/25/2010     COLONOSCOPY Overdue 5/21/2015      Done 5/21/2012 AMB REFERRAL TO GI FOR COLONOSCOPY    PAP SMEAR Overdue 12/15/2018      Done 12/15/2015 PAP SENT TO [facility].     Patient has more history with this topic...    MAMMOGRAM Overdue 3/7/2019      Done 3/7/2018 MA-SCREEN MAMMO W/CAD-BILAT     Patient has more history with this topic...    IMM DTaP/Tdap/Td Vaccine Next Due 9/30/2025      Done 9/30/2015 Imm Admin: Tdap Vaccine          Patient Care Team:  Jordan Rand M.D. as PCP - General    ROS       Objective:          Physical Exam   Constitutional: She appears well-developed and well-nourished. No distress.   HENT:   Head: Normocephalic and atraumatic.   Eyes: Conjunctivae are normal. Right eye exhibits no discharge. Left eye exhibits no discharge.   Neck: No JVD present. No thyromegaly present.   Cardiovascular: Normal rate, regular rhythm and normal heart sounds.    Pulmonary/Chest: Effort normal and breath sounds normal.   Abdominal: Bowel sounds are normal. She exhibits no distension and no mass. There is no tenderness. There is no rebound and no guarding.   Musculoskeletal: She exhibits no edema.   Neurological: She is alert.   Skin: Skin is warm. She is not diaphoretic.    Psychiatric: She has a normal mood and affect. Her behavior is normal.   Nursing note and vitals reviewed.              Assessment/Plan:     Assessment  #! Dyspepsia over the past month, question related to Luvox which she takes at night, IBS, did have some stool changes, symptoms appear to be worse in the morning, has had cholecystectomy previously, no evidence of obstruction, less likely peptic ulcer disease, has had labs in the last 3 weeks essentially normal CBC and CMP    #2 depression stable on Luvox and Wellbutrin followed by psychiatry    #3 borderline hypertension blood pressures at home 120s to 130s over 80s to 90s    Plan  #! Limit nsaids     #2 change luvox to am dose     #3 us abdomen right upper quadrant    #4 stool tests ordered    #5 monitor if symptoms improve changing Luvox to a.m. Dose    #6 consider therapy for IBS but will get labs first and ultrasound    #7 try to start exercising more regularly, may need to consider blood pressure medication    #8 follow-up 3 months continue check blood pressures and record

## 2019-05-28 ENCOUNTER — HOSPITAL ENCOUNTER (OUTPATIENT)
Dept: RADIOLOGY | Facility: MEDICAL CENTER | Age: 59
End: 2019-05-28
Attending: INTERNAL MEDICINE
Payer: COMMERCIAL

## 2019-05-28 DIAGNOSIS — Z12.31 ENCOUNTER FOR SCREENING MAMMOGRAM FOR BREAST CANCER: ICD-10-CM

## 2019-05-28 PROCEDURE — 77067 SCR MAMMO BI INCL CAD: CPT

## 2019-05-31 ENCOUNTER — HOSPITAL ENCOUNTER (OUTPATIENT)
Dept: RADIOLOGY | Facility: MEDICAL CENTER | Age: 59
End: 2019-05-31
Attending: INTERNAL MEDICINE
Payer: COMMERCIAL

## 2019-05-31 ENCOUNTER — TELEPHONE (OUTPATIENT)
Dept: MEDICAL GROUP | Facility: MEDICAL CENTER | Age: 59
End: 2019-05-31

## 2019-05-31 DIAGNOSIS — R19.7 DIARRHEA, UNSPECIFIED TYPE: ICD-10-CM

## 2019-05-31 PROCEDURE — 76705 ECHO EXAM OF ABDOMEN: CPT

## 2019-07-29 ENCOUNTER — TELEPHONE (OUTPATIENT)
Dept: MEDICAL GROUP | Facility: MEDICAL CENTER | Age: 59
End: 2019-07-29

## 2019-07-29 DIAGNOSIS — M79.673 PAIN OF FOOT, UNSPECIFIED LATERALITY: ICD-10-CM

## 2019-07-30 NOTE — TELEPHONE ENCOUNTER
[facility]  [phone] (home)     Pt has been experiencing pain in her foot, coming from the heal. Wants a REF to [facility]. Please advise.

## 2019-08-05 ENCOUNTER — TELEPHONE (OUTPATIENT)
Dept: MEDICAL GROUP | Facility: MEDICAL CENTER | Age: 59
End: 2019-08-05

## 2019-08-05 DIAGNOSIS — M25.519 SHOULDER PAIN, UNSPECIFIED CHRONICITY, UNSPECIFIED LATERALITY: ICD-10-CM

## 2019-08-05 NOTE — TELEPHONE ENCOUNTER
Patient has upcoming appt with [facility] for heel pain, she would also like to get her right shoulder checked out as well. She left message requesting a referral to [facility] for right shoulder pain that has been going on for over a month. Please advise, thank you.

## 2019-08-06 ENCOUNTER — OFFICE VISIT (OUTPATIENT)
Dept: MEDICAL GROUP | Facility: MEDICAL CENTER | Age: 59
End: 2019-08-06
Payer: COMMERCIAL

## 2019-08-06 VITALS
RESPIRATION RATE: 16 BRPM | HEART RATE: 78 BPM | WEIGHT: 195 LBS | HEIGHT: 62 IN | OXYGEN SATURATION: 95 % | DIASTOLIC BLOOD PRESSURE: 84 MMHG | SYSTOLIC BLOOD PRESSURE: 130 MMHG | BODY MASS INDEX: 35.88 KG/M2 | TEMPERATURE: 97.8 F

## 2019-08-06 DIAGNOSIS — Z86.010 HISTORY OF COLON POLYPS: ICD-10-CM

## 2019-08-06 DIAGNOSIS — Z12.11 SCREENING FOR COLORECTAL CANCER: ICD-10-CM

## 2019-08-06 DIAGNOSIS — J02.9 ACUTE PHARYNGITIS, UNSPECIFIED ETIOLOGY: ICD-10-CM

## 2019-08-06 DIAGNOSIS — Z12.12 SCREENING FOR COLORECTAL CANCER: ICD-10-CM

## 2019-08-06 PROBLEM — K51.419 PSEUDOPOLYPOSIS OF COLON WITH COMPLICATION (HCC): Status: RESOLVED | Noted: 2019-08-06 | Resolved: 2019-08-06

## 2019-08-06 PROBLEM — K51.419 PSEUDOPOLYPOSIS OF COLON WITH COMPLICATION (HCC): Status: ACTIVE | Noted: 2019-08-06

## 2019-08-06 PROBLEM — Z86.0100 HISTORY OF COLON POLYPS: Status: ACTIVE | Noted: 2019-08-06

## 2019-08-06 LAB
INT CON NEG: NORMAL
INT CON POS: NORMAL
S PYO AG THROAT QL: NORMAL

## 2019-08-06 PROCEDURE — 99214 OFFICE O/P EST MOD 30 MIN: CPT | Performed by: PHYSICIAN ASSISTANT

## 2019-08-06 PROCEDURE — 87880 STREP A ASSAY W/OPTIC: CPT | Performed by: PHYSICIAN ASSISTANT

## 2019-08-06 RX ORDER — LORATADINE 10 MG/1
10 TABLET ORAL DAILY
COMMUNITY
End: 2019-12-23

## 2019-08-06 RX ORDER — PREDNISONE 20 MG/1
TABLET ORAL
Qty: 9 TAB | Refills: 0 | Status: SHIPPED | OUTPATIENT
Start: 2019-08-06 | End: 2019-12-23

## 2019-08-06 NOTE — ASSESSMENT & PLAN NOTE
This is a 58-year-old female who is here today complaining of an approximate 2 to 3-week history of intermittent sore throat.  Associated headaches.  Some coughing.  Coughing appears to be triggered by her sore throat.  She denies any known sick contacts.  Is concerned about strep throat.  Has a history of enlarged tonsils.  Gets sick not even once a year.  Has been taking Claritin.  Thought possibly she had allergies.

## 2019-08-06 NOTE — PROGRESS NOTES
Subjective:   Ramona Wilson is a 58 y.o. female here today for sore throat for almost 3 weeks with associated headache.  Also a history of a colonic polyp removed in 2012.    Acute pharyngitis  This is a 58-year-old female who is here today complaining of an approximate 2 to 3-week history of intermittent sore throat.  Associated headaches.  Some coughing.  Coughing appears to be triggered by her sore throat.  She denies any known sick contacts.  Is concerned about strep throat.  Has a history of enlarged tonsils.  Gets sick not even once a year.  Has been taking Claritin.  Thought possibly she had allergies.         Current medicines (including changes today)  Current Outpatient Medications   Medication Sig Dispense Refill   • loratadine (CLARITIN) 10 MG Tab Take 10 mg by mouth every day.     • predniSONE (DELTASONE) 20 MG Tab Take three tablets x 3 days. 9 Tab 0   • fluvoxAMINE (LUVOX) 50 MG Tab Take 50 mg by mouth every evening.     • Omega-3 Fatty Acids (FISH OIL) 1000 MG Cap capsule Take 1,000 mg by mouth 3 times a day, with meals.     • clobetasol (TEMOVATE) 0.05 % external solution Apply 1 Application to affected area(s) 2 times a day. 50 mL 5   • albuterol 108 (90 Base) MCG/ACT Aero Soln inhalation aerosol Inhale 2 Puffs by mouth every 6 hours as needed for Shortness of Breath. 1 Inhaler 0   • Multiple Vitamins-Minerals (MULTIVITAMIN ADULT PO) Take  by mouth.     • buPROPion (WELLBUTRIN XL) 300 MG XL tablet Take 1 Tab by mouth every morning. 90 Tab 3   • Cholecalciferol (VITAMIN D) 1000 UNIT CAPS Take  by mouth every day.       No current facility-administered medications for this visit.      She  has a past medical history of Anxiety (4/21/2009), Chronic low back pain (4/21/2009), Dyslipidemia (4/21/2009), Eczema (4/21/2009), Hypovitaminosis D (4/23/2009), RLS (restless legs syndrome), Sleep apnea, and Surveillance for birth control, oral contraceptives (4/21/2009).    Social History and Family History  "were reviewed and updated.    ROS   No chest pain, no shortness of breath, no abdominal pain and all other systems were reviewed and are negative.       Objective:     /84 (BP Location: Right arm, Patient Position: Sitting, BP Cuff Size: Adult)   Pulse 78   Temp 36.6 °C (97.8 °F) (Temporal)   Resp 16   Ht 1.575 m (5' 2\")   Wt 88.5 kg (195 lb)   SpO2 95%  Body mass index is 35.67 kg/m².   Physical Exam:  Constitutional: Alert, no distress.  Skin: Warm, dry, good turgor, no rashes in visible areas.  Eye: Equal, round and reactive, conjunctiva clear, lids normal.  ENMT: Lips without lesions, good dentition, oropharynx clear.  Slight erythema.  Tonsils benign-appearing.  Neck: Trachea midline, no masses.   Lymph: No cervical or supraclavicular lymphadenopathy  Respiratory: Unlabored respiratory effort, lungs clear to auscultation, no wheezes, no ronchi.  Cardiovascular: Normal S1, S2, no murmur, no edema.  Psych: Alert and oriented x3, normal affect and mood.        Assessment and Plan:   The following treatment plan was discussed    1. Acute pharyngitis, unspecified etiology  Acute, new onset condition.  Rapid strep test was negative.  Likely viral process.  Provided prednisone to take as directed.  Contact me in my chart with any worsening concerns.  Continue allergy medication.  - predniSONE (DELTASONE) 20 MG Tab; Take three tablets x 3 days.  Dispense: 9 Tab; Refill: 0  - POCT Rapid Strep A    2. History of colon polyps  Excised in 2012.  Inflammatory polyp.  Referred to GI to discuss possible surveillance colonoscopy.    3. Screening for colorectal cancer  Referred to GI to discuss colonoscopy.  - REFERRAL TO GI FOR COLONOSCOPY    Patient was seen for 25 minutes face to face of which > 50% of appointment time was spent on counseling and coordination of care regarding the above.    Followup: Return if symptoms worsen or fail to improve.    Please note that this dictation was created using voice " recognition software. I have made every reasonable attempt to correct obvious errors, but I expect that there are errors of grammar and possibly content that I did not discover before finalizing the note.

## 2019-08-06 NOTE — TELEPHONE ENCOUNTER
Please notify patient have submitted the referral to orthopedics for the heel pain last week and for the shoulder pain today.  I am not sure if the specialist will be able to address both at the same visit because [facility] has a foot specialist clinic as well as a general orthopedic clinic for shoulders.

## 2019-10-16 ENCOUNTER — TELEPHONE (OUTPATIENT)
Dept: MEDICAL GROUP | Facility: MEDICAL CENTER | Age: 59
End: 2019-10-16

## 2019-10-16 NOTE — TELEPHONE ENCOUNTER
CALLER: Ramona Wilson    PHONE:[phone] (home)     MESSAGE:    Pt left VM states she injured her toe about one month ago. States she is still having a lot of pain in her toe and now it is blistered and filled with puss and blood  Pt states it is also very painful to walk   PLEASE ADVISE

## 2019-10-16 NOTE — TELEPHONE ENCOUNTER
Please notify patient she needs to be seen and evaluated for this. Have her go to urgent care today to be evaluated.

## 2019-10-18 ENCOUNTER — OFFICE VISIT (OUTPATIENT)
Dept: MEDICAL GROUP | Facility: MEDICAL CENTER | Age: 59
End: 2019-10-18
Payer: COMMERCIAL

## 2019-10-18 VITALS
DIASTOLIC BLOOD PRESSURE: 86 MMHG | HEART RATE: 69 BPM | SYSTOLIC BLOOD PRESSURE: 122 MMHG | HEIGHT: 62 IN | OXYGEN SATURATION: 97 % | BODY MASS INDEX: 35.88 KG/M2 | TEMPERATURE: 97.6 F | RESPIRATION RATE: 16 BRPM | WEIGHT: 195 LBS

## 2019-10-18 DIAGNOSIS — L60.0 INGROWN TOENAIL WITH INFECTION: ICD-10-CM

## 2019-10-18 DIAGNOSIS — Z23 NEED FOR VACCINATION: ICD-10-CM

## 2019-10-18 PROCEDURE — 90686 IIV4 VACC NO PRSV 0.5 ML IM: CPT | Performed by: NURSE PRACTITIONER

## 2019-10-18 PROCEDURE — 99214 OFFICE O/P EST MOD 30 MIN: CPT | Mod: 25 | Performed by: NURSE PRACTITIONER

## 2019-10-18 PROCEDURE — 90471 IMMUNIZATION ADMIN: CPT | Performed by: NURSE PRACTITIONER

## 2019-10-18 RX ORDER — SULFAMETHOXAZOLE AND TRIMETHOPRIM 800; 160 MG/1; MG/1
1 TABLET ORAL 2 TIMES DAILY
Qty: 14 TAB | Refills: 0 | Status: SHIPPED | OUTPATIENT
Start: 2019-10-18 | End: 2019-10-25

## 2019-10-18 NOTE — ASSESSMENT & PLAN NOTE
Started over 1 month ago after stubbing toe. Felt like she cracked the nail. Since then toe has become swollen, very painful, draining pus and blood. Has been keeping it wrapped, using antibiotic ointment OTC, and aquaphor. Noticed a smell last weekend and realized it may be infected.     Denies fevers, chills, body aches. Not diabetic. Has never had an infection or ingrown toenail in the past.

## 2019-10-18 NOTE — PROGRESS NOTES
Subjective:   Ramona Wilson is a 58 y.o. female here today for ingrown toenail:     Ingrown toenail with infection  Started over 1 month ago after stubbing toe. Felt like she cracked the nail. Since then toe has become swollen, very painful, draining pus and blood. Has been keeping it wrapped, using antibiotic ointment OTC, and aquaphor. Noticed a smell last weekend and realized it may be infected.     Denies fevers, chills, body aches. Not diabetic. Has never had an infection or ingrown toenail in the past.         Current medicines (including changes today)  Current Outpatient Medications   Medication Sig Dispense Refill   • sulfamethoxazole-trimethoprim (BACTRIM DS) 800-160 MG tablet Take 1 Tab by mouth 2 times a day for 7 days. 14 Tab 0   • mupirocin (BACTROBAN) 2 % Ointment Apply 1 Application to affected area(s) 2 times a day. 1 Tube 0   • loratadine (CLARITIN) 10 MG Tab Take 10 mg by mouth every day.     • predniSONE (DELTASONE) 20 MG Tab Take three tablets x 3 days. 9 Tab 0   • fluvoxAMINE (LUVOX) 50 MG Tab Take 50 mg by mouth every evening.     • Omega-3 Fatty Acids (FISH OIL) 1000 MG Cap capsule Take 1,000 mg by mouth 3 times a day, with meals.     • clobetasol (TEMOVATE) 0.05 % external solution Apply 1 Application to affected area(s) 2 times a day. 50 mL 5   • albuterol 108 (90 Base) MCG/ACT Aero Soln inhalation aerosol Inhale 2 Puffs by mouth every 6 hours as needed for Shortness of Breath. 1 Inhaler 0   • Multiple Vitamins-Minerals (MULTIVITAMIN ADULT PO) Take  by mouth.     • buPROPion (WELLBUTRIN XL) 300 MG XL tablet Take 1 Tab by mouth every morning. 90 Tab 3   • Cholecalciferol (VITAMIN D) 1000 UNIT CAPS Take  by mouth every day.       No current facility-administered medications for this visit.      She  has a past medical history of Anxiety (4/21/2009), Chronic low back pain (4/21/2009), Dyslipidemia (4/21/2009), Eczema (4/21/2009), Hypovitaminosis D (4/23/2009), RLS (restless legs  "syndrome), Sleep apnea, and Surveillance for birth control, oral contraceptives (4/21/2009).    ROS  No chest pain, no shortness of breath, no abdominal pain  Positive ROS as per HPI.  All other systems reviewed and are negative.     Objective:     /86 (BP Location: Right arm, Patient Position: Sitting, BP Cuff Size: Adult)   Pulse 69   Temp 36.4 °C (97.6 °F) (Temporal)   Resp 16   Ht 1.575 m (5' 2\")   Wt 88.5 kg (195 lb)   SpO2 97%  Body mass index is 35.67 kg/m².     Physical Exam:  Constitutional: Alert, no distress.  Skin: Warm, dry, good turgor, no rashes in visible areas. +erythema, edema, and overgrowth of tissue on right lateral great toenail with ingrown nail, purulent, sanguinous drainage present, no fluctuant area noted.    Eye: Equal, round, conjunctiva clear, lids normal.  ENMT: Lips without lesions, good dentition,  Neck: Trachea midline,  Respiratory: Unlabored respiratory effort, lungs clear to auscultation, no wheezes, no ronchi.  Cardiovascular: Normal S1, S2, no murmur, no edema.  Psych: Alert and oriented x3, normal affect and mood.      Assessment and Plan:   The following treatment plan was discussed    1. Ingrown toenail with infection  Unstable  Obviously infected with foul smelling drainage  Bactrim DS BID x 7 days and mupirocin ointment BID  Soak twice daily in very warm water and epsom salt  Do not place occlusive dressing, may loosely apply gauze to soak up drainage.   Come in for wound check 7-10 days.   - sulfamethoxazole-trimethoprim (BACTRIM DS) 800-160 MG tablet; Take 1 Tab by mouth 2 times a day for 7 days.  Dispense: 14 Tab; Refill: 0  - mupirocin (BACTROBAN) 2 % Ointment; Apply 1 Application to affected area(s) 2 times a day.  Dispense: 1 Tube; Refill: 0    2. Need for vaccination  - Influenza Vaccine Quad Injection (PF)      Followup: Return in about 1 week (around 10/25/2019) for wound check.    I have placed the below orders and discussed them with an approved " delegating provider. The MA is performing the below orders under the direction of Dr. Boyle

## 2019-10-24 ENCOUNTER — APPOINTMENT (OUTPATIENT)
Dept: MEDICAL GROUP | Facility: MEDICAL CENTER | Age: 59
End: 2019-10-24
Payer: COMMERCIAL

## 2019-10-31 ENCOUNTER — OFFICE VISIT (OUTPATIENT)
Dept: URGENT CARE | Facility: MEDICAL CENTER | Age: 59
End: 2019-10-31
Payer: COMMERCIAL

## 2019-10-31 VITALS
WEIGHT: 198.4 LBS | RESPIRATION RATE: 12 BRPM | HEART RATE: 77 BPM | OXYGEN SATURATION: 94 % | BODY MASS INDEX: 33.05 KG/M2 | HEIGHT: 65 IN | DIASTOLIC BLOOD PRESSURE: 80 MMHG | SYSTOLIC BLOOD PRESSURE: 122 MMHG | TEMPERATURE: 98.7 F

## 2019-10-31 DIAGNOSIS — L60.0 INGROWN TOENAIL OF LEFT FOOT WITH INFECTION: ICD-10-CM

## 2019-10-31 PROCEDURE — 99214 OFFICE O/P EST MOD 30 MIN: CPT | Performed by: NURSE PRACTITIONER

## 2019-10-31 RX ORDER — DOXYCYCLINE HYCLATE 100 MG
100 TABLET ORAL 2 TIMES DAILY
Qty: 20 TAB | Refills: 0 | Status: SHIPPED | OUTPATIENT
Start: 2019-10-31 | End: 2019-11-10

## 2019-10-31 RX ORDER — SULFAMETHOXAZOLE AND TRIMETHOPRIM 200; 40 MG/5ML; MG/5ML
8 SUSPENSION ORAL 2 TIMES DAILY
COMMUNITY
End: 2019-12-23

## 2019-10-31 ASSESSMENT — ENCOUNTER SYMPTOMS
CARDIOVASCULAR NEGATIVE: 1
RESPIRATORY NEGATIVE: 1
NEUROLOGICAL NEGATIVE: 1
MUSCULOSKELETAL NEGATIVE: 1
FEVER: 1

## 2019-10-31 NOTE — PROGRESS NOTES
Subjective:     Ramona Wilson is a 58 y.o. female who presents for Foot Pain (FV from (L) toe infection,swollen/redness/drainage, low grade fever x1week, )       Other   This is a new problem. The problem has been gradually worsening. Associated symptoms include a fever.     Patient presents to urgent care for follow-up of ingrown toenail infection.  She was started on a 7-day course of Bactrim on 10/18/2019.  Patient reports she finished her antibiotics 6 days ago.  Reports some improvement in her symptoms of redness, swelling, and drainage.  However, she reports that 6 days ago she started to develop a low-grade fever of 100.2 and generally not feeling well.    PMH:  has a past medical history of Anxiety (4/21/2009), Chronic low back pain (4/21/2009), Dyslipidemia (4/21/2009), Eczema (4/21/2009), Hypovitaminosis D (4/23/2009), RLS (restless legs syndrome), Sleep apnea, and Surveillance for birth control, oral contraceptives (4/21/2009).    MEDS:   Current Outpatient Medications:   •  sulfamethoxazole-trimethoprim 200-40 mg/5 mL (BACTRIM,SEPTRA) 200-40 MG/5ML Suspension, Take 8 mg/kg/day by mouth 2 times a day., Disp: , Rfl:   •  doxycycline (VIBRAMYCIN) 100 MG Tab, Take 1 Tab by mouth 2 times a day for 10 days., Disp: 20 Tab, Rfl: 0  •  mupirocin (BACTROBAN) 2 % Ointment, Apply 1 Application to affected area(s) 2 times a day., Disp: 1 Tube, Rfl: 0  •  loratadine (CLARITIN) 10 MG Tab, Take 10 mg by mouth every day., Disp: , Rfl:   •  predniSONE (DELTASONE) 20 MG Tab, Take three tablets x 3 days., Disp: 9 Tab, Rfl: 0  •  Omega-3 Fatty Acids (FISH OIL) 1000 MG Cap capsule, Take 1,000 mg by mouth 3 times a day, with meals., Disp: , Rfl:   •  clobetasol (TEMOVATE) 0.05 % external solution, Apply 1 Application to affected area(s) 2 times a day., Disp: 50 mL, Rfl: 5  •  Multiple Vitamins-Minerals (MULTIVITAMIN ADULT PO), Take  by mouth., Disp: , Rfl:   •  buPROPion (WELLBUTRIN XL) 300 MG XL tablet, Take 1 Tab by  "mouth every morning., Disp: 90 Tab, Rfl: 3  •  Cholecalciferol (VITAMIN D) 1000 UNIT CAPS, Take  by mouth every day., Disp: , Rfl:   •  fluvoxAMINE (LUVOX) 50 MG Tab, Take 50 mg by mouth every evening., Disp: , Rfl:   •  albuterol 108 (90 Base) MCG/ACT Aero Soln inhalation aerosol, Inhale 2 Puffs by mouth every 6 hours as needed for Shortness of Breath. (Patient not taking: Reported on 10/31/2019), Disp: 1 Inhaler, Rfl: 0    ALLERGIES:   Allergies   Allergen Reactions   • Amoxicillin Hives     SURGHX:   Past Surgical History:   Procedure Laterality Date   • PB REDUCTION OF LARGE BREAST     • MAMMOPLASTY REDUCTION  2010    Performed by LUANNE LARRY at [facility] ORS   • PRIMARY C SECTION         • MOSHE BY LAPAROSCOPY       SOCHX:  reports that she has never smoked. She has never used smokeless tobacco. She reports that she does not drink alcohol or use drugs.     FH: Reviewed with patient, not pertinent to this visit.    Review of Systems   Constitutional: Positive for fever and malaise/fatigue.   Respiratory: Negative.    Cardiovascular: Negative.    Musculoskeletal: Negative.    Skin:        L great toe redness, swelling, drainage   Neurological: Negative.    All other systems reviewed and are negative.    Objective:     /80 (BP Location: Left arm, Patient Position: Sitting, BP Cuff Size: Adult)   Pulse 77   Temp 37.1 °C (98.7 °F) (Temporal)   Resp 12   Ht 1.651 m (5' 5\")   Wt 90 kg (198 lb 6.4 oz)   SpO2 94%   BMI 33.02 kg/m²     Physical Exam   Constitutional: She is oriented to person, place, and time. She appears well-developed and well-nourished. She is cooperative.  Non-toxic appearance. No distress.   HENT:   Head: Normocephalic.   Right Ear: External ear normal.   Left Ear: External ear normal.   Nose: Nose normal.   Eyes: Conjunctivae and EOM are normal.   Neck: Normal range of motion.   Cardiovascular: Normal rate and intact distal pulses. "   Pulmonary/Chest: Effort normal. No respiratory distress.   Musculoskeletal: Normal range of motion. She exhibits no deformity.        Left foot: There is normal range of motion and normal capillary refill.        Feet:    Neurological: She is alert and oriented to person, place, and time. She has normal strength. No sensory deficit. Coordination normal.   Skin: Skin is warm and dry. Capillary refill takes less than 2 seconds. She is not diaphoretic. No pallor.   Psychiatric: She has a normal mood and affect. Her behavior is normal.   Vitals reviewed.       Assessment/Plan:     1. Ingrown toenail of left foot with infection  - doxycycline (VIBRAMYCIN) 100 MG Tab; Take 1 Tab by mouth 2 times a day for 10 days.  Dispense: 20 Tab; Refill: 0    Rx as above sent electronically. Pt reports allergy to penicillins.    Warning signs reviewed. Strict return/ER precautions advised.    Patient advised to: Return for 1) Symptoms don't improve or worsen, or go to ER, 2) Follow up with primary care in 7-10 days.    Differential diagnosis, natural history, supportive care, and indications for immediate follow-up discussed. All questions answered. Patient agrees with the plan of care.

## 2019-12-15 ENCOUNTER — OFFICE VISIT (OUTPATIENT)
Dept: URGENT CARE | Facility: CLINIC | Age: 59
End: 2019-12-15
Payer: COMMERCIAL

## 2019-12-15 VITALS
DIASTOLIC BLOOD PRESSURE: 84 MMHG | OXYGEN SATURATION: 93 % | SYSTOLIC BLOOD PRESSURE: 122 MMHG | TEMPERATURE: 98.8 F | BODY MASS INDEX: 34.15 KG/M2 | HEART RATE: 78 BPM | RESPIRATION RATE: 20 BRPM | WEIGHT: 200 LBS | HEIGHT: 64 IN

## 2019-12-15 DIAGNOSIS — N39.0 URINARY TRACT INFECTION WITHOUT HEMATURIA, SITE UNSPECIFIED: ICD-10-CM

## 2019-12-15 LAB
APPEARANCE UR: NORMAL
BILIRUB UR STRIP-MCNC: NEGATIVE MG/DL
COLOR UR AUTO: NORMAL
GLUCOSE UR STRIP.AUTO-MCNC: NEGATIVE MG/DL
KETONES UR STRIP.AUTO-MCNC: NEGATIVE MG/DL
LEUKOCYTE ESTERASE UR QL STRIP.AUTO: NORMAL
NITRITE UR QL STRIP.AUTO: NEGATIVE
PH UR STRIP.AUTO: 5.5 [PH] (ref 5–8)
PROT UR QL STRIP: NORMAL MG/DL
RBC UR QL AUTO: NORMAL
SP GR UR STRIP.AUTO: 1.01
UROBILINOGEN UR STRIP-MCNC: 0.2 MG/DL

## 2019-12-15 PROCEDURE — 81002 URINALYSIS NONAUTO W/O SCOPE: CPT | Performed by: PHYSICIAN ASSISTANT

## 2019-12-15 PROCEDURE — 99214 OFFICE O/P EST MOD 30 MIN: CPT | Performed by: PHYSICIAN ASSISTANT

## 2019-12-15 RX ORDER — PHENAZOPYRIDINE HYDROCHLORIDE 200 MG/1
200 TABLET, FILM COATED ORAL 3 TIMES DAILY
Qty: 6 TAB | Refills: 0 | Status: SHIPPED | OUTPATIENT
Start: 2019-12-15 | End: 2019-12-17

## 2019-12-15 RX ORDER — FLUOXETINE HYDROCHLORIDE 40 MG/1
40 CAPSULE ORAL DAILY
COMMUNITY
End: 2021-08-02 | Stop reason: SDUPTHER

## 2019-12-15 RX ORDER — NITROFURANTOIN 25; 75 MG/1; MG/1
100 CAPSULE ORAL EVERY 12 HOURS
Qty: 10 CAP | Refills: 0 | Status: SHIPPED | OUTPATIENT
Start: 2019-12-15 | End: 2019-12-23 | Stop reason: SDUPTHER

## 2019-12-15 ASSESSMENT — ENCOUNTER SYMPTOMS
SHORTNESS OF BREATH: 0
VOMITING: 0
NAUSEA: 0
FEVER: 0
BACK PAIN: 0
ABDOMINAL PAIN: 0
CHILLS: 0
PALPITATIONS: 0
FLANK PAIN: 0
COUGH: 0

## 2019-12-15 NOTE — PROGRESS NOTES
Subjective:      Ramona Wilson is a 58 y.o. female who presents with Burn (urinary burning, felt warm, discomfort, retention: trickle x 1 week)            UTI   This is a new problem. The current episode started in the past 7 days. The problem occurs constantly. Associated symptoms include urinary symptoms. Pertinent negatives include no abdominal pain, chest pain, chills, coughing, fever, nausea or vomiting. Nothing aggravates the symptoms. She has tried nothing for the symptoms.       Review of Systems   Constitutional: Negative for chills and fever.   Respiratory: Negative for cough and shortness of breath.    Cardiovascular: Negative for chest pain and palpitations.   Gastrointestinal: Negative for abdominal pain, nausea and vomiting.   Genitourinary: Positive for dysuria. Negative for flank pain, frequency, hematuria and urgency.   Musculoskeletal: Negative for back pain.   All other systems reviewed and are negative.    PMH:  has a past medical history of Anxiety (4/21/2009), Chronic low back pain (4/21/2009), Dyslipidemia (4/21/2009), Eczema (4/21/2009), Hypovitaminosis D (4/23/2009), RLS (restless legs syndrome), Sleep apnea, and Surveillance for birth control, oral contraceptives (4/21/2009).  MEDS:   Current Outpatient Medications:   •  miconazole (MONISTAT) 2 % Cream, Insert 1 Applicator in vagina every evening., Disp: , Rfl:   •  fluoxetine (PROZAC) 40 MG capsule, Take 40 mg by mouth every day., Disp: , Rfl:   •  nitrofurantoin monohyd macro (MACROBID) 100 MG Cap, Take 1 Cap by mouth every 12 hours for 5 days., Disp: 10 Cap, Rfl: 0  •  phenazopyridine (PYRIDIUM) 200 MG Tab, Take 1 Tab by mouth 3 times a day for 2 days., Disp: 6 Tab, Rfl: 0  •  loratadine (CLARITIN) 10 MG Tab, Take 10 mg by mouth every day., Disp: , Rfl:   •  fluvoxAMINE (LUVOX) 50 MG Tab, Take 50 mg by mouth every evening., Disp: , Rfl:   •  Omega-3 Fatty Acids (FISH OIL) 1000 MG Cap capsule, Take 1,000 mg by mouth 3 times a day,  with meals., Disp: , Rfl:   •  Multiple Vitamins-Minerals (MULTIVITAMIN ADULT PO), Take  by mouth., Disp: , Rfl:   •  buPROPion (WELLBUTRIN XL) 300 MG XL tablet, Take 1 Tab by mouth every morning., Disp: 90 Tab, Rfl: 3  •  Cholecalciferol (VITAMIN D) 1000 UNIT CAPS, Take  by mouth every day., Disp: , Rfl:   •  sulfamethoxazole-trimethoprim 200-40 mg/5 mL (BACTRIM,SEPTRA) 200-40 MG/5ML Suspension, Take 8 mg/kg/day by mouth 2 times a day., Disp: , Rfl:   •  mupirocin (BACTROBAN) 2 % Ointment, Apply 1 Application to affected area(s) 2 times a day. (Patient not taking: Reported on 12/15/2019), Disp: 1 Tube, Rfl: 0  •  predniSONE (DELTASONE) 20 MG Tab, Take three tablets x 3 days. (Patient not taking: Reported on 12/15/2019), Disp: 9 Tab, Rfl: 0  •  clobetasol (TEMOVATE) 0.05 % external solution, Apply 1 Application to affected area(s) 2 times a day. (Patient not taking: Reported on 12/15/2019), Disp: 50 mL, Rfl: 5  •  albuterol 108 (90 Base) MCG/ACT Aero Soln inhalation aerosol, Inhale 2 Puffs by mouth every 6 hours as needed for Shortness of Breath. (Patient not taking: Reported on 10/31/2019), Disp: 1 Inhaler, Rfl: 0  ALLERGIES:   Allergies   Allergen Reactions   • Amoxicillin Hives     SURGHX:   Past Surgical History:   Procedure Laterality Date   • PB REDUCTION OF LARGE BREAST     • MAMMOPLASTY REDUCTION  2010    Performed by LUANNE LARRY at [facility]S   • PRIMARY C SECTION         • MOSHE BY LAPAROSCOPY       SOCHX:  reports that she has never smoked. She has never used smokeless tobacco. She reports that she does not drink alcohol or use drugs.  FH: Family history was reviewed, no pertinent findings to report  Medications, Allergies, and current problem list reviewed today in Epic       Objective:     Blood Pressure 122/84 (BP Location: Right arm, Patient Position: Sitting, BP Cuff Size: Adult)   Pulse 78   Temperature 37.1 °C (98.8 °F) (Temporal)   Respiration 20   " Height 1.626 m (5' 4\")   Weight 90.7 kg (200 lb)   Last Menstrual Period  (Approximate)   Oxygen Saturation 93%   Breastfeeding? No Comment: LMP about 5 years ago   Body Mass Index 34.33 kg/m²      Physical Exam  Vitals signs reviewed.   Constitutional:       Appearance: She is well-developed.   HENT:      Head: Normocephalic and atraumatic.      Right Ear: External ear normal.      Left Ear: External ear normal.      Nose: Nose normal.   Neck:      Musculoskeletal: Normal range of motion and neck supple.   Cardiovascular:      Rate and Rhythm: Normal rate and regular rhythm.      Heart sounds: Normal heart sounds.   Pulmonary:      Effort: Pulmonary effort is normal.      Breath sounds: Normal breath sounds.   Abdominal:      General: Bowel sounds are normal. There is no distension.      Palpations: Abdomen is soft. There is no mass.      Tenderness: There is no tenderness. There is no guarding or rebound.      Hernia: No hernia is present.   Musculoskeletal:         General: No tenderness.      Comments: No CVA tenderness present.   Skin:     General: Skin is warm and dry.   Neurological:      Mental Status: She is alert and oriented to person, place, and time.   Psychiatric:         Behavior: Behavior normal.         Thought Content: Thought content normal.         Judgment: Judgment normal.                 Assessment/Plan:       1. Urinary tract infection without hematuria, site unspecified    - POCT Urinalysis  - nitrofurantoin monohyd macro (MACROBID) 100 MG Cap; Take 1 Cap by mouth every 12 hours for 5 days.  Dispense: 10 Cap; Refill: 0  - phenazopyridine (PYRIDIUM) 200 MG Tab; Take 1 Tab by mouth 3 times a day for 2 days.  Dispense: 6 Tab; Refill: 0    Differential diagnosis, natural history, supportive care discussed. Follow-up with primary care provider within 7-10 days, emergency room precautions discussed.  Patient and/or family appears understanding of information.  Handout and review of patients " diagnosis and treatment was discussed extensively.

## 2019-12-23 ENCOUNTER — HOSPITAL ENCOUNTER (OUTPATIENT)
Dept: LAB | Facility: MEDICAL CENTER | Age: 59
End: 2019-12-23
Attending: INTERNAL MEDICINE
Payer: COMMERCIAL

## 2019-12-23 DIAGNOSIS — N39.0 URINARY TRACT INFECTION WITHOUT HEMATURIA, SITE UNSPECIFIED: ICD-10-CM

## 2019-12-23 DIAGNOSIS — Z00.00 PREVENTATIVE HEALTH CARE: Chronic | ICD-10-CM

## 2019-12-23 LAB
APPEARANCE UR: CLEAR
BACTERIA #/AREA URNS HPF: NEGATIVE /HPF
BILIRUB UR QL STRIP.AUTO: NEGATIVE
COLOR UR: YELLOW
EPI CELLS #/AREA URNS HPF: NEGATIVE /HPF
GLUCOSE UR STRIP.AUTO-MCNC: NEGATIVE MG/DL
HYALINE CASTS #/AREA URNS LPF: ABNORMAL /LPF
KETONES UR STRIP.AUTO-MCNC: NEGATIVE MG/DL
LEUKOCYTE ESTERASE UR QL STRIP.AUTO: ABNORMAL
MICRO URNS: ABNORMAL
NITRITE UR QL STRIP.AUTO: NEGATIVE
PH UR STRIP.AUTO: 6 [PH] (ref 5–8)
PROT UR QL STRIP: NEGATIVE MG/DL
RBC # URNS HPF: ABNORMAL /HPF
RBC UR QL AUTO: ABNORMAL
SP GR UR STRIP.AUTO: 1.02
UROBILINOGEN UR STRIP.AUTO-MCNC: 0.2 MG/DL
WBC #/AREA URNS HPF: ABNORMAL /HPF

## 2019-12-23 PROCEDURE — 87077 CULTURE AEROBIC IDENTIFY: CPT

## 2019-12-23 PROCEDURE — 87086 URINE CULTURE/COLONY COUNT: CPT

## 2019-12-23 PROCEDURE — 87186 SC STD MICRODIL/AGAR DIL: CPT

## 2019-12-23 PROCEDURE — 81001 URINALYSIS AUTO W/SCOPE: CPT

## 2019-12-23 RX ORDER — PHENAZOPYRIDINE HYDROCHLORIDE 200 MG/1
200 TABLET, FILM COATED ORAL 3 TIMES DAILY PRN
Qty: 9 TAB | Refills: 0 | Status: SHIPPED | OUTPATIENT
Start: 2019-12-23 | End: 2020-03-03

## 2019-12-23 RX ORDER — NITROFURANTOIN 25; 75 MG/1; MG/1
100 CAPSULE ORAL EVERY 12 HOURS
Qty: 10 CAP | Refills: 0 | Status: SHIPPED | OUTPATIENT
Start: 2019-12-23 | End: 2019-12-28

## 2019-12-23 RX ORDER — SULFAMETHOXAZOLE AND TRIMETHOPRIM 200; 40 MG/5ML; MG/5ML
8 SUSPENSION ORAL 2 TIMES DAILY
Status: CANCELLED | OUTPATIENT
Start: 2019-12-23

## 2019-12-23 NOTE — TELEPHONE ENCOUNTER
She was given Macrodantin at urgent care, I will fill that one time but urgent care did not send the urine off for culture.  I do not have a documented urinary tract infection so she will need to go to the lab and drop off a urine sample.  That order has been placed.

## 2019-12-23 NOTE — TELEPHONE ENCOUNTER
Pt was given ABX for UTI that did not resolve and is starting to come back. Would like you to RF for her.       Was the patient seen in the last year in this department? Yes    Does patient have an active prescription for medications requested? No     Received Request Via: Patient

## 2019-12-26 ENCOUNTER — TELEPHONE (OUTPATIENT)
Dept: MEDICAL GROUP | Facility: MEDICAL CENTER | Age: 59
End: 2019-12-26

## 2019-12-26 DIAGNOSIS — N39.0 URINARY TRACT INFECTION WITHOUT HEMATURIA, SITE UNSPECIFIED: ICD-10-CM

## 2019-12-26 LAB
BACTERIA UR CULT: ABNORMAL
BACTERIA UR CULT: ABNORMAL
SIGNIFICANT IND 70042: ABNORMAL
SITE SITE: ABNORMAL
SOURCE SOURCE: ABNORMAL

## 2019-12-26 RX ORDER — SULFAMETHOXAZOLE AND TRIMETHOPRIM 800; 160 MG/1; MG/1
1 TABLET ORAL 2 TIMES DAILY
Qty: 14 TAB | Refills: 0 | Status: SHIPPED | OUTPATIENT
Start: 2019-12-26 | End: 2020-03-03

## 2019-12-26 NOTE — TELEPHONE ENCOUNTER
Please notify patient that the urine test does show an infection, I will send a prescription for Bactrim an antibiotic she can take twice a day for 7 days, the main side effects are nausea, diarrhea, rash, loose stools, any side effects to let us know.    I would like to repeat the urine test 1 week after completion of the antibiotics, I will place that order in the computer system.    A prescription for antibiotics has been sent to [facility] on [facility]

## 2019-12-27 NOTE — TELEPHONE ENCOUNTER
----- Message from Jordan Rand M.D. sent at 12/26/2019  9:45 AM PST -----  Please notify patient that the urine test does show an infection, I will send a prescription for Bactrim an antibiotic she can take twice a day for 7 days, the main side effects are nausea, diarrhea, rash, loose stools, any side effects to let us know.    I would like to repeat the urine test 1 week after completion of the antibiotics, I will place that order in the computer system.    A prescription for the antibiotic has been sent to [facility] on [facility]

## 2019-12-31 NOTE — TELEPHONE ENCOUNTER
Did she feel nauseous or have a rash?    She could try the medication one more time, if she has further nausea or vomiting, we will have to change medications since she is already allergic to amoxicillin, will need to try another medication but have her try the Bactrim 1 more time.

## 2019-12-31 NOTE — TELEPHONE ENCOUNTER
She said that she felt a little nauseous and threw up, and a little itchy no rash. She stopped taking it yesterday and felt better. I told her to try one more dosage this morning and give us a call tonight to let us know if she symptoms reappeared. And if it did then we will go on to change the meds.

## 2019-12-31 NOTE — TELEPHONE ENCOUNTER
Pt picked up at pharmacy, took 2 yesterday and 1 today. Then she vomited. I advised her to stop taking it until she heard from us.

## 2020-02-27 ENCOUNTER — TELEPHONE (OUTPATIENT)
Dept: MEDICAL GROUP | Facility: MEDICAL CENTER | Age: 60
End: 2020-02-27

## 2020-02-27 NOTE — TELEPHONE ENCOUNTER
Has she had any blood in her stools, fevers?  If the diarrhea is improving, and she is able to keep liquids down, then she does not need to be seen.

## 2020-02-27 NOTE — TELEPHONE ENCOUNTER
Ramona Rizzou [facility]  [phone]    Pt has been sick for 6 days. Reports vomiting (resolved after day 3) diarrhea x 6 days (starting to resolve), stomach pains. Wants to know if she should just self treat at home because its seems to be letting up. Please advise.

## 2020-02-28 NOTE — TELEPHONE ENCOUNTER
Pt states that she is improving, fevers only @ 99, able to keep liquids down, no blood and diarrhea is getting better slowly. She will try to overcome it from home but if she starts getting worse, she will call for an appt.

## 2020-03-03 ENCOUNTER — OFFICE VISIT (OUTPATIENT)
Dept: MEDICAL GROUP | Facility: MEDICAL CENTER | Age: 60
End: 2020-03-03
Payer: COMMERCIAL

## 2020-03-03 VITALS
HEIGHT: 64 IN | BODY MASS INDEX: 32.95 KG/M2 | WEIGHT: 193 LBS | HEART RATE: 93 BPM | TEMPERATURE: 98 F | OXYGEN SATURATION: 97 % | DIASTOLIC BLOOD PRESSURE: 86 MMHG | SYSTOLIC BLOOD PRESSURE: 144 MMHG

## 2020-03-03 DIAGNOSIS — M25.50 MULTIPLE JOINT PAIN: ICD-10-CM

## 2020-03-03 DIAGNOSIS — K90.41 GLUTEN-SENSITIVE ENTEROPATHY: ICD-10-CM

## 2020-03-03 DIAGNOSIS — R19.7 DIARRHEA, UNSPECIFIED TYPE: ICD-10-CM

## 2020-03-03 DIAGNOSIS — T75.89XA EXPOSURE TO CAT FECES, INITIAL ENCOUNTER: ICD-10-CM

## 2020-03-03 PROCEDURE — 99214 OFFICE O/P EST MOD 30 MIN: CPT | Performed by: INTERNAL MEDICINE

## 2020-03-03 RX ORDER — TOBRAMYCIN AND DEXAMETHASONE 3; 1 MG/ML; MG/ML
SUSPENSION/ DROPS OPHTHALMIC
COMMUNITY
Start: 2020-02-21 | End: 2021-12-06

## 2020-03-03 ASSESSMENT — FIBROSIS 4 INDEX: FIB4 SCORE: 1.36

## 2020-03-03 NOTE — PROGRESS NOTES
Established Patient    Ramona presents today with the following:    CC: Joint pain, diarrhea, concern for parasite infection related to cat feces    HPI:   59 y.o. female came in for above.    She has been sick for 11 days.  It started with nausea, vomiting, diarrhea, watery, every 15 minutes, left sided abdominal discomfort, fever 101 Fahrenheit. Denies any blood. Her fever is gone, abd pain better, but still having watery stool about 6 times a day. Her appetite is still bad.     The joint pain started a week ago, but worsen in the past few days.   It started at the right hand, then spread to other side, ankles and knees.  Is hard to bear weight.    She denies any skin rash, sore throat, cough, lymph node swelling.     She also noticed sensitivity to gluten-containing food.      She has been on multiple antibiotic courses for UTIs and bacterial vaginosis, last course 3-4 weeks ago.    ROS  10 systems reviewed, negative except mentioned as above.      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     • Omega-3 Fatty Acids (FISH OIL) 1000 MG Cap capsule Take 1,000 mg by mouth 3 times a day, with meals.     • Cholecalciferol (VITAMIN D) 1000 UNIT CAPS Take  by mouth every day.     •  "tobramycin-dexamethasone (TOBRADEX) 0.3-0.1 % Suspension PLACE 1 DROP INTO BOTH EYES 4 TIMES A DAY     • miconazole (MONISTAT) 2 % Cream Insert 1 Applicator in vagina every evening.     • fluvoxAMINE (LUVOX) 50 MG Tab Take 50 mg by mouth every evening.       No current facility-administered medications for this visit.          /86 (BP Location: Right arm, Patient Position: Sitting, BP Cuff Size: Adult)   Pulse 93   Temp 36.7 °C (98 °F)   Ht 1.626 m (5' 4\")   Wt 87.5 kg (193 lb)   SpO2 97%   BMI 33.13 kg/m²     Physical Exam  General: Alert and oriented, No apparent distress.  Eyes: Pupils are equal and reactive. No scleral icterus.  Throat: Clear no erythema or exudates noted.  Neck: Supple. No cervical or supraclavicular lymphadenopathy noted. Thyroid not enlarged.  Lungs: Clear to auscultation bilaterally without any wheezing, crepitations.  Cardiovascular: Regular rate and rhythm. No murmurs, rubs or gallops.  Abdomen: Bowel sound +, soft, non tender, no rebound or guarding, no palpable organomegaly  Extremities: No clubbing, cyanosis, edema.  Skin: No rash or suspicious skin lesions noted.  Neuro: A & O x 4. Normal speech and memory.    MSK: diffuse tender joint swelling in bilateral ankles with mild effusion. Bilateral hand swelling, most prominent on right 3rd finger around PIP joint.    Assessment and Plan    1. Multiple joint pain  2. Diarrhea, unspecified type  She may have a viral infection that can cause gastroenteritis and diffuse joint swelling.   Given her repeated antibiotic exposure since December, I would like to r/o C diff. Her VSS, no more fever / significant abd pain.  - If stool tests are neg, we will treat conservatively with NSAIDS. She is taking ibuprofen.  - Cdiff By PCR Rflx Toxin; Future  - CULTURE STOOL; Future  - CBC WITH DIFFERENTIAL; Future  - OCCULT BLOOD FECES IMMUNOASSAY; Future  - FECAL LACTOFERRIN QUALITATIVE; Future  - CRYPTO/GIARDIA RAPID ASSAY; Future  - Comp " Metabolic Panel; Future    3. Gluten-sensitive enteropathy  - T-TRANSGLUTAMINASE (TTG) IGA; Future  - IGA SERUM QUANT; Future    4. Exposure to cat feces, initial encounter  We can check for toxoplasma IGM given her concerns. Clinical picture unlikely.  - TOXOPLASMA AB IGG/IGM SERUM; Future        Return if symptoms worsen or fail to improve. will contact her with results.    Signed by: Marcy Kennedy M.D.

## 2020-03-05 LAB
ALBUMIN SERPL-MCNC: 3.4 G/DL (ref 3.8–4.9)
ALBUMIN/GLOB SERPL: 1.7 {RATIO} (ref 1.2–2.2)
ALP SERPL-CCNC: 54 IU/L (ref 39–117)
ALT SERPL-CCNC: 31 IU/L (ref 0–32)
AST SERPL-CCNC: 29 IU/L (ref 0–40)
BASOPHILS # BLD AUTO: 0 X10E3/UL (ref 0–0.2)
BASOPHILS NFR BLD AUTO: 0 %
BILIRUB SERPL-MCNC: 0.3 MG/DL (ref 0–1.2)
BUN SERPL-MCNC: 9 MG/DL (ref 6–24)
BUN/CREAT SERPL: 13 (ref 9–23)
CALCIUM SERPL-MCNC: 8.9 MG/DL (ref 8.7–10.2)
CHLORIDE SERPL-SCNC: 97 MMOL/L (ref 96–106)
CO2 SERPL-SCNC: 26 MMOL/L (ref 20–29)
CREAT SERPL-MCNC: 0.7 MG/DL (ref 0.57–1)
EOSINOPHIL # BLD AUTO: 0.1 X10E3/UL (ref 0–0.4)
EOSINOPHIL NFR BLD AUTO: 1 %
ERYTHROCYTE [DISTWIDTH] IN BLOOD BY AUTOMATED COUNT: 13.2 % (ref 11.7–15.4)
GLOBULIN SER CALC-MCNC: 2 G/DL (ref 1.5–4.5)
GLUCOSE SERPL-MCNC: 116 MG/DL (ref 65–99)
HCT VFR BLD AUTO: 39.6 % (ref 34–46.6)
HGB BLD-MCNC: 13.3 G/DL (ref 11.1–15.9)
IGA SERPL-MCNC: 122 MG/DL (ref 87–352)
IMM GRANULOCYTES # BLD AUTO: 0 X10E3/UL (ref 0–0.1)
IMM GRANULOCYTES NFR BLD AUTO: 0 %
IMMATURE CELLS  115398: NORMAL
LYMPHOCYTES # BLD AUTO: 1.4 X10E3/UL (ref 0.7–3.1)
LYMPHOCYTES NFR BLD AUTO: 16 %
Lab: NORMAL
MCH RBC QN AUTO: 29.7 PG (ref 26.6–33)
MCHC RBC AUTO-ENTMCNC: 33.6 G/DL (ref 31.5–35.7)
MCV RBC AUTO: 88 FL (ref 79–97)
MONOCYTES # BLD AUTO: 0.9 X10E3/UL (ref 0.1–0.9)
MONOCYTES NFR BLD AUTO: 10 %
MORPHOLOGY BLD-IMP: NORMAL
NEUTROPHILS # BLD AUTO: 6.2 X10E3/UL (ref 1.4–7)
NEUTROPHILS NFR BLD AUTO: 73 %
NRBC BLD AUTO-RTO: NORMAL %
PLATELET # BLD AUTO: 275 X10E3/UL (ref 150–450)
POTASSIUM SERPL-SCNC: 3.4 MMOL/L (ref 3.5–5.2)
PROT SERPL-MCNC: 5.4 G/DL (ref 6–8.5)
RBC # BLD AUTO: 4.48 X10E6/UL (ref 3.77–5.28)
SODIUM SERPL-SCNC: 140 MMOL/L (ref 134–144)
T GONDII IGG SERPL IA-ACNC: <3 IU/ML (ref 0–7.1)
T GONDII IGM SER IA-ACNC: <3 AU/ML (ref 0–7.9)
TTG IGA SER-ACNC: <2 U/ML (ref 0–3)
WBC # BLD AUTO: 8.6 X10E3/UL (ref 3.4–10.8)

## 2020-03-09 LAB
BACTERIA SPEC CULT: NORMAL
BACTERIA SPEC CULT: NORMAL
C DIFF TOX GENS STL QL NAA+PROBE: POSITIVE
CAMPYLOBACTER STL CULT: NORMAL
E COLI SXT STL QL IA: NEGATIVE
HEMOCCULT STL QL IA: POSITIVE
LACTOFERRIN STL-MCNC: 283.1 UG/ML(G) (ref 0–7.24)
SALM + SHIG STL CULT: NORMAL

## 2020-03-10 DIAGNOSIS — A04.72 C. DIFFICILE COLITIS: ICD-10-CM

## 2020-03-10 NOTE — PROGRESS NOTES
C diff toxin + result came back last night 10 PM. Called patient and sent vancomycin oral 125 mg every 6 hours for 10 days to pharmacy.  Instructed to follow-up in office in a few weeks to make sure her diarrhea and joint symptoms are resolving. She thinks her symptoms are slightly better. Currently, her large joint arthritis is likely due to reactive arthritis, but she will need to follow up to make sure this is not other inflammatory arthritis.

## 2020-03-28 PROBLEM — L60.0 INGROWN TOENAIL WITH INFECTION: Status: RESOLVED | Noted: 2019-10-18 | Resolved: 2020-03-28

## 2020-03-28 PROBLEM — J02.9 ACUTE PHARYNGITIS: Status: RESOLVED | Noted: 2019-08-06 | Resolved: 2020-03-28

## 2020-03-30 ENCOUNTER — OFFICE VISIT (OUTPATIENT)
Dept: MEDICAL GROUP | Facility: MEDICAL CENTER | Age: 60
End: 2020-03-30
Payer: COMMERCIAL

## 2020-03-30 VITALS
HEIGHT: 65 IN | DIASTOLIC BLOOD PRESSURE: 84 MMHG | SYSTOLIC BLOOD PRESSURE: 122 MMHG | HEART RATE: 90 BPM | TEMPERATURE: 97.7 F | WEIGHT: 190 LBS | BODY MASS INDEX: 31.65 KG/M2 | OXYGEN SATURATION: 94 %

## 2020-03-30 DIAGNOSIS — Z86.19 HISTORY OF CLOSTRIDIOIDES DIFFICILE COLITIS: ICD-10-CM

## 2020-03-30 PROCEDURE — 99213 OFFICE O/P EST LOW 20 MIN: CPT | Performed by: INTERNAL MEDICINE

## 2020-03-30 ASSESSMENT — PATIENT HEALTH QUESTIONNAIRE - PHQ9
SUM OF ALL RESPONSES TO PHQ QUESTIONS 1-9: 0
6. FEELING BAD ABOUT YOURSELF - OR THAT YOU ARE A FAILURE OR HAVE LET YOURSELF OR YOUR FAMILY DOWN: NOT AL ALL
1. LITTLE INTEREST OR PLEASURE IN DOING THINGS: NOT AT ALL
7. TROUBLE CONCENTRATING ON THINGS, SUCH AS READING THE NEWSPAPER OR WATCHING TELEVISION: NOT AT ALL
8. MOVING OR SPEAKING SO SLOWLY THAT OTHER PEOPLE COULD HAVE NOTICED. OR THE OPPOSITE, BEING SO FIGETY OR RESTLESS THAT YOU HAVE BEEN MOVING AROUND A LOT MORE THAN USUAL: NOT AT ALL
SUM OF ALL RESPONSES TO PHQ9 QUESTIONS 1 AND 2: 0
3. TROUBLE FALLING OR STAYING ASLEEP OR SLEEPING TOO MUCH: NOT AT ALL
2. FEELING DOWN, DEPRESSED, IRRITABLE, OR HOPELESS: NOT AT ALL
9. THOUGHTS THAT YOU WOULD BE BETTER OFF DEAD, OR OF HURTING YOURSELF: NOT AT ALL
4. FEELING TIRED OR HAVING LITTLE ENERGY: NOT AT ALL
5. POOR APPETITE OR OVEREATING: NOT AT ALL

## 2020-03-30 ASSESSMENT — FIBROSIS 4 INDEX: FIB4 SCORE: 1.12

## 2020-03-30 NOTE — PROGRESS NOTES
Subjective:      Ramona Wilson is a 59 y.o. female who presents with diarrhea Follow-Up            HPI  Her follow-up diarrhea. Seen by  3/3/20 C. difficile stool test positive, started vancomycin x10 days and no side effects, stools have returned to normal form, having more normal stools, did have nausea and emesis, the abdominal cramping has improved. Also had some joint pains around the time of the diarrhea, fingers were affected as well as wrists and both knees had been painful and swollen, left ankle as well with the c.diff diarrhea, did have low grade temperature, has been taking otc naprosyn.  No previous history of C. difficile, no obvious exposures.  Did have antibiotics in December and January for urinary tract infection, urine +December 24 Enterobacter resistant to ampicillin, cefazolin, intermediate nitrofurantoin, sensitive to Bactrim, initially seen in urgent care, provided nitrofurantoin given the sensitivities, switched to Bactrim in late December, completed course of that, had recurrent symptoms, given a second course of Bactrim, subsequent follow-up with her gynecologist apparently had positive urinalysis at some point in January, we do not have those records, that was not sent for culture, was given Bactrim again, also diagnosed with bacterial vaginosis by gynecology and provided metronidazole sometime in January.  She completed a course of that antibiotic.  No records from gynecology available    Records from urology reviewed  2/5/20  urology note urinalysis negative, postvoid residual 51 mL by bladder scan  2/12/20 urology note ultrasound renal left kidney 1.7 cm cyst, no hydronephrosis, no masses, no post void residual, no bladder thickening or mass      Current Outpatient Medications   Medication Sig Dispense Refill   • fluoxetine (PROZAC) 40 MG capsule Take 40 mg by mouth every day.     • Omega-3 Fatty Acids (FISH OIL) 1000 MG Cap capsule Take 1,000 mg by mouth 3 times a  day, with meals.     • Cholecalciferol (VITAMIN D) 1000 UNIT CAPS Take  by mouth every day.     • tobramycin-dexamethasone (TOBRADEX) 0.3-0.1 % Suspension PLACE 1 DROP INTO BOTH EYES 4 TIMES A DAY     • miconazole (MONISTAT) 2 % Cream Insert 1 Applicator in vagina every evening.     • fluvoxAMINE (LUVOX) 50 MG Tab Take 50 mg by mouth every evening.       No current facility-administered medications for this visit.      Status post cholecystectomy     Preventative health  5/21/12 colon per [facility] polyp pathology inflammatory, repeat in 10 years  9/30/15 tdap  12/15/15 pap negative per gyn  2/16/18 vit d 39 on 2000 units daily  3/7/18 dexa LS -1.2,hip 0.0  3/8/18 mammogram  10/26/18 [facility] negative     Left heel spur     Impaired glucose metabolism  12/6/16 A1c 5.8%,bs 86  2/16/18 A1c 5.6%     history sleep apnea  10/15/10 positive apnea link formal eval pending   9/3/15 [facility] sleep note long history of snoring, daytime somnolence, will be set up for polysomnogram evaluate for sleep apnea.  11/30/15 sleep study, CPAP was adjusted between 4 and 8, recommend bilevel  11/30/15 sleep study 2-27 minutes total, AHI 0.4, hypopnea index 26, supine AHI 27, mean saturation 93%, minimum saturation 86%, saturations below 89% for 0.9% of sleep time, cpap 4-8 AHI 21.7, mean saturation 93% with minimum saturation 87%  12/18/15 [facility] sleep note repeat cpap titration  2/17/16 [facility] sleep note, start autocpap 9-18, cnox after acclimated  2/15/18 off cpap    history low back pain  5/07 MRI lumbar spine L5-S1 small disc protrusion, L4-L5 broad-based disc   8/25/16  pain note left sacroiliac joint dysfunction, provided left sacroiliac steroidal joint injection under fluoroscopy  9/27/16  pain management procedure note, left L5 dorsal ramus and left S1-S3 nerve radiofrequency ablation under fluoroscopy  10/18/16  pain note s/p left L5S3 FJNA on 9/27/16 80% improvement in symptoms, recommend pennsaid 2%  topical, stop naproxen, trial physical therapy, consider ganglion nerve block  9/30/15 xray sacrum and coccyx slight posterior subluxation of the second coccygeal segment which may represent sequela of old injury,no acute sacral or coccygeal fracture,mild degenerative changes of each SI joint  9/30/15 referral to physical therapy     history elbow fracture  1/21/15 x-ray left elbow radial head fracture  3/19/15  orthopedic note, x-ray elbow no displacement, healed left radial head fracture     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 Would need referral ENT     Dyslipidemia  4/09 chol 150,trig, 258, hdl 30, ldl 68  10/10 chol 149,trig 256,hdl 38,ldl 60 start fish oil 2 bid  1/9/14 chol 179,trig 186,hdl 42,ldl 100  9/11/15 chol 175,trig 185,hdl 54,ldl 84  2/16/18 chol 150,trig 158,hdl 49,ldl 75  4/26/19 chol 166,trig 279,hdl 44,ldl 66      Depression  On effexor since 2006, failed celexa  10/10 increase effexor xr to 150 mg and add ativan 0.5 mg prn   10/7/13 continue effexor  mg, add wellbutrin 150 mg daily, PHQ score 17 declined psychotherapy  2/27/17 on effexor 150  mg and wellbutrin 150 mg bid  3/13/17 insurance not cover wellbutrin  mg bid, change to 300 mg qday  11/20/17 referral  at [facility] [place] [facility]  2/15/18 PHQ 8 sees  psychiatry on wellbutrin 300 mg, zoloft and adderall 5 mg bid   4/4/19 sees  psychiatry on wellbutrin 300 mg  5/23/19 started on luvox  psychiatry and wellbutrin      Cyst ovary  11/23/15 ultrasound gynecologic, prominent endometrial stripe, 3.3 cm right ovarian cyst          Patient Active Problem List   Diagnosis   • History low back pain   • Dyslipidemia   • Depression   • Preventative health care   • History of sleep apnea   • S/P cholecystectomy   • Pain of left heel   • Eagle's syndrome   •  "History elbow fracture, left   • Abnormal liver function   • Obesity   • Cyst of ovary, right   • Impaired fasting glucose   • History of colon polyps   • UTI (urinary tract infection)               Health Maintenance Summary                HEPATITIS C SCREENING Overdue 1960     IMM ZOSTER VACCINES Overdue 12/25/2010     COLONOSCOPY Overdue 5/21/2015      Done 5/21/2012 AMB REFERRAL TO GI FOR COLONOSCOPY    PAP SMEAR Overdue 12/15/2018      Done 12/15/2015 PAP SENT TO [facility].     Patient has more history with this topic...    MAMMOGRAM Next Due 5/28/2020      Done 5/28/2019 MA-SCREEN MAMMO W/CAD-BILAT     Patient has more history with this topic...    IMM DTaP/Tdap/Td Vaccine Next Due 9/30/2025      Done 9/30/2015 Imm Admin: Tdap Vaccine          Patient Care Team:  Jordan Rand M.D. as PCP - General    ROS       Objective:     /84 (BP Location: Right arm, Patient Position: Sitting, BP Cuff Size: Adult)   Pulse 90   Temp 36.5 °C (97.7 °F)   Ht 1.651 m (5' 5\")   Wt 86.2 kg (190 lb)   LMP 10/10/2010   SpO2 94%   BMI 31.62 kg/m²      Physical Exam  Vitals signs and nursing note reviewed.   Constitutional:       General: She is not in acute distress.     Appearance: Normal appearance.   HENT:      Head: Normocephalic and atraumatic.      Right Ear: External ear normal.      Left Ear: External ear normal.   Eyes:      Conjunctiva/sclera: Conjunctivae normal.   Neck:      Musculoskeletal: Neck supple.   Cardiovascular:      Rate and Rhythm: Normal rate and regular rhythm.      Heart sounds: Normal heart sounds. No murmur.   Pulmonary:      Effort: No respiratory distress.      Breath sounds: Normal breath sounds.   Abdominal:      General: There is no distension.   Skin:     General: Skin is warm.   Neurological:      General: No focal deficit present.      Mental Status: She is alert.   Psychiatric:         Mood and Affect: Mood normal.         Behavior: Behavior normal.     Mild edema of finger " joints            Assessment/Plan:       Assessment  #1 C. difficile colitis, improving, likely related to recent bouts of antibiotics for UTI first with Macrodantin, then with Bactrim, interestingly she did receive a course of metronidazole in January for bacterial vaginosis, no known exposures high risk patient such as those in the hospital nursing home, no history of immunosuppression, symptoms are improving with oral vancomycin    #2 recent UTI in December, and follow-up with urology, asymptomatic currently completing course of Bactrim    #3 reactive polyarthritis related to C. difficile colitis    Plan  #1 complete course of oral vancomycin, notify us if recurrence, no need to recheck stools since symptoms resolving    #2 she can continue naproxen as needed for reactive polyarthritis, since symptoms are improving no further follow-up testing necessary, monitor for GI upset with naproxen    #3 handwashing emphasized, hygiene discussed and emphasized for C. difficile as well as with coronavirus, social distancing precautions related to that discussed    #4 continue good nutrition, activity, exercise if possible    #5 notify us of any recurrent symptoms especially of diarrhea, in which case we can repeat stool test    #6 discussed risk factors with C. difficile, appropriate course of antibiotics, diagnostic evaluation plan if symptoms recur or recurrent infections occur

## 2020-06-17 ENCOUNTER — TELEPHONE (OUTPATIENT)
Dept: MEDICAL GROUP | Facility: MEDICAL CENTER | Age: 60
End: 2020-06-17

## 2020-06-17 NOTE — TELEPHONE ENCOUNTER
[facility]  [phone]    Pt called and requested that her Lab order be faxed to [facility]. FAXED.

## 2020-06-30 ENCOUNTER — TELEPHONE (OUTPATIENT)
Dept: MEDICAL GROUP | Facility: MEDICAL CENTER | Age: 60
End: 2020-06-30

## 2020-06-30 ENCOUNTER — HOSPITAL ENCOUNTER (OUTPATIENT)
Facility: MEDICAL CENTER | Age: 60
End: 2020-06-30
Attending: INTERNAL MEDICINE
Payer: COMMERCIAL

## 2020-06-30 DIAGNOSIS — N39.0 URINARY TRACT INFECTION WITHOUT HEMATURIA, SITE UNSPECIFIED: ICD-10-CM

## 2020-06-30 LAB
APPEARANCE UR: CLEAR
BACTERIA #/AREA URNS HPF: ABNORMAL /HPF
BILIRUB UR QL STRIP.AUTO: NEGATIVE
COLOR UR: ABNORMAL
EPI CELLS #/AREA URNS HPF: NEGATIVE /HPF
GLUCOSE UR STRIP.AUTO-MCNC: NEGATIVE MG/DL
HYALINE CASTS #/AREA URNS LPF: ABNORMAL /LPF
KETONES UR STRIP.AUTO-MCNC: NEGATIVE MG/DL
LEUKOCYTE ESTERASE UR QL STRIP.AUTO: ABNORMAL
MICRO URNS: ABNORMAL
NITRITE UR QL STRIP.AUTO: POSITIVE
PH UR STRIP.AUTO: 6 [PH] (ref 5–8)
PROT UR QL STRIP: NEGATIVE MG/DL
RBC # URNS HPF: ABNORMAL /HPF
RBC UR QL AUTO: ABNORMAL
SP GR UR STRIP.AUTO: 1.02
UROBILINOGEN UR STRIP.AUTO-MCNC: 1 MG/DL
WBC #/AREA URNS HPF: ABNORMAL /HPF

## 2020-06-30 PROCEDURE — 87077 CULTURE AEROBIC IDENTIFY: CPT

## 2020-06-30 PROCEDURE — 87086 URINE CULTURE/COLONY COUNT: CPT

## 2020-06-30 PROCEDURE — 87186 SC STD MICRODIL/AGAR DIL: CPT

## 2020-06-30 PROCEDURE — 81001 URINALYSIS AUTO W/SCOPE: CPT

## 2020-06-30 NOTE — TELEPHONE ENCOUNTER
Please notify the patient that we need to be sure she has a urine infection and if so what antibiotics can be used if she does have an infection.    I need to document that she has an infection, please have her go to the lab I will place the order for the urinalysis in the computer system.

## 2020-06-30 NOTE — TELEPHONE ENCOUNTER
Ramona Radhika [facility]  [phone]    Pt has recently recovered from C Diff. Now believes she has a UTI, which is how she got started with her C Diff infection. Does not wish to go to  for fear that they will Rx the incorrect ABX. Please advise.

## 2020-07-02 ENCOUNTER — TELEPHONE (OUTPATIENT)
Dept: MEDICAL GROUP | Facility: MEDICAL CENTER | Age: 60
End: 2020-07-02

## 2020-07-02 DIAGNOSIS — N39.0 URINARY TRACT INFECTION WITHOUT HEMATURIA, SITE UNSPECIFIED: ICD-10-CM

## 2020-07-02 RX ORDER — SULFAMETHOXAZOLE AND TRIMETHOPRIM 800; 160 MG/1; MG/1
1 TABLET ORAL 2 TIMES DAILY
Qty: 6 TAB | Refills: 0 | Status: SHIPPED | OUTPATIENT
Start: 2020-07-02 | End: 2020-07-16 | Stop reason: SDUPTHER

## 2020-07-02 NOTE — TELEPHONE ENCOUNTER
Left a message for patient to call back at [phone].     Rehana Alexander  [facility] Assistant

## 2020-07-02 NOTE — TELEPHONE ENCOUNTER
----- Message from Jordan Rand M.D. sent at 7/2/2020 12:22 PM PDT -----  Please notify the patient that her urine test does show an infection.  Since she has had C. difficile infection previously related to antibiotics, I would like to use as short a ourse as possible, I will give her a prescription for Bactrim, the bacteria is sensitive to this medication.  So this medication will work, we will do one pill twice a day for 3 days.  I will send a prescription to [facility].

## 2020-07-02 NOTE — TELEPHONE ENCOUNTER
Please notify the patient that her urine test does show an infection.  Since she has had C. difficile infection previously related to antibiotics, I would like to use as short a ourse as possible, I will give her a prescription for Bactrim, the bacteria is sensitive to this medication.  So this medication will work, we will do one pill twice a day for 3 days.  I will send a prescription to [facility].

## 2020-07-16 ENCOUNTER — HOSPITAL ENCOUNTER (OUTPATIENT)
Facility: MEDICAL CENTER | Age: 60
End: 2020-07-16
Attending: INTERNAL MEDICINE
Payer: COMMERCIAL

## 2020-07-16 ENCOUNTER — TELEPHONE (OUTPATIENT)
Dept: MEDICAL GROUP | Facility: MEDICAL CENTER | Age: 60
End: 2020-07-16

## 2020-07-16 ENCOUNTER — NON-PROVIDER VISIT (OUTPATIENT)
Dept: MEDICAL GROUP | Facility: MEDICAL CENTER | Age: 60
End: 2020-07-16
Payer: COMMERCIAL

## 2020-07-16 DIAGNOSIS — N30.00 ACUTE CYSTITIS WITHOUT HEMATURIA: ICD-10-CM

## 2020-07-16 DIAGNOSIS — R30.0 DYSURIA: ICD-10-CM

## 2020-07-16 LAB
APPEARANCE UR: NORMAL
BILIRUB UR STRIP-MCNC: NORMAL MG/DL
COLOR UR AUTO: NORMAL
GLUCOSE UR STRIP.AUTO-MCNC: NORMAL MG/DL
KETONES UR STRIP.AUTO-MCNC: NORMAL MG/DL
LEUKOCYTE ESTERASE UR QL STRIP.AUTO: NORMAL
NITRITE UR QL STRIP.AUTO: NORMAL
PH UR STRIP.AUTO: 5 [PH] (ref 5–8)
PROT UR QL STRIP: NORMAL MG/DL
RBC UR QL AUTO: NORMAL
SP GR UR STRIP.AUTO: 1.02
UROBILINOGEN UR STRIP-MCNC: 0.2 MG/DL

## 2020-07-16 PROCEDURE — 87077 CULTURE AEROBIC IDENTIFY: CPT

## 2020-07-16 PROCEDURE — 87186 SC STD MICRODIL/AGAR DIL: CPT

## 2020-07-16 PROCEDURE — 87086 URINE CULTURE/COLONY COUNT: CPT

## 2020-07-16 PROCEDURE — 81002 URINALYSIS NONAUTO W/O SCOPE: CPT | Performed by: INTERNAL MEDICINE

## 2020-07-16 RX ORDER — SULFAMETHOXAZOLE AND TRIMETHOPRIM 800; 160 MG/1; MG/1
1 TABLET ORAL 2 TIMES DAILY
Qty: 6 TAB | Refills: 0 | Status: SHIPPED | OUTPATIENT
Start: 2020-07-16 | End: 2020-07-19

## 2020-07-16 NOTE — NON-PROVIDER
Ramona Wilson is a 59 y.o. female here for a non-provider visit for UA for Dysuria    If abnormal was an in office provider notified today (if so, indicate provider)? Yes  Routed to PCP? Yes

## 2020-07-16 NOTE — TELEPHONE ENCOUNTER
Pt states that she has some burning upon urination and is done with her ABX. Please advise what she should do next.

## 2020-07-16 NOTE — TELEPHONE ENCOUNTER
Please have the patient drop off another urine specimen at the lab, I will place the order in the computer

## 2020-07-19 DIAGNOSIS — N30.00 ACUTE CYSTITIS WITHOUT HEMATURIA: ICD-10-CM

## 2020-07-19 RX ORDER — NITROFURANTOIN 25; 75 MG/1; MG/1
100 CAPSULE ORAL 2 TIMES DAILY
Qty: 10 CAP | Refills: 0 | Status: SHIPPED | OUTPATIENT
Start: 2020-07-19 | End: 2020-07-24

## 2020-07-19 NOTE — PROGRESS NOTES
Notified with results, UTI E. coli but resistant to Bactrim which we have given her, we will send prescription for Macrobid only 5 days twice daily for short course, since she has had C. difficile in the past, she still having some symptoms of urinary urgency but no symptoms of pyelonephritis.  Monitor for C. difficile recurrence.

## 2020-08-06 ENCOUNTER — HOSPITAL ENCOUNTER (OUTPATIENT)
Dept: RADIOLOGY | Facility: MEDICAL CENTER | Age: 60
End: 2020-08-06
Attending: INTERNAL MEDICINE
Payer: COMMERCIAL

## 2020-08-06 ENCOUNTER — TELEPHONE (OUTPATIENT)
Dept: MEDICAL GROUP | Facility: MEDICAL CENTER | Age: 60
End: 2020-08-06

## 2020-08-06 DIAGNOSIS — Z12.31 VISIT FOR SCREENING MAMMOGRAM: ICD-10-CM

## 2020-08-06 DIAGNOSIS — R92.8 ABNORMAL MAMMOGRAM OF RIGHT BREAST: ICD-10-CM

## 2020-08-06 PROCEDURE — 77067 SCR MAMMO BI INCL CAD: CPT

## 2020-08-06 NOTE — TELEPHONE ENCOUNTER
Please notify the patient that her mammogram does show a right-sided density, the radiologist recommends doing a right diagnostic mammogram and right ultrasound.  I have ordered those in the computer system, she can call [phone] to schedule the tests.

## 2020-08-07 NOTE — TELEPHONE ENCOUNTER
----- Message from Jordan Rand M.D. sent at 8/6/2020  4:45 PM PDT -----  Please notify the patient that her mammogram does show a right-sided density, the radiologist recommends doing a right diagnostic mammogram and right ultrasound.  I have ordered those in the computer system, she can call [phone] to schedule the tests.

## 2020-08-10 ENCOUNTER — HOSPITAL ENCOUNTER (OUTPATIENT)
Dept: RADIOLOGY | Facility: MEDICAL CENTER | Age: 60
End: 2020-08-10
Attending: INTERNAL MEDICINE
Payer: COMMERCIAL

## 2020-08-10 DIAGNOSIS — R92.8 ABNORMAL MAMMOGRAM: ICD-10-CM

## 2020-08-10 PROCEDURE — G0279 TOMOSYNTHESIS, MAMMO: HCPCS | Mod: RT

## 2020-08-10 PROCEDURE — 76642 ULTRASOUND BREAST LIMITED: CPT | Mod: RT

## 2020-08-12 ENCOUNTER — HOSPITAL ENCOUNTER (OUTPATIENT)
Dept: RADIOLOGY | Facility: MEDICAL CENTER | Age: 60
End: 2020-08-12
Attending: INTERNAL MEDICINE
Payer: COMMERCIAL

## 2020-08-12 DIAGNOSIS — R92.8 ABNORMAL MAMMOGRAM: ICD-10-CM

## 2020-08-12 LAB — PATHOLOGY CONSULT NOTE: NORMAL

## 2020-08-12 PROCEDURE — 88305 TISSUE EXAM BY PATHOLOGIST: CPT

## 2020-08-12 PROCEDURE — 19083 BX BREAST 1ST LESION US IMAG: CPT | Mod: RT

## 2020-08-13 ENCOUNTER — TELEPHONE (OUTPATIENT)
Dept: MEDICAL GROUP | Facility: MEDICAL CENTER | Age: 60
End: 2020-08-13

## 2020-08-13 ENCOUNTER — TELEPHONE (OUTPATIENT)
Dept: RADIOLOGY | Facility: MEDICAL CENTER | Age: 60
End: 2020-08-13

## 2020-08-13 NOTE — TELEPHONE ENCOUNTER
CALLED PT TO LET HER KNOW BX RESULTS CAME BACK, PT WAS TRANSFERRED BACK TO RAD TO DISCUSS THOSE RESULTS/TJS

## 2020-08-14 NOTE — TELEPHONE ENCOUNTER
----- Message from Jordan Rand M.D. sent at 8/13/2020 12:34 PM PDT -----  Please notify patient that her breast biopsy is negative.  I believe the imaging department will contact her to set up a repeat mammogram in 6 months.

## 2021-03-15 ENCOUNTER — TELEPHONE (OUTPATIENT)
Dept: MEDICAL GROUP | Facility: MEDICAL CENTER | Age: 61
End: 2021-03-15

## 2021-03-15 DIAGNOSIS — R19.7 DIARRHEA, UNSPECIFIED TYPE: ICD-10-CM

## 2021-03-15 DIAGNOSIS — Z23 NEED FOR VACCINATION: ICD-10-CM

## 2021-03-15 NOTE — TELEPHONE ENCOUNTER
Ramona Radhika [facility]  [phone]    Pt call and LM, states that she is having GI issues and wants to know if she can get an order to check for C-Dif.  Please advise.

## 2021-03-15 NOTE — TELEPHONE ENCOUNTER
What symptoms has she been having?  If she has been having diarrhea I will place the order in the computer system, she will need to go to the lab to  the specimen kit.

## 2021-04-09 ENCOUNTER — HOSPITAL ENCOUNTER (OUTPATIENT)
Facility: MEDICAL CENTER | Age: 61
End: 2021-04-09
Attending: INTERNAL MEDICINE
Payer: COMMERCIAL

## 2021-04-09 DIAGNOSIS — R19.7 DIARRHEA, UNSPECIFIED TYPE: ICD-10-CM

## 2021-04-09 LAB
C DIFF DNA SPEC QL NAA+PROBE: NEGATIVE
C DIFF TOX GENS STL QL NAA+PROBE: NEGATIVE

## 2021-04-09 PROCEDURE — 87493 C DIFF AMPLIFIED PROBE: CPT

## 2021-04-11 ENCOUNTER — TELEPHONE (OUTPATIENT)
Dept: MEDICAL GROUP | Facility: MEDICAL CENTER | Age: 61
End: 2021-04-11

## 2021-04-12 ENCOUNTER — TELEPHONE (OUTPATIENT)
Dept: MEDICAL GROUP | Facility: MEDICAL CENTER | Age: 61
End: 2021-04-12

## 2021-04-12 NOTE — TELEPHONE ENCOUNTER
----- Message from Jordan Rand M.D. sent at 4/11/2021  2:16 PM PDT -----  Please notify the patient that her stool C. difficile test is negative

## 2021-04-21 ENCOUNTER — IMMUNIZATION (OUTPATIENT)
Dept: FAMILY PLANNING/WOMEN'S HEALTH CLINIC | Facility: IMMUNIZATION CENTER | Age: 61
End: 2021-04-21
Attending: INTERNAL MEDICINE
Payer: COMMERCIAL

## 2021-04-21 DIAGNOSIS — Z23 NEED FOR VACCINATION: ICD-10-CM

## 2021-04-21 DIAGNOSIS — Z23 ENCOUNTER FOR VACCINATION: Primary | ICD-10-CM

## 2021-04-21 PROCEDURE — 91300 PFIZER SARS-COV-2 VACCINE: CPT | Performed by: INTERNAL MEDICINE

## 2021-04-21 PROCEDURE — 0001A PFIZER SARS-COV-2 VACCINE: CPT | Performed by: INTERNAL MEDICINE

## 2021-05-01 ENCOUNTER — PATIENT OUTREACH (OUTPATIENT)
Dept: SCHEDULING | Facility: IMAGING CENTER | Age: 61
End: 2021-05-01

## 2021-05-01 NOTE — PROGRESS NOTES
Attempt #1    Outreach for COVID vaccine    Outreach Outcome LVM; rescheduled 5/21    Transfer to [address] [place] [address] if patient calls back to schedule appointment.

## 2021-05-21 ENCOUNTER — IMMUNIZATION (OUTPATIENT)
Dept: FAMILY PLANNING/WOMEN'S HEALTH CLINIC | Facility: IMMUNIZATION CENTER | Age: 61
End: 2021-05-21
Payer: COMMERCIAL

## 2021-05-21 DIAGNOSIS — Z23 ENCOUNTER FOR VACCINATION: Primary | ICD-10-CM

## 2021-05-21 PROCEDURE — 91300 PFIZER SARS-COV-2 VACCINE: CPT | Performed by: INTERNAL MEDICINE

## 2021-05-21 PROCEDURE — 0002A PFIZER SARS-COV-2 VACCINE: CPT | Performed by: INTERNAL MEDICINE

## 2021-06-11 ENCOUNTER — TELEPHONE (OUTPATIENT)
Dept: MEDICAL GROUP | Facility: MEDICAL CENTER | Age: 61
End: 2021-06-11

## 2021-06-11 DIAGNOSIS — G47.30 SLEEP APNEA, UNSPECIFIED TYPE: ICD-10-CM

## 2021-06-12 NOTE — TELEPHONE ENCOUNTER
Please call the patient, I did receive a request for the patient to have new CPAP equipment from [facility], I did fill out the form but she needs to see the sleep apnea specialist to update her settings. I am placing the referral for her to see our sleep apnea group.

## 2021-06-14 ENCOUNTER — TELEPHONE (OUTPATIENT)
Dept: MEDICAL GROUP | Facility: MEDICAL CENTER | Age: 61
End: 2021-06-14

## 2021-06-14 DIAGNOSIS — Z00.00 PREVENTATIVE HEALTH CARE: ICD-10-CM

## 2021-06-14 DIAGNOSIS — E55.9 VITAMIN D DEFICIENCY: ICD-10-CM

## 2021-06-14 NOTE — TELEPHONE ENCOUNTER
Ramona Radhika [facility]  [phone]    Pt has self tested with the strips you get at the pharmacy. She is very uncomfortable and is unable to leave her father to get seen. Her Leucocytes and Nitrites came out positive. Please advise. I told her that she needed to be seen but that is not an option right now.

## 2021-06-14 NOTE — TELEPHONE ENCOUNTER
Ramona Garrido [facility]  [phone] (home)       Pt scheduled her annual while I was on the phone with her on another message. Would like you to order labs for that annual.

## 2021-06-14 NOTE — TELEPHONE ENCOUNTER
Fasting blood and urine test have been ordered-she can have those done and we can discuss the results at her appointment.

## 2021-06-14 NOTE — TELEPHONE ENCOUNTER
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 Please send the REF and she will try to find coverage.

## 2021-06-14 NOTE — TELEPHONE ENCOUNTER
I can call in a short course of antibiotics for her but if she is in [place] with her father, I need to know which pharmacy goes to and please put that in epic

## 2021-06-15 RX ORDER — SULFAMETHOXAZOLE AND TRIMETHOPRIM 800; 160 MG/1; MG/1
1 TABLET ORAL 2 TIMES DAILY
Qty: 6 TABLET | Refills: 0 | Status: SHIPPED | OUTPATIENT
Start: 2021-06-15 | End: 2021-08-02

## 2021-06-15 NOTE — TELEPHONE ENCOUNTER
Noted.  Please call the patient and let her know that I will send a prescription for Bactrim (an antibiotic she has had previously for urine infections) to take 1 pill twice a day for 3 days.  This will be sent to the pharmacy [facility] in [place].    Have her complete the course of the antibiotics, and should she still has symptoms she would then need to see someone in [facility] for further evaluation.

## 2021-07-27 ENCOUNTER — HOSPITAL ENCOUNTER (OUTPATIENT)
Dept: LAB | Facility: MEDICAL CENTER | Age: 61
End: 2021-07-27
Attending: INTERNAL MEDICINE
Payer: COMMERCIAL

## 2021-07-27 DIAGNOSIS — E55.9 VITAMIN D DEFICIENCY: ICD-10-CM

## 2021-07-27 DIAGNOSIS — Z00.00 PREVENTATIVE HEALTH CARE: ICD-10-CM

## 2021-07-27 LAB
25(OH)D3 SERPL-MCNC: 43 NG/ML (ref 30–100)
ALBUMIN SERPL BCP-MCNC: 4.4 G/DL (ref 3.2–4.9)
ALBUMIN/GLOB SERPL: 1.8 G/DL
ALP SERPL-CCNC: 55 U/L (ref 30–99)
ALT SERPL-CCNC: 68 U/L (ref 2–50)
ANION GAP SERPL CALC-SCNC: 13 MMOL/L (ref 7–16)
APPEARANCE UR: CLEAR
AST SERPL-CCNC: 53 U/L (ref 12–45)
BILIRUB SERPL-MCNC: 0.4 MG/DL (ref 0.1–1.5)
BILIRUB UR QL STRIP.AUTO: NEGATIVE
BUN SERPL-MCNC: 14 MG/DL (ref 8–22)
CALCIUM SERPL-MCNC: 9.3 MG/DL (ref 8.5–10.5)
CHLORIDE SERPL-SCNC: 102 MMOL/L (ref 96–112)
CHOLEST SERPL-MCNC: 163 MG/DL (ref 100–199)
CO2 SERPL-SCNC: 25 MMOL/L (ref 20–33)
COLOR UR: YELLOW
CREAT SERPL-MCNC: 0.77 MG/DL (ref 0.5–1.4)
FASTING STATUS PATIENT QL REPORTED: NORMAL
GLOBULIN SER CALC-MCNC: 2.5 G/DL (ref 1.9–3.5)
GLUCOSE SERPL-MCNC: 91 MG/DL (ref 65–99)
GLUCOSE UR STRIP.AUTO-MCNC: NEGATIVE MG/DL
HDLC SERPL-MCNC: 50 MG/DL
KETONES UR STRIP.AUTO-MCNC: NEGATIVE MG/DL
LDLC SERPL CALC-MCNC: 76 MG/DL
LEUKOCYTE ESTERASE UR QL STRIP.AUTO: NEGATIVE
MICRO URNS: NORMAL
NITRITE UR QL STRIP.AUTO: NEGATIVE
PH UR STRIP.AUTO: 6.5 [PH] (ref 5–8)
POTASSIUM SERPL-SCNC: 4.2 MMOL/L (ref 3.6–5.5)
PROT SERPL-MCNC: 6.9 G/DL (ref 6–8.2)
PROT UR QL STRIP: NEGATIVE MG/DL
RBC UR QL AUTO: NEGATIVE
SODIUM SERPL-SCNC: 140 MMOL/L (ref 135–145)
SP GR UR STRIP.AUTO: 1.01
TRIGL SERPL-MCNC: 185 MG/DL (ref 0–149)
TSH SERPL DL<=0.005 MIU/L-ACNC: 1.67 UIU/ML (ref 0.38–5.33)
UROBILINOGEN UR STRIP.AUTO-MCNC: 0.2 MG/DL

## 2021-07-27 PROCEDURE — 83036 HEMOGLOBIN GLYCOSYLATED A1C: CPT

## 2021-07-27 PROCEDURE — 81003 URINALYSIS AUTO W/O SCOPE: CPT

## 2021-07-27 PROCEDURE — 80061 LIPID PANEL: CPT

## 2021-07-27 PROCEDURE — 85025 COMPLETE CBC W/AUTO DIFF WBC: CPT

## 2021-07-27 PROCEDURE — 80053 COMPREHEN METABOLIC PANEL: CPT

## 2021-07-27 PROCEDURE — 84443 ASSAY THYROID STIM HORMONE: CPT

## 2021-07-27 PROCEDURE — 36415 COLL VENOUS BLD VENIPUNCTURE: CPT

## 2021-07-27 PROCEDURE — 82306 VITAMIN D 25 HYDROXY: CPT

## 2021-07-28 LAB
BASOPHILS # BLD AUTO: 0.7 % (ref 0–1.8)
BASOPHILS # BLD: 0.04 K/UL (ref 0–0.12)
EOSINOPHIL # BLD AUTO: 0.09 K/UL (ref 0–0.51)
EOSINOPHIL NFR BLD: 1.5 % (ref 0–6.9)
ERYTHROCYTE [DISTWIDTH] IN BLOOD BY AUTOMATED COUNT: 45.7 FL (ref 35.9–50)
EST. AVERAGE GLUCOSE BLD GHB EST-MCNC: 111 MG/DL
HBA1C MFR BLD: 5.5 % (ref 4–5.6)
HCT VFR BLD AUTO: 47 % (ref 37–47)
HGB BLD-MCNC: 15.1 G/DL (ref 12–16)
IMM GRANULOCYTES # BLD AUTO: 0.02 K/UL (ref 0–0.11)
IMM GRANULOCYTES NFR BLD AUTO: 0.3 % (ref 0–0.9)
LYMPHOCYTES # BLD AUTO: 2.35 K/UL (ref 1–4.8)
LYMPHOCYTES NFR BLD: 39.6 % (ref 22–41)
MCH RBC QN AUTO: 30.6 PG (ref 27–33)
MCHC RBC AUTO-ENTMCNC: 32.1 G/DL (ref 33.6–35)
MCV RBC AUTO: 95.1 FL (ref 81.4–97.8)
MONOCYTES # BLD AUTO: 0.54 K/UL (ref 0–0.85)
MONOCYTES NFR BLD AUTO: 9.1 % (ref 0–13.4)
NEUTROPHILS # BLD AUTO: 2.9 K/UL (ref 2–7.15)
NEUTROPHILS NFR BLD: 48.8 % (ref 44–72)
NRBC # BLD AUTO: 0 K/UL
NRBC BLD-RTO: 0 /100 WBC
PLATELET # BLD AUTO: 209 K/UL (ref 164–446)
PMV BLD AUTO: 12.1 FL (ref 9–12.9)
RBC # BLD AUTO: 4.94 M/UL (ref 4.2–5.4)
WBC # BLD AUTO: 5.9 K/UL (ref 4.8–10.8)

## 2021-08-02 ENCOUNTER — OFFICE VISIT (OUTPATIENT)
Dept: MEDICAL GROUP | Facility: MEDICAL CENTER | Age: 61
End: 2021-08-02
Payer: COMMERCIAL

## 2021-08-02 ENCOUNTER — TELEPHONE (OUTPATIENT)
Dept: MEDICAL GROUP | Facility: MEDICAL CENTER | Age: 61
End: 2021-08-02

## 2021-08-02 VITALS
BODY MASS INDEX: 33.8 KG/M2 | SYSTOLIC BLOOD PRESSURE: 124 MMHG | OXYGEN SATURATION: 97 % | TEMPERATURE: 98.4 F | DIASTOLIC BLOOD PRESSURE: 86 MMHG | HEIGHT: 64 IN | WEIGHT: 198 LBS | HEART RATE: 72 BPM

## 2021-08-02 DIAGNOSIS — Z12.11 COLON CANCER SCREENING: ICD-10-CM

## 2021-08-02 DIAGNOSIS — Z13.820 OSTEOPOROSIS SCREENING: ICD-10-CM

## 2021-08-02 DIAGNOSIS — R94.5 ABNORMAL LIVER FUNCTION: ICD-10-CM

## 2021-08-02 DIAGNOSIS — E66.9 CLASS 1 OBESITY WITHOUT SERIOUS COMORBIDITY WITH BODY MASS INDEX (BMI) OF 34.0 TO 34.9 IN ADULT, UNSPECIFIED OBESITY TYPE: ICD-10-CM

## 2021-08-02 DIAGNOSIS — M81.0 SENILE OSTEOPOROSIS: ICD-10-CM

## 2021-08-02 PROCEDURE — 99214 OFFICE O/P EST MOD 30 MIN: CPT | Performed by: INTERNAL MEDICINE

## 2021-08-02 RX ORDER — FLUOXETINE HYDROCHLORIDE 40 MG/1
60 CAPSULE ORAL DAILY
Qty: 30 CAPSULE | Refills: 1
Start: 2021-08-02 | End: 2023-03-02

## 2021-08-02 RX ORDER — PHENTERMINE HYDROCHLORIDE 15 MG/1
15 CAPSULE ORAL EVERY MORNING
Qty: 30 CAPSULE | Refills: 2 | Status: SHIPPED | OUTPATIENT
Start: 2021-08-02 | End: 2022-03-29 | Stop reason: SDUPTHER

## 2021-08-02 ASSESSMENT — FIBROSIS 4 INDEX: FIB4 SCORE: 1.85

## 2021-08-02 NOTE — PROGRESS NOTES
Subjective:      Ramona Wilson is a 60 y.o. female who presents with            HPI     Here for follow up, goes to [place] and takes care of her father for weeks at a time, also has one autistic son here, does have more stress taking care of father as well as brother who has heart issues as well. She does stress eat and has gained weight. Has not been exercising but keeps active caring for her father. Depression on prozac 60 mg per  psychiatry, seizure every 3 months, stable mood. Does have right knee pain and has seen [facility] for this and has had an xray and tried PT.  No sodas, no juices   No tobacco, no etoh  FH father lives in [place], with chronic lung problems, one brother has heart disease  Sleep apnea CPAP using nightly, follow-up with pulmonary December          Current Outpatient Medications   Medication Sig Dispense Refill   • sulfamethoxazole-trimethoprim (BACTRIM DS) 800-160 MG tablet Take 1 tablet by mouth 2 times a day. 6 tablet 0   • clobetasol (TEMOVATE) 0.05 % external solution APPLY 1 APPLICATION TO AFFECTED AREA(S) 2 TIMES A DAY. 50 mL 5   • tobramycin-dexamethasone (TOBRADEX) 0.3-0.1 % Suspension PLACE 1 DROP INTO BOTH EYES 4 TIMES A DAY     • fluoxetine (PROZAC) 40 MG capsule Take 40 mg by mouth every day.     • fluvoxAMINE (LUVOX) 50 MG Tab Take 50 mg by mouth every evening.     • Omega-3 Fatty Acids (FISH OIL) 1000 MG Cap capsule Take 1,000 mg by mouth 3 times a day, with meals.     • Cholecalciferol (VITAMIN D) 1000 UNIT CAPS Take  by mouth every day.       No current facility-administered medications for this visit.     uti  12/24/19 UTI enterobacter resistant to ampicillin, cefazolin, intermediate nitrofurantoin, sensitive to bactrim, fluoroquinolones given macrodantin initially, changed to bactrim subsequently  2/5/20  urology note urinalysis negative, postvoid residual 51 mL by bladder scan  2/12/20 urology note ultrasound renal left kidney 1.7 cm cyst, no  hydronephrosis, no masses, no post void residual, no bladder thickening or mass  3/3/20 c.difficile positive stool treated with oral vancomycin  7/2/20 UTI e.coli sensitive to all antibiotics, will use bactrim  7/16/20 Sukumar e.coli resistant to bactrim, will use short course of macrobid 5 days  7/28/21 UA negative    Status post cholecystectomy     Preventative health  5/21/12 colon per [facility] polyp pathology inflammatory, repeat in 10 years  9/30/15 tdap  12/15/15 pap negative per gyn  3/7/18 dexa LS -1.2,hip 0.0  10/26/18 [facility]  8/6/20 mammogram screening, abnormal density right breast posterior midline recommend diagnostic mammogram and ultrasound, ordered  8/10/20 mammogram diagnostic right and ultrasound right, option per radiology 6-month follow-up with possible MRI versus ultrasound-guided needle biopsy, patient prefers biopsy  8/12/20 ultrasound-guided right breast biopsy pathology benign breast tissue  7/28/21 vit d 43  5/21/21 covid pfizer second     Left heel spur     Impaired glucose metabolism  12/6/16 A1c 5.8%,bs 86  2/16/18 A1c 5.6%  4/26/19 A1c 5.6%  7/28/21 A1c 5.5%     history sleep apnea  10/15/10 positive apnea link formal eval pending   9/3/15 [facility] sleep note long history of snoring, daytime somnolence, will be set up for polysomnogram evaluate for sleep apnea.  11/30/15 sleep study, CPAP was adjusted between 4 and 8, recommend bilevel  11/30/15 sleep study 2-27 minutes total, AHI 0.4, hypopnea index 26, supine AHI 27, mean saturation 93%, minimum saturation 86%, saturations below 89% for 0.9% of sleep time, cpap 4-8 AHI 21.7, mean saturation 93% with minimum saturation 87%  12/18/15 [facility] sleep note repeat cpap titration  2/17/16 [facility] sleep note, start autocpap 9-18, cnox after acclimated  2/15/18 off cpap  6/11/21 order to [facility] to replace cpap equipment, patient needs to see sleep group, referral placed     history low back pain  5/07 MRI lumbar spine L5-S1 small disc protrusion,  L4-L5 broad-based disc   8/25/16  pain note left sacroiliac joint dysfunction, provided left sacroiliac steroidal joint injection under fluoroscopy  9/27/16  pain management procedure note, left L5 dorsal ramus and left S1-S3 nerve radiofrequency ablation under fluoroscopy  10/18/16  pain note s/p left L5S3 FJNA on 9/27/16 80% improvement in symptoms, recommend pennsaid 2% topical, stop naproxen, trial physical therapy, consider ganglion nerve block  9/30/15 xray sacrum and coccyx slight posterior subluxation of the second coccygeal segment which may represent sequela of old injury,no acute sacral or coccygeal fracture,mild degenerative changes of each SI joint  9/30/15 referral to physical therapy     history elbow fracture  1/21/15 x-ray left elbow radial head fracture  3/19/15  orthopedic note, x-ray elbow no displacement, healed left radial head fracture    History C. 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 Would need referral ENT     Dyslipidemia  4/09 chol 150,trig, 258, hdl 30, ldl 68  10/10 chol 149,trig 256,hdl 38,ldl 60 start fish oil 2 bid  1/9/14 chol 179,trig 186,hdl 42,ldl 100  9/11/15 chol 175,trig 185,hdl 54,ldl 84  2/16/18 chol 150,trig 158,hdl 49,ldl 75  4/26/19 chol 166,trig 279,hdl 44,ldl 66  7/28/21 chol 163,trig 185,hdl 50,ldl 76 history      Depression  On effexor since 2006, failed celexa  10/10 increase effexor xr to 150 mg and add  ativan 0.5 mg prn   10/7/13 continue effexor  mg, add wellbutrin 150 mg daily, PHQ score 17 declined psychotherapy  2/27/17 on effexor 150  mg and wellbutrin 150 mg bid  3/13/17 insurance not cover wellbutrin  mg bid, change to 300 mg qday  11/20/17 referral  at [place] [facility]  2/15/18 PHQ 8 sees  psychiatry on wellbutrin 300 mg, zoloft and adderall 5 mg bid   4/4/19 sees  psychiatry on wellbutrin 300 mg  5/23/19 started on luvox  psychiatry and wellbutrin      Cyst ovary  11/23/15 ultrasound gynecologic, prominent endometrial stripe, 3.3 cm right ovarian cyst     Abnormal liver function  1/9/14 AST 76,,alk 70,bili 0.6  5/9/14 AST 31,ALT 49,GGT 11,iron panel normal, hepatitis panel negative,SHIMON negative  9/11/15 AST 20,ALT 36,alk phos 68,bili 0.4,GGT 13, iron 102,%sat 25,AMA neg,actin IgG negative,SPEP neg  12/6/16 AST 38,ALT 59 done at [facility]  1/16/18 AST 30,ALT 43 done at [facility]  4/26/19 AST 27,ALT 39  7/28/21 AST 53,ALT 68         Patient Active Problem List   Diagnosis   • History of low back pain   • Dyslipidemia   • Depression   • Preventative health care   • History of sleep apnea   • S/P cholecystectomy   • Pain of left heel   • Eagle's syndrome   • History of elbow fracture, left   • Abnormal liver function   • Obesity   • Cyst of ovary, right   • Impaired fasting glucose   • History of colon polyps   • UTI (urinary tract infection)   • History of c.difficile colitis            Health Maintenance Summary                HEPATITIS C SCREENING Overdue 1960     IMM ZOSTER VACCINES Overdue 12/25/2010     PAP SMEAR Overdue 12/15/2018      Done 12/15/2015 PAP SENT TO [email or web address]     Patient has more history with this topic...    MAMMOGRAM Next Due 8/6/2021      Done 8/6/2020 MA-SCREENING MAMMO BILAT W/TOMOSYNTHESIS W/CAD     Patient has more history with this topic...    IMM INFLUENZA Next Due 9/1/2021      Done 10/18/2019 Imm Admin:  "Influenza Vaccine Quad Inj (Pf)     Patient has more history with this topic...    COLONOSCOPY Next Due 5/21/2022      Done 5/21/2012 AMB REFERRAL TO GI FOR COLONOSCOPY    IMM DTaP/Tdap/Td Vaccine Next Due 9/30/2025      Done 9/30/2015 Imm Admin: Tdap Vaccine          Patient Care Team:  Jordan Rand M.D. as PCP - General    ROS       Objective:     /86   Pulse 72   Temp 36.9 °C (98.4 °F) (Temporal)   Ht 1.626 m (5' 4\")   Wt 89.8 kg (198 lb)   LMP 10/10/2010   SpO2 97%   BMI 33.99 kg/m²      Physical Exam  Vitals and nursing note reviewed.   Constitutional:       Appearance: Normal appearance.   HENT:      Head: Normocephalic and atraumatic.      Right Ear: External ear normal.      Left Ear: External ear normal.   Eyes:      Conjunctiva/sclera: Conjunctivae normal.   Cardiovascular:      Rate and Rhythm: Normal rate and regular rhythm.   Pulmonary:      Effort: Pulmonary effort is normal.      Breath sounds: Normal breath sounds.   Abdominal:      General: There is no distension.   Musculoskeletal:         General: No swelling.      Cervical back: Neck supple.   Skin:     General: Skin is warm.   Neurological:      General: No focal deficit present.      Mental Status: She is alert.   Psychiatric:         Mood and Affect: Mood normal.         Behavior: Behavior normal.     Right knee no crepitus, no tenderness on exam, no effusion or edema, normal flexion extension  Assessment/Plan:        Assessment  #! Depression on prozac 60 mg per  psychiatry stable    #2 Abnormal liver enzymes 7/28/21 AST 53,ALT 68 no alcohol, likely hepatic steatosis related to diet and weight    #3 sleep apnea on CPAP    #4 dyslipidemia diet controlled 7/28/21 chol 163,trig 185,hdl 50,ldl 76    #5 BMI 33.9     #6 right knee discomfort, typically with activity, did see orthopedics a few years ago, had an x-ray and physical therapy        Plan  #! Old records [facility] regarding the pain in her knee, will need to review " her records, consider follow-up orthopedics for injections depending on x-ray results and evaluation    #2 sleep referral pending for follow up in december, continue CPAP    #3 start exercising on a stationary bike     #4 mammogram due already ordered and dexa after august 11     #5 cologuard    #6 ultrasound liver and follow up liver enzymes     #7 follow up  psychiatry and continue prozac     #8 old records  gynecology office     #9 shingirx vaccine at pharmacy    #10 reviewed labs from 7/28/21, repeat labs ordered for 3 months follow-up liver enzyme elevation, continue no alcohol    #11 trial of phentermine 15 mg once a day in the morning, can increase blood pressure, cause palpitations, she is to work on limiting her portion sizes, processed food intake, avoiding sweets, candies, and start exercise regularly using stationary bike, as obesity can increase risk of diabetes and heart disease    #12 follow-up 3 months

## 2021-08-09 PROBLEM — M25.519 SHOULDER PAIN: Status: ACTIVE | Noted: 2021-08-09

## 2021-08-27 ENCOUNTER — HOSPITAL ENCOUNTER (OUTPATIENT)
Dept: RADIOLOGY | Facility: MEDICAL CENTER | Age: 61
End: 2021-08-27
Attending: INTERNAL MEDICINE
Payer: COMMERCIAL

## 2021-08-27 ENCOUNTER — TELEPHONE (OUTPATIENT)
Dept: MEDICAL GROUP | Facility: MEDICAL CENTER | Age: 61
End: 2021-08-27

## 2021-08-27 DIAGNOSIS — R94.5 ABNORMAL LIVER FUNCTION: ICD-10-CM

## 2021-08-27 PROCEDURE — 76705 ECHO EXAM OF ABDOMEN: CPT

## 2021-08-27 RX ORDER — HYDROXYZINE HYDROCHLORIDE 10 MG/1
TABLET, FILM COATED ORAL
COMMUNITY
Start: 2021-08-11 | End: 2023-11-21

## 2021-08-28 NOTE — TELEPHONE ENCOUNTER
Notified of the ultrasound result, hepatic steatosis/fatty liver, will repeat liver enzyme work-up in 2 months, labs are already in the computer system.  Try to work on her nutrition, limiting processed foods, sweets, as well as limiting carbohydrate portion serving sizes.  On her most recent labs was no evidence of diabetes.

## 2021-09-03 ENCOUNTER — HOSPITAL ENCOUNTER (OUTPATIENT)
Dept: RADIOLOGY | Facility: MEDICAL CENTER | Age: 61
End: 2021-09-03
Attending: INTERNAL MEDICINE
Payer: COMMERCIAL

## 2021-09-03 ENCOUNTER — TELEPHONE (OUTPATIENT)
Dept: MEDICAL GROUP | Facility: MEDICAL CENTER | Age: 61
End: 2021-09-03

## 2021-09-03 DIAGNOSIS — Z12.31 ENCOUNTER FOR SCREENING MAMMOGRAM FOR BREAST CANCER: ICD-10-CM

## 2021-09-03 DIAGNOSIS — M81.0 SENILE OSTEOPOROSIS: ICD-10-CM

## 2021-09-03 DIAGNOSIS — R92.8 ABNORMAL FINDING ON BREAST IMAGING: ICD-10-CM

## 2021-09-03 PROCEDURE — 77080 DXA BONE DENSITY AXIAL: CPT

## 2021-09-03 PROCEDURE — G0279 TOMOSYNTHESIS, MAMMO: HCPCS

## 2021-09-03 RX ORDER — FLUOXETINE HYDROCHLORIDE 20 MG/1
CAPSULE ORAL
COMMUNITY
Start: 2021-08-27 | End: 2021-12-06

## 2021-09-04 NOTE — TELEPHONE ENCOUNTER
Please notify the patient that her bone density test shows:  (1) normal bone strength of her hip  (2) there is a slight decrease of the bone strength of her back, this is not osteoporosis, and no medications are necessary.  Woman and men normally will lose bone strength over the years but her bone strength is really close to normal and just minimally decreased in her back  (3) have her continue vitamin D supplementation, take calcium 1000 mg total per day, and work on regular weightbearing exercise  (4) we can repeat her bone density test in 2 years

## 2021-09-07 ENCOUNTER — TELEPHONE (OUTPATIENT)
Dept: MEDICAL GROUP | Facility: MEDICAL CENTER | Age: 61
End: 2021-09-07

## 2021-09-07 NOTE — TELEPHONE ENCOUNTER
----- Message from Jordan Rand M.D. sent at 9/3/2021  6:14 PM PDT -----  Please notify the patient that her bone density test shows:  (1) normal bone strength of her hip  (2) there is a slight decrease of the bone strength of her back, this is not osteoporosis, and no medications are necessary.  Woman and men normally will lose bone strength over the years but her bone strength is really close to normal and just minimally decreased in her back  (3) have her continue vitamin D supplementation, take calcium 1000 mg total per day, and work on regular weightbearing exercise  (4) we can repeat her bone density test in 2 years

## 2021-09-17 ENCOUNTER — TELEPHONE (OUTPATIENT)
Dept: MEDICAL GROUP | Facility: MEDICAL CENTER | Age: 61
End: 2021-09-17

## 2021-09-17 NOTE — TELEPHONE ENCOUNTER
1. Caller Name: Ramona Wilson                     Call Back Number: [phone]      How would the patient prefer to be contacted with a response: Phone call OK to leave a detailed message    Patient is stating that after her Mammo she developed bruising and a lump, she wanted to know if this was normal and would like to talk to you or maybe schedule a virtual visit with you to discuss. She is in [place] though for some time, would you like me to schedule a virtual or wait until patient is back in town for an in person visit?

## 2021-09-17 NOTE — TELEPHONE ENCOUNTER
Please notify the patient if this developed right after the mammogram it is likely related to the procedure itself.  It should start to resolve but may take 1 to 2 weeks.      If the lump persists she should let us know. She does not need to schedule a virtual appointment for that as virtual appointment are not helpful for breast lumps, as that requires a physical exam to evaluate.

## 2021-10-04 ENCOUNTER — TELEPHONE (OUTPATIENT)
Dept: MEDICAL GROUP | Facility: MEDICAL CENTER | Age: 61
End: 2021-10-04

## 2021-10-04 DIAGNOSIS — N63.20 LEFT BREAST MASS: ICD-10-CM

## 2021-10-06 ENCOUNTER — NON-PROVIDER VISIT (OUTPATIENT)
Dept: MEDICAL GROUP | Facility: MEDICAL CENTER | Age: 61
End: 2021-10-06
Payer: COMMERCIAL

## 2021-10-06 DIAGNOSIS — Z23 NEED FOR VACCINATION: ICD-10-CM

## 2021-10-06 PROCEDURE — 90471 IMMUNIZATION ADMIN: CPT | Performed by: INTERNAL MEDICINE

## 2021-10-06 PROCEDURE — 90686 IIV4 VACC NO PRSV 0.5 ML IM: CPT | Performed by: INTERNAL MEDICINE

## 2021-10-06 NOTE — PROGRESS NOTES
"Ramona Wilson is a 60 y.o. female here for a non-provider visit for:   FLU    Reason for immunization: Annual Flu Vaccine  Immunization records indicate need for vaccine: Yes, confirmed with Epic  Minimum interval has been met for this vaccine: Yes  ABN completed: No    VIS Dated  8/6/21 was given to patient: Yes  All IAC Questionnaire questions were answered \"No.\"    Patient tolerated injection and no adverse effects were observed or reported: Yes    Pt scheduled for next dose in series: No  "

## 2021-10-13 ENCOUNTER — TELEPHONE (OUTPATIENT)
Dept: MEDICAL GROUP | Facility: MEDICAL CENTER | Age: 61
End: 2021-10-13

## 2021-10-13 ENCOUNTER — HOSPITAL ENCOUNTER (OUTPATIENT)
Dept: RADIOLOGY | Facility: MEDICAL CENTER | Age: 61
End: 2021-10-13
Attending: INTERNAL MEDICINE
Payer: COMMERCIAL

## 2021-10-13 DIAGNOSIS — N63.20 LEFT BREAST MASS: ICD-10-CM

## 2021-10-13 DIAGNOSIS — R92.8 ABNORMAL MAGNETIC RESONANCE IMAGING OF LEFT BREAST: ICD-10-CM

## 2021-10-13 PROCEDURE — 76642 ULTRASOUND BREAST LIMITED: CPT | Mod: LT

## 2021-10-13 NOTE — TELEPHONE ENCOUNTER
Please notify patient that the ultrasound showed a likely hematoma or bruise, the radiologist recommends repeating this in 3 months to document resolution.  I will place the order in the computer.

## 2021-10-14 ENCOUNTER — TELEPHONE (OUTPATIENT)
Dept: MEDICAL GROUP | Facility: MEDICAL CENTER | Age: 61
End: 2021-10-14

## 2021-10-14 NOTE — TELEPHONE ENCOUNTER
----- Message from Jordan Rand M.D. sent at 10/13/2021 10:21 AM PDT -----  Please notify the patient that the ultrasound of her left breast shows a likely hematoma or bruise.  The radiologist suggests repeating a left breast ultrasound in 3 months to make sure this is resolving.  I will place the order in the computer system to repeat the left breast ultrasound in 3 months.

## 2021-11-18 ENCOUNTER — TELEPHONE (OUTPATIENT)
Dept: MEDICAL GROUP | Facility: MEDICAL CENTER | Age: 61
End: 2021-11-18

## 2021-11-18 DIAGNOSIS — L40.9 PSORIASIS: ICD-10-CM

## 2021-12-03 ENCOUNTER — HOSPITAL ENCOUNTER (OUTPATIENT)
Dept: LAB | Facility: MEDICAL CENTER | Age: 61
End: 2021-12-03
Attending: INTERNAL MEDICINE
Payer: COMMERCIAL

## 2021-12-03 DIAGNOSIS — R94.5 ABNORMAL LIVER FUNCTION: ICD-10-CM

## 2021-12-03 LAB
ALBUMIN SERPL BCP-MCNC: 4.4 G/DL (ref 3.2–4.9)
ALP SERPL-CCNC: 63 U/L (ref 30–99)
ALT SERPL-CCNC: 45 U/L (ref 2–50)
AST SERPL-CCNC: 31 U/L (ref 12–45)
BILIRUB CONJ SERPL-MCNC: <0.2 MG/DL (ref 0.1–0.5)
BILIRUB INDIRECT SERPL-MCNC: NORMAL MG/DL (ref 0–1)
BILIRUB SERPL-MCNC: 0.3 MG/DL (ref 0.1–1.5)
IRON SATN MFR SERPL: 23 % (ref 15–55)
IRON SERPL-MCNC: 86 UG/DL (ref 40–170)
PROT SERPL-MCNC: 7.4 G/DL (ref 6–8.2)
TIBC SERPL-MCNC: 375 UG/DL (ref 250–450)
UIBC SERPL-MCNC: 289 UG/DL (ref 110–370)

## 2021-12-03 PROCEDURE — 83540 ASSAY OF IRON: CPT

## 2021-12-03 PROCEDURE — 82728 ASSAY OF FERRITIN: CPT

## 2021-12-03 PROCEDURE — 80076 HEPATIC FUNCTION PANEL: CPT

## 2021-12-03 PROCEDURE — 86038 ANTINUCLEAR ANTIBODIES: CPT

## 2021-12-03 PROCEDURE — 84155 ASSAY OF PROTEIN SERUM: CPT

## 2021-12-03 PROCEDURE — 80074 ACUTE HEPATITIS PANEL: CPT

## 2021-12-03 PROCEDURE — 83550 IRON BINDING TEST: CPT

## 2021-12-03 PROCEDURE — 36415 COLL VENOUS BLD VENIPUNCTURE: CPT

## 2021-12-03 PROCEDURE — 84165 PROTEIN E-PHORESIS SERUM: CPT

## 2021-12-04 LAB
FERRITIN SERPL-MCNC: 139 NG/ML (ref 10–291)
HAV IGM SERPL QL IA: NORMAL
HBV CORE IGM SER QL: NORMAL
HBV SURFACE AG SER QL: NORMAL
HCV AB SER QL: NORMAL

## 2021-12-06 ENCOUNTER — OFFICE VISIT (OUTPATIENT)
Dept: MEDICAL GROUP | Facility: MEDICAL CENTER | Age: 61
End: 2021-12-06
Payer: COMMERCIAL

## 2021-12-06 VITALS
SYSTOLIC BLOOD PRESSURE: 140 MMHG | DIASTOLIC BLOOD PRESSURE: 78 MMHG | BODY MASS INDEX: 33.46 KG/M2 | WEIGHT: 196 LBS | TEMPERATURE: 97.8 F | HEIGHT: 64 IN | HEART RATE: 87 BPM | OXYGEN SATURATION: 92 %

## 2021-12-06 DIAGNOSIS — F32.A DEPRESSION, UNSPECIFIED DEPRESSION TYPE: Chronic | ICD-10-CM

## 2021-12-06 DIAGNOSIS — E66.9 CLASS 1 OBESITY WITHOUT SERIOUS COMORBIDITY WITH BODY MASS INDEX (BMI) OF 34.0 TO 34.9 IN ADULT, UNSPECIFIED OBESITY TYPE: ICD-10-CM

## 2021-12-06 DIAGNOSIS — R94.5 ABNORMAL LIVER FUNCTION: ICD-10-CM

## 2021-12-06 DIAGNOSIS — Z00.00 PREVENTATIVE HEALTH CARE: ICD-10-CM

## 2021-12-06 LAB — NUCLEAR IGG SER QL IA: NORMAL

## 2021-12-06 PROCEDURE — 99213 OFFICE O/P EST LOW 20 MIN: CPT | Performed by: INTERNAL MEDICINE

## 2021-12-06 RX ORDER — BUPROPION HYDROCHLORIDE 150 MG/1
TABLET, EXTENDED RELEASE ORAL
COMMUNITY
Start: 2021-10-20 | End: 2023-03-16

## 2021-12-06 RX ORDER — PHENTERMINE HYDROCHLORIDE 15 MG/1
CAPSULE ORAL
COMMUNITY
Start: 2021-10-25 | End: 2022-11-23

## 2021-12-06 RX ORDER — FLUOXETINE HYDROCHLORIDE 20 MG/1
20 CAPSULE ORAL DAILY
Qty: 30 CAPSULE | Refills: 5
Start: 2021-12-06

## 2021-12-06 RX ORDER — BUPROPION HYDROCHLORIDE 150 MG/1
150 TABLET ORAL EVERY MORNING
Qty: 30 TABLET | Refills: 11
Start: 2021-12-06 | End: 2022-03-29

## 2021-12-06 ASSESSMENT — FIBROSIS 4 INDEX: FIB4 SCORE: 1.33

## 2021-12-06 ASSESSMENT — PATIENT HEALTH QUESTIONNAIRE - PHQ9: CLINICAL INTERPRETATION OF PHQ2 SCORE: 0

## 2021-12-06 NOTE — PROGRESS NOTES
Subjective     Ramona Wilson is a 60 y.o. female who presents with here follow up labs and phentermine  Follow-Up            HPI       Follow-up abnormal liver enzymes AST 53, ALT 68 in July, recently had imaging done 8/27/21 ultrasound right upper quadrant, increased liver echogenicity may be hepatic steatosis cirrhosis, post cholecystectomy, since then has been working on her nutrition program. Has been working on her diet, eating red meat once per month now, has been eating more plant based diet, more fish in her diet, has cut out deserts, has cut back on carbs in her diet. Has started walking more outside four times per week.  Has noticed that her diet has improved her gut health. Limits etoh to twice per year.  12/4/21 AST 31,ALT 45,iron 86,%sat 23,ferritin 139,hep panel acute negative,SHIMON negative   Also has been on phentermine 50 mg intermittently as she states she does feel somewhat shaky at times if she takes the medication too early in the morning she does also drink coffee in the morning, she is wondering if she could take this later in the day.  Weight has stabilized.  No regular alcohol.  Mood stable followed by psychiatry on Prozac and Wellbutrin has been added  Also excellently cut her left thumb area about 8 days ago carrying an object at home up-to-date on her tetanus of the area relates not painful although can itch and perhaps some discomfort with certain movements.      Current Outpatient Medications   Medication Sig Dispense Refill   • FLUoxetine (PROZAC) 20 MG Cap      • hydrOXYzine HCl (ATARAX) 10 MG Tab TAKE 1 2 TABS (10 20 MG) BY MOUTH AT BEDTIME AS NEEDED FOR INSOMNIA     • fluoxetine (PROZAC) 40 MG capsule Take 2 Capsules by mouth every day. 30 capsule 1   • clobetasol (TEMOVATE) 0.05 % external solution APPLY 1 APPLICATION TO AFFECTED AREA(S) 2 TIMES A DAY. 50 mL 5   • tobramycin-dexamethasone (TOBRADEX) 0.3-0.1 % Suspension PLACE 1 DROP INTO BOTH EYES 4 TIMES A DAY     • Omega-3  Fatty Acids (FISH OIL) 1000 MG Cap capsule Take 1,000 mg by mouth 3 times a day, with meals.     • Cholecalciferol (VITAMIN D) 1000 UNIT CAPS Take  by mouth every day.       No current facility-administered medications for this visit.        uti  12/24/19 UTI enterobacter resistant to ampicillin, cefazolin, intermediate nitrofurantoin, sensitive to bactrim, fluoroquinolones given macrodantin initially, changed to bactrim subsequently  2/5/20  urology note urinalysis negative, postvoid residual 51 mL by bladder scan  2/12/20 urology note ultrasound renal left kidney 1.7 cm cyst, no hydronephrosis, no masses, no post void residual, no bladder thickening or mass  3/3/20 c.difficile positive stool treated with oral vancomycin  7/2/20 UTI e.coli sensitive to all antibiotics, will use bactrim  7/16/20 Sukumar e.coli resistant to bactrim, will use short course of macrobid 5 days  7/28/21 UA negative     Status post cholecystectomy    sleep apnea  10/15/10 positive apnea link formal eval pending   9/3/15 [facility] sleep note long history of snoring, daytime somnolence, will be set up for polysomnogram evaluate for sleep apnea.  11/30/15 sleep study, CPAP was adjusted between 4 and 8, recommend bilevel  11/30/15 sleep study 2-27 minutes total, AHI 0.4, hypopnea index 26, supine AHI 27, mean saturation 93%, minimum saturation 86%, saturations below 89% for 0.9% of sleep time, cpap 4-8 AHI 21.7, mean saturation 93% with minimum saturation 87%  12/18/15 [facility] sleep note repeat cpap titration  2/17/16 [facility] sleep note, start autocpap 9-18, cnox after acclimated  2/15/18 off cpap  6/11/21 order to [facility] to replace cpap equipment, patient needs to see sleep group, referral placed     Preventative health  5/21/12 colon per [facility] polyp pathology inflammatory, repeat in 10 years  9/30/15 tdap  12/15/15 pap negative per gyn  10/26/18 [facility]  1/23/20  [facility] pap negative  7/28/21 vit d 43  5/21/21  covid pfizer second  9/3/21 dexa LS-1.2,hip+0.1  9/3/21 mammogram negative  10/6/21 flu      Left heel spur     Impaired glucose metabolism  12/6/16 A1c 5.8%,bs 86  2/16/18 A1c 5.6%  4/26/19 A1c 5.6%  7/28/21 A1c 5.5%     history low back pain  5/07 MRI lumbar spine L5-S1 small disc protrusion, L4-L5 broad-based disc   8/25/16  pain note left sacroiliac joint dysfunction, provided left sacroiliac steroidal joint injection under fluoroscopy  9/27/16  pain management procedure note, left L5 dorsal ramus and left S1-S3 nerve radiofrequency ablation under fluoroscopy  10/18/16  pain note s/p left L5S3 FJNA on 9/27/16 80% improvement in symptoms, recommend pennsaid 2% topical, stop naproxen, trial physical therapy, consider ganglion nerve block  9/30/15 xray sacrum and coccyx slight posterior subluxation of the second coccygeal segment which may represent sequela of old injury,no acute sacral or coccygeal fracture,mild degenerative changes of each SI joint  9/30/15 referral to physical therapy     history elbow fracture  1/21/15 x-ray left elbow radial head fracture  3/19/15  orthopedic note, x-ray elbow no displacement, healed left radial head fracture     History C. 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 Would need referral ENT     Dyslipidemia  4/09 chol 150,trig, 258, hdl 30, ldl 68  10/10 chol 149,trig 256,hdl 38,ldl 60 start fish oil 2 bid  1/9/14 chol  179,trig 186,hdl 42,ldl 100  9/11/15 chol 175,trig 185,hdl 54,ldl 84  2/16/18 chol 150,trig 158,hdl 49,ldl 75  4/26/19 chol 166,trig 279,hdl 44,ldl 66  7/28/21 chol 163,trig 185,hdl 50,ldl 76 history      Depression  On effexor since 2006, failed celexa  10/10 increase effexor xr to 150 mg and add ativan 0.5 mg prn   10/7/13 continue effexor  mg, add wellbutrin 150 mg daily, PHQ score 17 declined psychotherapy  2/27/17 on effexor 150  mg and wellbutrin 150 mg bid  3/13/17 insurance not cover wellbutrin  mg bid, change to 300 mg qday  11/20/17 referral  at [facility]  2/15/18 PHQ 8 sees  psychiatry on wellbutrin 300 mg, zoloft and adderall 5 mg bid   4/4/19 sees  psychiatry on wellbutrin 300 mg  5/23/19 started on luvox  psychiatry and wellbutrin   8/2/21 on prozac 60 mg and atarax 10 mg prn pm per  psychiatry      Cyst ovary  11/23/15 ultrasound gynecologic, prominent endometrial stripe, 3.3 cm right ovarian cyst     Abnormal liver function  1/9/14 AST 76,,alk 70,bili 0.6  5/9/14 AST 31,ALT 49,GGT 11,iron panel normal, hepatitis panel negative,SHIMON negative  9/11/15 AST 20,ALT 36,alk phos 68,bili 0.4,GGT 13, iron 102,%sat 25,AMA neg,actin IgG negative,SPEP neg  12/6/16 AST 38,ALT 59 [facility]  1/16/18 AST 30,ALT 43 [facility]  4/26/19 AST 27,ALT 39  7/28/21 AST 53,ALT 68  8/27/21 ultrasound right upper quadrant, increased liver echogenicity may be hepatic steatosis cirrhosis, post cholecystectomy  12/4/21 AST 31,ALT 45,iron 86,%sat 23,ferritin 139,hep panel acute negative,SHIMON negative            Patient Active Problem List   Diagnosis   • History of low back pain   • Dyslipidemia   • Depression   • Preventative health care   • Sleep apnea   • S/P cholecystectomy   • Pain of left heel   • Eagle's syndrome   • History of elbow fracture, left   • Abnormal liver function   • Obesity   • Cyst of ovary, right   • Impaired  fasting glucose   • History of colon polyps   • UTI (urinary tract infection)   • History of c.difficile colitis   • Shoulder pain     Depression Screening    Little interest or pleasure in doing things?  0 - not at all  Feeling down, depressed , or hopeless? 0 - not at all  Patient Health Questionnaire Score: 0            Health Maintenance Summary          Overdue - IMM ZOSTER VACCINES (1 of 2) Overdue - never done    No completion history exists for this topic.          Ordered - COLORECTAL CANCER SCREENING (COLONOSCOPY - Every 10 Years) Ordered on 8/2/2021 03/04/2020  OCCULT BLOOD,FECAL,IMMUNOASSAY    05/21/2012  AMB REFERRAL TO GI FOR COLONOSCOPY          MAMMOGRAM (Yearly) Next due on 9/3/2022    09/03/2021  MA-DIAGNOSTIC MAMMO BILAT W/TOMOSYNTHESIS W/CAD    08/10/2020  MA-DIAGNOSTIC MAMMO RIGHT W/TOMOSYNTHESIS W/O CAD    08/06/2020  MA-SCREENING MAMMO BILAT W/TOMOSYNTHESIS W/CAD    05/28/2019  MA-SCREEN MAMMO W/CAD-BILAT    03/07/2018  MA-SCREEN MAMMO W/CAD-BILAT    Only the first 5 history entries have been loaded, but more history exists.          PAP SMEAR (Every 3 Years) Next due on 1/23/2023 01/23/2020  Done -  gyn    12/15/2015  Done - PAP SENT TO [facility].    11/20/2014  Previously completed          IMM DTaP/Tdap/Td Vaccine (2 - Td or Tdap) Next due on 9/30/2025 09/30/2015  Imm Admin: Tdap Vaccine          IMM INFLUENZA (Series Information) Completed    10/06/2021  Imm Admin: Influenza Vaccine Quad Inj (Pf)    10/18/2019  Imm Admin: Influenza Vaccine Quad Inj (Pf)    01/10/2019  Imm Admin: Influenza Vaccine Quad Inj (Pf)    01/16/2018  Imm Admin: Influenza Vaccine Quad Inj (Pf)    12/06/2016  Imm Admin: Influenza Vaccine Quad Inj (Preserved)    Only the first 5 history entries have been loaded, but more history exists.          COVID-19 Vaccine (Series Information) Completed    12/03/2021  Imm Admin: Pfizer SARS-CoV-2 Vaccine 12+    05/21/2021  Imm Admin: Pfizer SARS-CoV-2 Vaccine     "04/21/2021  Imm Admin: Pfizer SARS-CoV-2 Vaccine          HEPATITIS C SCREENING  Completed    12/03/2021  HEPATITIS PANEL ACUTE(4 COMPONENTS)          IMM HEP B VACCINE (Series Information) Aged Out    No completion history exists for this topic.          IMM MENINGOCOCCAL VACCINE (MCV4) (Series Information) Aged Out    No completion history exists for this topic.          IMM PNEUMOCOCCAL VACCINE: 0-64 Years (Series Information) Aged Out    No completion history exists for this topic.                Patient Care Team:  Jordanmakenna Rand M.D. as PCP - General      ROS           Objective     /78 (BP Location: Right arm, Patient Position: Sitting, BP Cuff Size: Adult)   Pulse 87   Temp 36.6 °C (97.8 °F)   Ht 1.626 m (5' 4\")   Wt 88.9 kg (196 lb)   LMP 10/10/2010   SpO2 92%   BMI 33.64 kg/m²      Physical Exam  Vitals and nursing note reviewed.   Constitutional:       Appearance: Normal appearance.   HENT:      Head: Normocephalic and atraumatic.      Right Ear: External ear normal.      Left Ear: External ear normal.   Eyes:      Conjunctiva/sclera: Conjunctivae normal.   Cardiovascular:      Rate and Rhythm: Normal rate and regular rhythm.      Heart sounds: Normal heart sounds.   Pulmonary:      Effort: Pulmonary effort is normal.      Breath sounds: Normal breath sounds.   Abdominal:      General: There is no distension.   Musculoskeletal:      Cervical back: Neck supple.   Skin:     General: Skin is warm.   Neurological:      General: No focal deficit present.      Mental Status: She is alert.   Psychiatric:         Mood and Affect: Mood normal.       Left thumb healing superficial laceration 1.5 cm base of thumb thenar eminence, no induration, no erythema, no drainage, no tenderness to palpation, normal range of motion, no evidence of cellulitis.          Assessment & Plan        Assessment  #1 history of abnormal liver enzymes 7/28/21 AST 53,ALT 68 and imaging showed  8/27/21 ultrasound right upper " quadrant, increased liver echogenicity may be hepatic steatosis cirrhosis, post cholecystectomy, subsequent follow-up AST 31, ALT 45, iron studies negative, hepatitis panel negative, she has improved her nutrition program limiting carbohydrates and processed foods which had a positive impact on her liver enzymes, she rarely drinks alcohol    #2 BMI 33.6, takes phentermine intermittently     #3 recent left thumb base superficial laceration healing well, up-to-date on tetanus    #4 depression stable followed by psychiatry on Prozac and Wellbutrin    #5 history of abnormal breast imaging left ultrasound in October 2 complicated cystic areas resolving hematoma follow-up in 3 months recommended      plan  #1 she is up-to-date on her Covid booster    #2 reviewed labs with her normalized liver enzymes, continue limit alcohol, continue good nutrition program limiting processed foods and carbohydrates, continue exercising regularly    #3 she can try taking phentermine daily, may be  it from caffeine in the morning to limit side effects, she could try taking this later on in the morning but be careful as if she takes this too late in the day that may affect her sleep patterns and contribute to insomnia    #4 Bactroban ointment for thumb, no need for oral antibiotics    #5 continue Prozac, Wellbutrin, follow-up psychiatry    #6 reviewed labs from December 4 with her    #7 she is due for the left breast ultrasound that has been ordered previously, she can call to schedule that appointment

## 2021-12-07 LAB
ALBUMIN SERPL ELPH-MCNC: 4.05 G/DL (ref 3.75–5.01)
ALPHA1 GLOB SERPL ELPH-MCNC: 0.28 G/DL (ref 0.19–0.46)
ALPHA2 GLOB SERPL ELPH-MCNC: 1.03 G/DL (ref 0.48–1.05)
B-GLOBULIN SERPL ELPH-MCNC: 0.88 G/DL (ref 0.48–1.1)
GAMMA GLOB SERPL ELPH-MCNC: 0.96 G/DL (ref 0.62–1.51)
INTERPRETATION SERPL IFE-IMP: NORMAL
MONOCLON BAND OBS SERPL: NORMAL
PATHOLOGY STUDY: NORMAL
PROT SERPL-MCNC: 7.2 G/DL (ref 6.3–8.2)

## 2021-12-14 ENCOUNTER — APPOINTMENT (OUTPATIENT)
Dept: SLEEP MEDICINE | Facility: MEDICAL CENTER | Age: 61
End: 2021-12-14
Payer: COMMERCIAL

## 2022-01-25 ENCOUNTER — APPOINTMENT (OUTPATIENT)
Dept: RADIOLOGY | Facility: MEDICAL CENTER | Age: 62
End: 2022-01-25
Attending: INTERNAL MEDICINE
Payer: COMMERCIAL

## 2022-02-05 ENCOUNTER — TELEPHONE (OUTPATIENT)
Dept: MEDICAL GROUP | Facility: MEDICAL CENTER | Age: 62
End: 2022-02-05

## 2022-02-05 DIAGNOSIS — L40.9 PSORIASIS: ICD-10-CM

## 2022-02-05 DIAGNOSIS — L98.9 SKIN LESION: ICD-10-CM

## 2022-02-07 ENCOUNTER — HOSPITAL ENCOUNTER (OUTPATIENT)
Dept: RADIOLOGY | Facility: MEDICAL CENTER | Age: 62
End: 2022-02-07
Attending: INTERNAL MEDICINE
Payer: COMMERCIAL

## 2022-02-07 ENCOUNTER — TELEPHONE (OUTPATIENT)
Dept: MEDICAL GROUP | Facility: MEDICAL CENTER | Age: 62
End: 2022-02-07

## 2022-02-07 DIAGNOSIS — R92.8 ABNORMAL MAGNETIC RESONANCE IMAGING OF LEFT BREAST: ICD-10-CM

## 2022-02-07 PROCEDURE — 76642 ULTRASOUND BREAST LIMITED: CPT | Mod: LT

## 2022-02-07 NOTE — TELEPHONE ENCOUNTER
Please notify the patient that the left breast ultrasound shows a small simple cyst which is not concerning at all, the radiologist recommends that she can continue with annual mammography, no follow-up for the left breast cyst is needed.

## 2022-02-08 ENCOUNTER — TELEPHONE (OUTPATIENT)
Dept: MEDICAL GROUP | Facility: MEDICAL CENTER | Age: 62
End: 2022-02-08

## 2022-02-08 NOTE — TELEPHONE ENCOUNTER
----- Message from Jordan Rand M.D. sent at 2/7/2022 12:34 PM PST -----  Please notify the patient that the left breast ultrasound shows a small simple cyst which is not concerning at all, the radiologist recommends that she can continue with annual mammography, no follow-up for the left breast cyst is needed.

## 2022-02-08 NOTE — TELEPHONE ENCOUNTER
----- Message from Jodran Rand M.D. sent at 2/7/2022 12:34 PM PST -----  Please notify the patient that the left breast ultrasound shows a small simple cyst which is not concerning at all, the radiologist recommends that she can continue with annual mammography, no follow-up for the left breast cyst is needed.

## 2022-02-09 ENCOUNTER — OFFICE VISIT (OUTPATIENT)
Dept: SLEEP MEDICINE | Facility: MEDICAL CENTER | Age: 62
End: 2022-02-09
Payer: COMMERCIAL

## 2022-02-09 VITALS
DIASTOLIC BLOOD PRESSURE: 80 MMHG | HEIGHT: 64 IN | OXYGEN SATURATION: 96 % | BODY MASS INDEX: 33.29 KG/M2 | HEART RATE: 74 BPM | RESPIRATION RATE: 16 BRPM | WEIGHT: 195 LBS | SYSTOLIC BLOOD PRESSURE: 122 MMHG

## 2022-02-09 DIAGNOSIS — G47.33 OSA ON CPAP: ICD-10-CM

## 2022-02-09 PROCEDURE — 99203 OFFICE O/P NEW LOW 30 MIN: CPT | Performed by: PREVENTIVE MEDICINE

## 2022-02-09 ASSESSMENT — PATIENT HEALTH QUESTIONNAIRE - PHQ9
5. POOR APPETITE OR OVEREATING: 0 - NOT AT ALL
CLINICAL INTERPRETATION OF PHQ2 SCORE: 3
SUM OF ALL RESPONSES TO PHQ QUESTIONS 1-9: 12

## 2022-02-09 ASSESSMENT — FIBROSIS 4 INDEX: FIB4 SCORE: 1.35

## 2022-02-09 NOTE — PROGRESS NOTES
"CC: \"This is my second follow-up since I had a sleep study done in 2015.\"        HPI:   Ramona Wilson is a 61 y.o.female  kindly referred by Jordan Rand M.D.  This patient has a past medical history of obstructive sleep apnea using APAP, status post cholecystectomy, obesity, impaired fasting glucose, dyslipidemia, history of depression, Eagle syndrome, history of low back pain and history of elbow fracture left, and abnormal liver function tests.    This patient had a sleep study that was done through [facility] on November 30, 2015.  Type of study: Split-night.  AHI 26.8 and REM AHI was 45.4 in the diagnostic phase.  Diagnostic oxygen kalpana was 86.0.  During the treatment phase of the study her AHI was 21.7 and her oxygen kalpana was 87.0.  She was titrated on CPAP from 4 to 8 cm of water    COMPLIANCE DATA:  Machine type: ResMed air sense 10 AutoSet  Date range: January 10, 2022 through February 8, 2022  AHI: 5.8 (AI 5.4)  TIME USED: 7 hours and 57 minutes   PRESSURE SETTINGS: Min pressure 9, max pressure 18 cmH2O  LEAK: No leak        Patient Active Problem List    Diagnosis Date Noted   • Shoulder pain 08/09/2021   • History of c.difficile colitis 03/30/2020   • UTI (urinary tract infection) 12/26/2019   • History of colon polyps 08/06/2019   • Impaired fasting glucose 02/15/2017   • Cyst of ovary, right 11/23/2015   • Obesity 09/30/2015   • Abnormal liver function 09/17/2015   • History of elbow fracture, left 03/30/2015   • Eagle's syndrome 06/14/2014   • Pain of left heel 10/08/2013   • S/P cholecystectomy 12/07/2010   • Sleep apnea 10/15/2010   • History of low back pain 04/21/2009   • Dyslipidemia 04/21/2009   • Depression 04/21/2009   • Preventative health care 04/21/2009       Past Medical History:   Diagnosis Date   • Anxiety 4/21/2009   • Blood in urine    • Chickenpox    • Chronic low back pain 4/21/2009   • Constipation    • Depression    • Diarrhea    • Dyslipidemia 4/21/2009   • Eczema 4/21/2009 "   • Hypovitaminosis D 2009   • Obesity    • RLS (restless legs syndrome)    • Sleep apnea    • Surveillance for birth control, oral contraceptives 2009   • Wears glasses         Past Surgical History:   Procedure Laterality Date   • BREAST BIOPSY Right 2021    benign   • AR REDUCTION OF BREAST     • MAMMOPLASTY REDUCTION  2010    Performed by LUANNE LARRY at SURGERY [facility]   • PRIMARY C SECTION         • MOSHE BY LAPAROSCOPY         Family History   Problem Relation Age of Onset   • Cancer Mother         lung cancer,osteoporosis   • Hypertension Brother    • Heart Disease Brother    • Alcohol abuse Brother    • Diabetes Father    • Lung Disease Father    • Sleep Apnea Father    • Cancer Maternal Aunt    • Cancer Maternal Grandmother    • Alcohol/Drug Maternal Grandfather    • Stroke Paternal Grandfather    • Cancer Maternal Aunt        Social History     Socioeconomic History   • Marital status:      Spouse name: Not on file   • Number of children: Not on file   • Years of education: Not on file   • Highest education level: Not on file   Occupational History   • Not on file   Tobacco Use   • Smoking status: Never Smoker   • Smokeless tobacco: Never Used   Vaping Use   • Vaping Use: Never used   Substance and Sexual Activity   • Alcohol use: No     Alcohol/week: 0.0 oz   • Drug use: No   • Sexual activity: Yes     Partners: Male   Other Topics Concern   • Not on file   Social History Narrative   • Not on file     Social Determinants of Health     Financial Resource Strain:    • Difficulty of Paying Living Expenses: Not on file   Food Insecurity:    • Worried About Running Out of Food in the Last Year: Not on file   • Ran Out of Food in the Last Year: Not on file   Transportation Needs:    • Lack of Transportation (Medical): Not on file   • Lack of Transportation (Non-Medical): Not on file   Physical Activity:    • Days of Exercise per Week: Not on file  "  • Minutes of Exercise per Session: Not on file   Stress:    • Feeling of Stress : Not on file   Social Connections:    • Frequency of Communication with Friends and Family: Not on file   • Frequency of Social Gatherings with Friends and Family: Not on file   • Attends Bahai Services: Not on file   • Active Member of Clubs or Organizations: Not on file   • Attends Club or Organization Meetings: Not on file   • Marital Status: Not on file   Intimate Partner Violence:    • Fear of Current or Ex-Partner: Not on file   • Emotionally Abused: Not on file   • Physically Abused: Not on file   • Sexually Abused: Not on file   Housing Stability:    • Unable to Pay for Housing in the Last Year: Not on file   • Number of Places Lived in the Last Year: Not on file   • Unstable Housing in the Last Year: Not on file       Current Outpatient Medications   Medication Sig Dispense Refill   • buPROPion SR (WELLBUTRIN-SR) 150 MG TABLET SR 12 HR sustained-release tablet      • phentermine 15 MG capsule      • FLUoxetine (PROZAC) 20 MG Cap Take 1 Capsule by mouth every day. 30 Capsule 5   • mupirocin (BACTROBAN) 2 % Ointment Apply 1 Application topically 2 times a day. 22 g 1   • hydrOXYzine HCl (ATARAX) 10 MG Tab      • fluoxetine (PROZAC) 40 MG capsule Take 2 Capsules by mouth every day. 30 capsule 1   • clobetasol (TEMOVATE) 0.05 % external solution APPLY 1 APPLICATION TO AFFECTED AREA(S) 2 TIMES A DAY. 50 mL 5   • Omega-3 Fatty Acids (FISH OIL) 1000 MG Cap capsule Take 1,000 mg by mouth 3 times a day, with meals.     • Cholecalciferol (VITAMIN D) 1000 UNIT CAPS Take  by mouth every day.     • buPROPion (WELLBUTRIN XL) 150 MG XL tablet Take 1 Tablet by mouth every morning. 30 Tablet 11     No current facility-administered medications for this visit.    \"CURRENT RX\"    ALLERGIES: Amoxicillin    ROS    Constitutional: Denies  weight loss, fatigue  Cardiovascular: Denies chest pain, tightness, palpitations, swelling in legs/feet, " "difficulty breathing when lying down but gets better when sitting up.   Respiratory: Endorses some shortness of breath during the day with exertion  Sleep: per HPI  Gastrointestinal: Denies  difficulty swallowing, heartburn.  Musculoskeletal: Endorses some back pain and other joint pain.        PHYSICAL EXAM    NECK CIRCUMFERENCE: 15 in  /80 (BP Location: Left arm, Patient Position: Sitting, BP Cuff Size: Adult)   Pulse 74   Resp 16   Ht 1.626 m (5' 4\")   Wt 88.5 kg (195 lb)   LMP 10/10/2010   SpO2 96%   BMI 33.47 kg/m²   Appearance: mildly obese, looks stated age, no acute distress  Eyes:   EOMI  ENMT: masked  Neck: Supple, trachea midline, no masses  Respiratory effort:  No intercostal retractions or use of accessory muscles  Musculoskeletal:  Grossly normal; gait and station normal  Skin:  No cyanosis  Neurologic: oriented to person, time, place, and purpose; judgement intact  Psychiatric:  No depression, anxiety, agitation      Medical Decision Making       The medical record was reviewed in its entirety including the referral notes, records from primary care, consultants notes, hospital records, lab, imaging, microbiology, immunology, and immunizations.  Care gaps identified and reviewed with the patient.    Diagnostic and titration nocturnal polysomnogram's, home sleep apnea tests, continuous nocturnal oximetry results, multiple sleep latency tests, and compliance reports reviewed.    ASSESSMENT/PLAN:    1. SAMARA on CPAP her machine is 7 years old but it is working well.  Next year when she comes in for her annual visit next year  a new machine will be ordered.  She is aware that she is welcome back anytime during the year should she have difficulty with her machine.    - DME Mask and Supplies  - DME Other  Pressure changed:  Min pressure from 9 to 10      Has been advised to continue the current Pap Therapy.  Also advised to clean equipment frequently, and get new mask and supplies as allowed by " insurance and DME.  Patient was advised to NEVER use  OZONE containing cleaning systems such as So Clean.    The risks of untreated sleep apnea were discussed with the patient at length. Patients with SAMARA are at increased risk of cardiovascular disease including coronary artery disease, systemic arterial hypertension, pulmonary arterial hypertension, cardiac arrhythmias, and stroke. SAMARA patients have an increased risk of motor vehicle accidents, type 2 diabetes, chronic kidney disease, and non-alcoholic liver disease. The patient was advised to avoid driving a motor vehicle when drowsy.      Have advised the patient to follow up with the appropriate healthcare practitioners for all other medical problems and issues.      Return in about 1 year (around 2/9/2023) for Annual visit.        Please note that this dictation was created using voice recognition software.  I have made every reasonable attempt to correct obvious errors, I expect that there are errors of grammar and possibly content that I did not discover before finalizing this note.              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  Height: 54  Current weight: 190  6 months ago: same  At age 20: 125  What is the reason for your visit today?: required by insurance  Name of person referring you to the Sleep Center: Jordan Rand  Have you ever been hospitalized?: Yes  Reason, year, and hospital in which you were hospitalized:: surgery 1993, surgery 2011  Have you ever had problems with anesthesia?: Yes  Have you experienced post-operative delirium?: No  Any complications with surgery?: No  What year did you receive your last Flu shot?: 2021  What year did you receive you last Pneumonia shot?: not sure  Please briefly describe your sleep problem and how old you were when it began.: 2005- insomnia and apnea  How does this affect your daily life and  activities? Please also rate how serious of a problem this is (1 = Not at all, 10 = Very Serious).: 1  Have you had any previous evaluations, examinations, or treatment for this sleep problem or any other problems with your sleep? If so, please describe the evaluation, treatment, and results.: given a cpap  What time do you usually go to bed and wake up on: Weekdays? Weekends?: 10-11 pm bed wake 7-8  Do you have a regular bed partner?: Yes  How many minutes does it usually take to fall asleep at night after turning off the lights?: 60-90  What do you ordinarily do just prior to turning out the lights and attempting to go to sleep (e.g., reading, TV, baths, etc.)?: tv  On average, how many times do you wake up during the night?: 1-3  On average, how many times do you wake up to use the bathroom?: 0-1  Do you often wake up too early in the morning and are unable to return to sleep?: no  On average, how many hours of sleep do you get per night?: 7-9  How do you usually awaken?  Alarm, spontaneously, or other?: spontaneously  Is it difficult for you to awaken and get out of bed after sleeping? (Not at all, Sometimes, Very): not anymore  Do you nap or return to bed after arising?: no  Are you bothered by sleepiness during the day?: no  Do you feel that you get too much sleep at night?: no  Do you feel that you get too little sleep at night?: no  Do you usually feel tired during the day? If so, what do you attribute this to?: no  Do you find yourself falling asleep when you don't mean to? : no  If yes, how long does your sleep episode last?: no  Have you ever suddenly fallen?: no  Have you ever experienced sudden body weakness?: no  Have you ever experienced weakness or paralysis upon going to sleep?: no  Have you ever experienced weakness or paralysis upon awakening from sleep?: no  Have you ever experienced seeing things or hearing voices/noises: That weren't real? On going to sleep? During the night? On awakening from  "sleep? During the day?: no  Do you have difficulty breathing at night? If yes, briefly describe.: no  Have you been told you snore while asleep? If so, does it disturb a bed partner (or someone in the same room), or someone in the next room?: not any more  Have you ever experienced doing something without being aware of the action? If yes, please describe.: no  Have you ever experienced upon lying in bed before sleep or on awakening from sleep: Restlessness of legs, \"nervous legs\", \"creeping crawling\" sensation of legs, or twitching of legs?: no  Have you ever been told that your arms or legs jerk or twitch while you are asleep? If yes, how many times per night does this occur?: no  Do you know, or have you ever been told that you do any of the following while sleeping: talk, walk, grit teeth, wet the bed, wake up screaming or seemingly afraid, have disturbing dreams, have unusual movements, wake up with headaches, (males) have erections? If yes to any of these, please indicate how many times per week, age started, last occurrence, treatment received.: occ.  Has anyone in your family been known to have any sleep problems? If yes, please list the type of problem (e.g., trouble getting to sleep, too sleepy, bed wetting, etc.), the relationship of this person to you, and the treatment received.: father - sleep apnea    Conflicting answers have been found for some questions. Please document the patient's answers manually.       "

## 2022-02-15 ENCOUNTER — APPOINTMENT (RX ONLY)
Dept: URBAN - METROPOLITAN AREA CLINIC 35 | Facility: CLINIC | Age: 62
Setting detail: DERMATOLOGY
End: 2022-02-15

## 2022-02-15 DIAGNOSIS — L81.4 OTHER MELANIN HYPERPIGMENTATION: ICD-10-CM

## 2022-02-15 DIAGNOSIS — D22 MELANOCYTIC NEVI: ICD-10-CM

## 2022-02-15 DIAGNOSIS — L20.89 OTHER ATOPIC DERMATITIS: ICD-10-CM

## 2022-02-15 DIAGNOSIS — L82.1 OTHER SEBORRHEIC KERATOSIS: ICD-10-CM

## 2022-02-15 DIAGNOSIS — L21.8 OTHER SEBORRHEIC DERMATITIS: ICD-10-CM

## 2022-02-15 DIAGNOSIS — Z71.89 OTHER SPECIFIED COUNSELING: ICD-10-CM

## 2022-02-15 DIAGNOSIS — L40.0 PSORIASIS VULGARIS: ICD-10-CM

## 2022-02-15 PROBLEM — D22.5 MELANOCYTIC NEVI OF TRUNK: Status: ACTIVE | Noted: 2022-02-15

## 2022-02-15 PROBLEM — L30.9 DERMATITIS, UNSPECIFIED: Status: ACTIVE | Noted: 2022-02-15

## 2022-02-15 PROCEDURE — ? PRESCRIPTION

## 2022-02-15 PROCEDURE — 99204 OFFICE O/P NEW MOD 45 MIN: CPT

## 2022-02-15 PROCEDURE — ? ADDITIONAL NOTES

## 2022-02-15 PROCEDURE — ? COUNSELING

## 2022-02-15 RX ORDER — TRIAMCINOLONE ACETONIDE 1 MG/G
THIN LAYER CREAM TOPICAL BID
Qty: 80 | Refills: 1 | Status: ERX | COMMUNITY
Start: 2022-02-15

## 2022-02-15 RX ORDER — CLOBETASOL PROPIONATE 0.5 MG/ML
SOLUTION TOPICAL BID
Qty: 50 | Refills: 2 | Status: ERX

## 2022-02-15 RX ORDER — CALCIPOTRIENE 0.05 MG/ML
SMALL AMOUNT SOLUTION TOPICAL BID
Qty: 60 | Refills: 3 | Status: ERX | COMMUNITY
Start: 2022-02-15

## 2022-02-15 RX ORDER — PIMECROLIMUS 10 MG/G
THIN LAYER CREAM TOPICAL BID
Qty: 60 | Refills: 3 | Status: ERX | COMMUNITY
Start: 2022-02-15

## 2022-02-15 RX ADMIN — PIMECROLIMUS THIN LAYER: 10 CREAM TOPICAL at 00:00

## 2022-02-15 RX ADMIN — TRIAMCINOLONE ACETONIDE THIN LAYER: 1 CREAM TOPICAL at 00:00

## 2022-02-15 RX ADMIN — CALCIPOTRIENE SMALL AMOUNT: 0.05 SOLUTION TOPICAL at 00:00

## 2022-02-15 ASSESSMENT — LOCATION SIMPLE DESCRIPTION DERM
LOCATION SIMPLE: RIGHT UPPER BACK
LOCATION SIMPLE: POSTERIOR SCALP
LOCATION SIMPLE: LEFT UPPER BACK
LOCATION SIMPLE: RIGHT BUTTOCK
LOCATION SIMPLE: RIGHT SHOULDER
LOCATION SIMPLE: LEFT CHEEK
LOCATION SIMPLE: RIGHT THIGH
LOCATION SIMPLE: RIGHT CHEEK

## 2022-02-15 ASSESSMENT — LOCATION DETAILED DESCRIPTION DERM
LOCATION DETAILED: RIGHT POSTERIOR SHOULDER
LOCATION DETAILED: RIGHT SUPERIOR UPPER BACK
LOCATION DETAILED: RIGHT INFERIOR MEDIAL MALAR CHEEK
LOCATION DETAILED: MID-OCCIPITAL SCALP
LOCATION DETAILED: RIGHT ANTERIOR PROXIMAL THIGH
LOCATION DETAILED: LEFT MEDIAL UPPER BACK
LOCATION DETAILED: LEFT INFERIOR MEDIAL MALAR CHEEK
LOCATION DETAILED: RIGHT BUTTOCK

## 2022-02-15 ASSESSMENT — LOCATION ZONE DERM
LOCATION ZONE: SCALP
LOCATION ZONE: ARM
LOCATION ZONE: FACE
LOCATION ZONE: LEG
LOCATION ZONE: TRUNK

## 2022-02-15 ASSESSMENT — BSA PSORIASIS: % BODY COVERED IN PSORIASIS: 1

## 2022-02-15 NOTE — PROCEDURE: ADDITIONAL NOTES
Detail Level: Simple
Additional Notes: Includes area of concern on right thigh
Render Risk Assessment In Note?: no

## 2022-03-29 DIAGNOSIS — E66.9 CLASS 1 OBESITY WITHOUT SERIOUS COMORBIDITY WITH BODY MASS INDEX (BMI) OF 34.0 TO 34.9 IN ADULT, UNSPECIFIED OBESITY TYPE: ICD-10-CM

## 2022-03-29 RX ORDER — PHENTERMINE HYDROCHLORIDE 15 MG/1
CAPSULE ORAL
Qty: 30 CAPSULE | OUTPATIENT
Start: 2022-03-29

## 2022-03-29 RX ORDER — PHENTERMINE HYDROCHLORIDE 15 MG/1
15 CAPSULE ORAL EVERY MORNING
Qty: 30 CAPSULE | Refills: 2 | Status: SHIPPED | OUTPATIENT
Start: 2022-03-29 | End: 2022-04-28

## 2022-05-02 ENCOUNTER — TELEPHONE (OUTPATIENT)
Dept: MEDICAL GROUP | Facility: MEDICAL CENTER | Age: 62
End: 2022-05-02

## 2022-05-02 ENCOUNTER — HOSPITAL ENCOUNTER (OUTPATIENT)
Dept: LAB | Facility: MEDICAL CENTER | Age: 62
End: 2022-05-02
Attending: INTERNAL MEDICINE
Payer: COMMERCIAL

## 2022-05-02 ENCOUNTER — TELEPHONE (OUTPATIENT)
Dept: MEDICAL GROUP | Facility: MEDICAL CENTER | Age: 62
End: 2022-05-02
Payer: COMMERCIAL

## 2022-05-02 DIAGNOSIS — N39.0 URINARY TRACT INFECTION WITHOUT HEMATURIA, SITE UNSPECIFIED: ICD-10-CM

## 2022-05-02 LAB
APPEARANCE UR: CLEAR
BACTERIA #/AREA URNS HPF: NEGATIVE /HPF
BILIRUB UR QL STRIP.AUTO: NEGATIVE
COLOR UR: YELLOW
EPI CELLS #/AREA URNS HPF: NEGATIVE /HPF
GLUCOSE UR STRIP.AUTO-MCNC: NEGATIVE MG/DL
HYALINE CASTS #/AREA URNS LPF: NORMAL /LPF
KETONES UR STRIP.AUTO-MCNC: NEGATIVE MG/DL
LEUKOCYTE ESTERASE UR QL STRIP.AUTO: ABNORMAL
MICRO URNS: ABNORMAL
NITRITE UR QL STRIP.AUTO: NEGATIVE
PH UR STRIP.AUTO: 5.5 [PH] (ref 5–8)
PROT UR QL STRIP: NEGATIVE MG/DL
RBC # URNS HPF: NORMAL /HPF
RBC UR QL AUTO: NEGATIVE
SP GR UR STRIP.AUTO: 1.01
UROBILINOGEN UR STRIP.AUTO-MCNC: 0.2 MG/DL
WBC #/AREA URNS HPF: NORMAL /HPF

## 2022-05-02 PROCEDURE — 81001 URINALYSIS AUTO W/SCOPE: CPT

## 2022-05-02 NOTE — TELEPHONE ENCOUNTER
Please notify the patient I would recommend going to [facility] across the street from us, or to the [facility] urgent care downtown.  That way she could at least have an x-ray and they might be able to provide an injection if it is indicated.

## 2022-05-02 NOTE — TELEPHONE ENCOUNTER
Ramona Garrido [facility]  [phone]       Pt called and LM. States she took a home test and believes she has a UTI. Would like lab order.

## 2022-05-02 NOTE — TELEPHONE ENCOUNTER
Ramona Radhika [facility]  [phone]    Pt c/o R Knee pain x 2 yrs. Had PT but it didn't help. States that it is now keeping her awake. States she thought you mentioned a shot she could get and would like to proceed in that direction. Pt leaving on Wed and does not know when she will return. Please advise.

## 2022-05-03 ENCOUNTER — TELEPHONE (OUTPATIENT)
Dept: MEDICAL GROUP | Facility: MEDICAL CENTER | Age: 62
End: 2022-05-03
Payer: COMMERCIAL

## 2022-05-03 DIAGNOSIS — N30.00 ACUTE CYSTITIS WITHOUT HEMATURIA: ICD-10-CM

## 2022-05-03 NOTE — TELEPHONE ENCOUNTER
----- Message from Jordan Rand M.D. sent at 5/3/2022  7:48 AM PDT -----  Please notify the patient that the urine test shows no evidence of infection and the lab will keep her urine for 48 hours to be sure there is no bacterial growth.

## 2022-05-03 NOTE — TELEPHONE ENCOUNTER
Please notify the patient that the urine test shows no evidence of infection and the lab will keep her urine for 48 hours to be sure there is no bacterial growth.

## 2022-05-22 ENCOUNTER — OFFICE VISIT (OUTPATIENT)
Dept: URGENT CARE | Facility: CLINIC | Age: 62
End: 2022-05-22
Payer: COMMERCIAL

## 2022-05-22 ENCOUNTER — HOSPITAL ENCOUNTER (OUTPATIENT)
Facility: MEDICAL CENTER | Age: 62
End: 2022-05-22
Attending: FAMILY MEDICINE
Payer: COMMERCIAL

## 2022-05-22 VITALS
HEIGHT: 64 IN | WEIGHT: 185 LBS | OXYGEN SATURATION: 97 % | TEMPERATURE: 97.2 F | DIASTOLIC BLOOD PRESSURE: 76 MMHG | BODY MASS INDEX: 31.58 KG/M2 | RESPIRATION RATE: 14 BRPM | HEART RATE: 74 BPM | SYSTOLIC BLOOD PRESSURE: 124 MMHG

## 2022-05-22 DIAGNOSIS — R39.9 UTI SYMPTOMS: ICD-10-CM

## 2022-05-22 DIAGNOSIS — N30.01 ACUTE CYSTITIS WITH HEMATURIA: ICD-10-CM

## 2022-05-22 LAB
APPEARANCE UR: CLEAR
BILIRUB UR STRIP-MCNC: NEGATIVE MG/DL
COLOR UR AUTO: YELLOW
GLUCOSE UR STRIP.AUTO-MCNC: NEGATIVE MG/DL
KETONES UR STRIP.AUTO-MCNC: NEGATIVE MG/DL
LEUKOCYTE ESTERASE UR QL STRIP.AUTO: NORMAL
NITRITE UR QL STRIP.AUTO: NEGATIVE
PH UR STRIP.AUTO: 5.5 [PH] (ref 5–8)
PROT UR QL STRIP: NEGATIVE MG/DL
RBC UR QL AUTO: NORMAL
SP GR UR STRIP.AUTO: 1.01
UROBILINOGEN UR STRIP-MCNC: 0.2 MG/DL

## 2022-05-22 PROCEDURE — 87077 CULTURE AEROBIC IDENTIFY: CPT

## 2022-05-22 PROCEDURE — 99213 OFFICE O/P EST LOW 20 MIN: CPT | Performed by: FAMILY MEDICINE

## 2022-05-22 PROCEDURE — 81002 URINALYSIS NONAUTO W/O SCOPE: CPT | Performed by: FAMILY MEDICINE

## 2022-05-22 PROCEDURE — 87186 SC STD MICRODIL/AGAR DIL: CPT

## 2022-05-22 PROCEDURE — 87086 URINE CULTURE/COLONY COUNT: CPT

## 2022-05-22 RX ORDER — NITROFURANTOIN 25; 75 MG/1; MG/1
100 CAPSULE ORAL EVERY 12 HOURS
Qty: 10 CAPSULE | Refills: 0 | Status: SHIPPED | OUTPATIENT
Start: 2022-05-22 | End: 2023-06-24 | Stop reason: SDUPTHER

## 2022-05-22 ASSESSMENT — FIBROSIS 4 INDEX: FIB4 SCORE: 1.35

## 2022-05-22 NOTE — PROGRESS NOTES
"  Subjective:      61 y.o. female presents to urgent care for concerns of a bladder infection.  For the last couple of days she has been experiencing increased urinary urgency, frequency, and dysuria.  No associated hematuria.  Bowel movements are regular and soft.  She drinks an average of 2L of water and 1 caffeinated beverages.  She is not currently sexually.    She denies any other questions or concerns at this time.    Current problem list, medication, and past medical/surgical history were reviewed in Epic.    ROS  See HPI     Objective:      /76 (BP Location: Left arm, Patient Position: Sitting, BP Cuff Size: Adult)   Pulse 74   Temp 36.2 °C (97.2 °F) (Temporal)   Resp 14   Ht 1.626 m (5' 4\")   Wt 83.9 kg (185 lb)   LMP 10/10/2010   SpO2 97%   BMI 31.76 kg/m²     Physical Exam  Constitutional:       General: She is not in acute distress.     Appearance: She is not diaphoretic.   Cardiovascular:      Rate and Rhythm: Normal rate and regular rhythm.      Heart sounds: Normal heart sounds.   Pulmonary:      Effort: Pulmonary effort is normal. No respiratory distress.      Breath sounds: Normal breath sounds.   Abdominal:      General: Bowel sounds are normal.      Palpations: Abdomen is soft.      Tenderness: There is no abdominal tenderness. There is no right CVA tenderness or left CVA tenderness.   Neurological:      Mental Status: She is alert.   Psychiatric:         Mood and Affect: Affect normal.         Judgment: Judgment normal.       Assessment/Plan:     1. Acute cystitis with hematuria  2. UTI symptoms  Urinalysis reveals UTI.  Prescription for Macrobid has been sent.  Urine culture has also been sent.  - Urine Culture; Future  - nitrofurantoin (MACROBID) 100 MG Cap; Take 1 Capsule by mouth every 12 hours for 5 days.  Dispense: 10 Capsule; Refill: 0  - POCT Urinalysis      Instructed to return to Urgent Care or nearest Emergency Department if symptoms fail to improve, for any change in " condition, further concerns, or new concerning symptoms. Patient states understanding of the plan of care and discharge instructions.    Kelsi Mar M.D.

## 2022-07-25 ENCOUNTER — HOSPITAL ENCOUNTER (OUTPATIENT)
Dept: RADIOLOGY | Facility: MEDICAL CENTER | Age: 62
End: 2022-07-25
Attending: OBSTETRICS & GYNECOLOGY
Payer: COMMERCIAL

## 2022-07-25 DIAGNOSIS — Z12.31 ENCOUNTER FOR MAMMOGRAM TO ESTABLISH BASELINE MAMMOGRAM: ICD-10-CM

## 2022-09-16 ENCOUNTER — TELEPHONE (OUTPATIENT)
Dept: MEDICAL GROUP | Facility: MEDICAL CENTER | Age: 62
End: 2022-09-16
Payer: COMMERCIAL

## 2022-09-16 DIAGNOSIS — M79.672 PAIN IN BOTH FEET: ICD-10-CM

## 2022-09-16 DIAGNOSIS — M25.561 PAIN IN BOTH KNEES, UNSPECIFIED CHRONICITY: ICD-10-CM

## 2022-09-16 DIAGNOSIS — M79.671 PAIN IN BOTH FEET: ICD-10-CM

## 2022-09-16 DIAGNOSIS — M25.562 PAIN IN BOTH KNEES, UNSPECIFIED CHRONICITY: ICD-10-CM

## 2022-09-22 ENCOUNTER — HOSPITAL ENCOUNTER (OUTPATIENT)
Dept: RADIOLOGY | Facility: MEDICAL CENTER | Age: 62
End: 2022-09-22
Attending: OBSTETRICS & GYNECOLOGY
Payer: COMMERCIAL

## 2022-09-22 PROCEDURE — 77063 BREAST TOMOSYNTHESIS BI: CPT

## 2022-09-30 ENCOUNTER — TELEPHONE (OUTPATIENT)
Dept: MEDICAL GROUP | Facility: MEDICAL CENTER | Age: 62
End: 2022-09-30
Payer: COMMERCIAL

## 2022-09-30 DIAGNOSIS — Z12.11 COLON CANCER SCREENING: ICD-10-CM

## 2022-10-13 PROBLEM — M76.71 PERONEAL TENDINITIS OF RIGHT LOWER EXTREMITY: Status: ACTIVE | Noted: 2022-10-13

## 2022-10-14 ENCOUNTER — HOSPITAL ENCOUNTER (OUTPATIENT)
Dept: LAB | Facility: MEDICAL CENTER | Age: 62
End: 2022-10-14
Attending: INTERNAL MEDICINE
Payer: COMMERCIAL

## 2022-10-14 ENCOUNTER — TELEPHONE (OUTPATIENT)
Dept: MEDICAL GROUP | Facility: MEDICAL CENTER | Age: 62
End: 2022-10-14
Payer: COMMERCIAL

## 2022-10-14 DIAGNOSIS — N39.0 URINARY TRACT INFECTION WITHOUT HEMATURIA, SITE UNSPECIFIED: ICD-10-CM

## 2022-10-14 LAB
APPEARANCE UR: CLEAR
BACTERIA #/AREA URNS HPF: NEGATIVE /HPF
BILIRUB UR QL STRIP.AUTO: NEGATIVE
COLOR UR: YELLOW
EPI CELLS #/AREA URNS HPF: NORMAL /HPF
GLUCOSE UR STRIP.AUTO-MCNC: NEGATIVE MG/DL
KETONES UR STRIP.AUTO-MCNC: NEGATIVE MG/DL
LEUKOCYTE ESTERASE UR QL STRIP.AUTO: NEGATIVE
MICRO URNS: ABNORMAL
NITRITE UR QL STRIP.AUTO: NEGATIVE
PH UR STRIP.AUTO: 6.5 [PH] (ref 5–8)
PROT UR QL STRIP: NEGATIVE MG/DL
RBC # URNS HPF: NORMAL /HPF
RBC UR QL AUTO: ABNORMAL
SP GR UR STRIP.AUTO: 1.01
UROBILINOGEN UR STRIP.AUTO-MCNC: 0.2 MG/DL
WBC #/AREA URNS HPF: NORMAL /HPF

## 2022-10-14 PROCEDURE — 81001 URINALYSIS AUTO W/SCOPE: CPT

## 2022-11-23 ENCOUNTER — HOSPITAL ENCOUNTER (OUTPATIENT)
Facility: MEDICAL CENTER | Age: 62
End: 2022-11-23
Attending: FAMILY MEDICINE
Payer: COMMERCIAL

## 2022-11-23 ENCOUNTER — OFFICE VISIT (OUTPATIENT)
Dept: URGENT CARE | Facility: CLINIC | Age: 62
End: 2022-11-23
Payer: COMMERCIAL

## 2022-11-23 VITALS
HEART RATE: 68 BPM | RESPIRATION RATE: 16 BRPM | OXYGEN SATURATION: 98 % | TEMPERATURE: 99.3 F | WEIGHT: 180 LBS | BODY MASS INDEX: 29.99 KG/M2 | DIASTOLIC BLOOD PRESSURE: 84 MMHG | SYSTOLIC BLOOD PRESSURE: 142 MMHG | HEIGHT: 65 IN

## 2022-11-23 DIAGNOSIS — R03.0 ELEVATED BLOOD PRESSURE READING: ICD-10-CM

## 2022-11-23 DIAGNOSIS — Z03.818 ENCOUNTER FOR PATIENT CONCERN ABOUT EXPOSURE TO INFECTIOUS ORGANISM: ICD-10-CM

## 2022-11-23 LAB
FLUAV+FLUBV AG SPEC QL IA: NEGATIVE
INT CON NEG: NORMAL
INT CON POS: NORMAL

## 2022-11-23 PROCEDURE — 87804 INFLUENZA ASSAY W/OPTIC: CPT | Performed by: FAMILY MEDICINE

## 2022-11-23 PROCEDURE — 99213 OFFICE O/P EST LOW 20 MIN: CPT | Performed by: FAMILY MEDICINE

## 2022-11-23 PROCEDURE — U0005 INFEC AGEN DETEC AMPLI PROBE: HCPCS

## 2022-11-23 PROCEDURE — U0003 INFECTIOUS AGENT DETECTION BY NUCLEIC ACID (DNA OR RNA); SEVERE ACUTE RESPIRATORY SYNDROME CORONAVIRUS 2 (SARS-COV-2) (CORONAVIRUS DISEASE [COVID-19]), AMPLIFIED PROBE TECHNIQUE, MAKING USE OF HIGH THROUGHPUT TECHNOLOGIES AS DESCRIBED BY CMS-2020-01-R: HCPCS

## 2022-11-23 ASSESSMENT — FIBROSIS 4 INDEX: FIB4 SCORE: 1.35

## 2022-11-23 NOTE — PROGRESS NOTES
"  Subjective:      61 y.o. female presents to urgent care for cold symptoms that started on Monday.  She is experiencing fever, cough, fatigue, runny nose, headache, and body ache.  No sore throat or diarrhea.  She has been using Advil with good relief in symptoms.  She denies any tobacco product use.  No history of asthma or COPD.  She is fully vaccinated against COVID.  No known sick contacts.    Blood pressure is elevated today in urgent care.  She does not have a history of hypertension and does not take any medication for this.  She denies any chest pain, palpitations, or shortness of breath.  She denies any other questions or concerns at this time.    Current problem list, medication, and past medical/surgical history were reviewed in Epic.    ROS  See HPI     Objective:      BP (!) 142/84 (BP Location: Right arm, Patient Position: Sitting, BP Cuff Size: Adult)   Pulse 68   Temp 37.4 °C (99.3 °F) (Temporal)   Resp 16   Ht 1.651 m (5' 5\")   Wt 81.6 kg (180 lb)   LMP 10/10/2010   SpO2 98%   BMI 29.95 kg/m²     Physical Exam  Constitutional:       General: She is not in acute distress.     Appearance: She is not diaphoretic.   HENT:      Right Ear: Tympanic membrane, ear canal and external ear normal.      Left Ear: Tympanic membrane, ear canal and external ear normal.      Mouth/Throat:      Tongue: Tongue does not deviate from midline.      Palate: No lesions.      Pharynx: Uvula midline. No posterior oropharyngeal erythema.      Tonsils: No tonsillar exudate. 1+ on the right. 1+ on the left.   Cardiovascular:      Rate and Rhythm: Normal rate and regular rhythm.      Heart sounds: Normal heart sounds.   Pulmonary:      Effort: Pulmonary effort is normal. No respiratory distress.      Breath sounds: Normal breath sounds.   Neurological:      Mental Status: She is alert.   Psychiatric:         Mood and Affect: Affect normal.         Judgment: Judgment normal.     Assessment/Plan:     1. Encounter for " patient concern about exposure to infectious organism  Negative flu. Testing performed for COVID-19. In the meantime patient was advised to isolate until COVID test results returned. I encouraged mask use, frequent handwashing, wiping down hard surfaces, etc. Tylenol and Ibuprofen were recommended for symptomatic relief. If positive they will be contacted by their local health department regarding return to work/school protocols. Patient is currently without indication of need for higher level of care. Patient/Guardian was given precautionary signs/symptoms that mandate immediate follow up and evaluation in the ED. The patient and/or guardian demonstrated a good understanding and agreed with the treatment plan.  - POCT Influenza A/B  - SARS-CoV-2 PCR (24 hour In-House): Collect NP swab in VTM; Future    2. Elevated blood pressure reading  Asymptomatic.  Follow-up with PCP.      Instructed to return to Urgent Care or nearest Emergency Department if symptoms fail to improve, for any change in condition, further concerns, or new concerning symptoms. Patient states understanding of the plan of care and discharge instructions.    Kelsi Mar M.D.

## 2022-11-24 DIAGNOSIS — Z03.818 ENCOUNTER FOR PATIENT CONCERN ABOUT EXPOSURE TO INFECTIOUS ORGANISM: ICD-10-CM

## 2022-11-24 LAB
SARS-COV-2 RNA RESP QL NAA+PROBE: NOTDETECTED
SPECIMEN SOURCE: NORMAL

## 2022-12-07 ENCOUNTER — HOSPITAL ENCOUNTER (OUTPATIENT)
Dept: LAB | Facility: MEDICAL CENTER | Age: 62
End: 2022-12-07
Attending: INTERNAL MEDICINE
Payer: COMMERCIAL

## 2022-12-07 ENCOUNTER — TELEPHONE (OUTPATIENT)
Dept: MEDICAL GROUP | Facility: MEDICAL CENTER | Age: 62
End: 2022-12-07

## 2022-12-07 DIAGNOSIS — R30.0 DYSURIA: ICD-10-CM

## 2022-12-07 LAB
APPEARANCE UR: CLEAR
BACTERIA #/AREA URNS HPF: ABNORMAL /HPF
BILIRUB UR QL STRIP.AUTO: NEGATIVE
CAOX CRY #/AREA URNS HPF: ABNORMAL /HPF
COLOR UR: YELLOW
EPI CELLS #/AREA URNS HPF: ABNORMAL /HPF
GLUCOSE UR STRIP.AUTO-MCNC: NEGATIVE MG/DL
HYALINE CASTS #/AREA URNS LPF: ABNORMAL /LPF
KETONES UR STRIP.AUTO-MCNC: NEGATIVE MG/DL
LEUKOCYTE ESTERASE UR QL STRIP.AUTO: ABNORMAL
MICRO URNS: ABNORMAL
NITRITE UR QL STRIP.AUTO: POSITIVE
PH UR STRIP.AUTO: 5.5 [PH] (ref 5–8)
PROT UR QL STRIP: NEGATIVE MG/DL
RBC # URNS HPF: ABNORMAL /HPF
RBC UR QL AUTO: NEGATIVE
RENAL EPI CELLS #/AREA URNS HPF: ABNORMAL /HPF
SP GR UR STRIP.AUTO: 1.03
UROBILINOGEN UR STRIP.AUTO-MCNC: 0.2 MG/DL
WBC #/AREA URNS HPF: ABNORMAL /HPF

## 2022-12-07 PROCEDURE — 87077 CULTURE AEROBIC IDENTIFY: CPT

## 2022-12-07 PROCEDURE — 87186 SC STD MICRODIL/AGAR DIL: CPT

## 2022-12-07 PROCEDURE — 81001 URINALYSIS AUTO W/SCOPE: CPT

## 2022-12-07 PROCEDURE — 87086 URINE CULTURE/COLONY COUNT: CPT

## 2022-12-09 ENCOUNTER — OFFICE VISIT (OUTPATIENT)
Dept: MEDICAL GROUP | Facility: MEDICAL CENTER | Age: 62
End: 2022-12-09
Payer: COMMERCIAL

## 2022-12-09 ENCOUNTER — TELEPHONE (OUTPATIENT)
Dept: MEDICAL GROUP | Facility: MEDICAL CENTER | Age: 62
End: 2022-12-09

## 2022-12-09 DIAGNOSIS — R05.1 ACUTE COUGH: ICD-10-CM

## 2022-12-09 DIAGNOSIS — J02.9 PHARYNGITIS, UNSPECIFIED ETIOLOGY: ICD-10-CM

## 2022-12-09 DIAGNOSIS — U07.1 COVID-19: ICD-10-CM

## 2022-12-09 LAB
BACTERIA UR CULT: ABNORMAL
BACTERIA UR CULT: ABNORMAL
EXTERNAL QUALITY CONTROL: ABNORMAL
FLUAV+FLUBV AG SPEC QL IA: NEGATIVE
INT CON NEG: ABNORMAL
INT CON NEG: NORMAL
INT CON NEG: NORMAL
INT CON POS: ABNORMAL
INT CON POS: NORMAL
INT CON POS: NORMAL
S PYO AG THROAT QL: NEGATIVE
SARS-COV+SARS-COV-2 AG RESP QL IA.RAPID: POSITIVE
SIGNIFICANT IND 70042: ABNORMAL
SITE SITE: ABNORMAL
SOURCE SOURCE: ABNORMAL

## 2022-12-09 PROCEDURE — 87804 INFLUENZA ASSAY W/OPTIC: CPT | Performed by: STUDENT IN AN ORGANIZED HEALTH CARE EDUCATION/TRAINING PROGRAM

## 2022-12-09 PROCEDURE — 87880 STREP A ASSAY W/OPTIC: CPT | Performed by: STUDENT IN AN ORGANIZED HEALTH CARE EDUCATION/TRAINING PROGRAM

## 2022-12-09 PROCEDURE — 99213 OFFICE O/P EST LOW 20 MIN: CPT | Mod: CS | Performed by: STUDENT IN AN ORGANIZED HEALTH CARE EDUCATION/TRAINING PROGRAM

## 2022-12-09 PROCEDURE — 87426 SARSCOV CORONAVIRUS AG IA: CPT | Performed by: STUDENT IN AN ORGANIZED HEALTH CARE EDUCATION/TRAINING PROGRAM

## 2022-12-09 RX ORDER — BENZONATATE 100 MG/1
100 CAPSULE ORAL 3 TIMES DAILY PRN
Qty: 60 CAPSULE | Refills: 0 | Status: SHIPPED | OUTPATIENT
Start: 2022-12-09 | End: 2023-02-25

## 2022-12-09 RX ORDER — SULFAMETHOXAZOLE AND TRIMETHOPRIM 800; 160 MG/1; MG/1
1 TABLET ORAL 2 TIMES DAILY
Qty: 6 TABLET | Refills: 0 | Status: SHIPPED | OUTPATIENT
Start: 2022-12-09 | End: 2023-02-25

## 2022-12-09 ASSESSMENT — FIBROSIS 4 INDEX: FIB4 SCORE: 1.35

## 2022-12-09 NOTE — TELEPHONE ENCOUNTER
Notified the patient with her urine culture result.  UTI E. coli, sensitive to all antibiotics, except intermediate to Cipro, she has had Bactrim before.  We will send prescription for Bactrim DS 1 p.o. twice daily x3 days quantity 6 tablets to [facility].  Encourage adequate hydration with water.

## 2022-12-09 NOTE — PROGRESS NOTES
"Subjective:     CC: Uri symptoms    HPI:   Ramona presents today with cough, congestion, fever, sore throat and headaches for 48 hours.  She states that she has been sick since Thanksgiving, but did feel like she was almost improved.  At that time she got COVID and flu testing that was negative.  She is not sure if she was around anyone sick, but has been mostly staying in the house since Thanksgiving.  She was in the hospital waiting room few days ago because her  had knee surgery.  She has been using Mucinex and Tylenol with minimal relief.      ROS:  ROS    Objective:     Exam:  BP (P) 126/72 (BP Location: Left arm, Patient Position: Sitting, BP Cuff Size: Adult)   Pulse (P) 79   Temp (P) 37.2 °C (98.9 °F) (Temporal)   Resp (P) 16   Ht (P) 1.651 m (5' 5\")   Wt (P) 86.2 kg (190 lb)   LMP 10/10/2010   SpO2 (P) 95%   BMI (P) 31.62 kg/m²  Body mass index is 31.62 kg/m² (pended).    Physical Exam  Vitals reviewed.   Constitutional:       General: She is not in acute distress.     Appearance: She is normal weight. She is not toxic-appearing.   HENT:      Head: Normocephalic and atraumatic.      Right Ear: Tympanic membrane, ear canal and external ear normal. There is no impacted cerumen.      Left Ear: Tympanic membrane, ear canal and external ear normal. There is no impacted cerumen.      Nose: Nose normal. No congestion.      Mouth/Throat:      Mouth: Mucous membranes are moist.      Pharynx: Oropharynx is clear. Posterior oropharyngeal erythema present. No oropharyngeal exudate.   Eyes:      General:         Right eye: No discharge.         Left eye: No discharge.      Extraocular Movements: Extraocular movements intact.      Conjunctiva/sclera: Conjunctivae normal.   Cardiovascular:      Rate and Rhythm: Normal rate and regular rhythm.      Pulses: Normal pulses.      Heart sounds: Normal heart sounds. No murmur heard.  Pulmonary:      Effort: Pulmonary effort is normal. No respiratory distress.      " Breath sounds: Normal breath sounds.   Abdominal:      General: Abdomen is flat. Bowel sounds are normal. There is no distension.      Palpations: Abdomen is soft.      Tenderness: There is no abdominal tenderness.   Musculoskeletal:         General: Normal range of motion.   Lymphadenopathy:      Cervical: Cervical adenopathy (left sided) present.   Skin:     General: Skin is warm and dry.      Capillary Refill: Capillary refill takes less than 2 seconds.   Neurological:      Mental Status: She is alert.   Psychiatric:         Mood and Affect: Mood normal.         Behavior: Behavior normal.         Thought Content: Thought content normal.         Judgment: Judgment normal.         Assessment & Plan:     61 y.o. female with the following -     1. COVID-19  Been positive.  Flu testing negative.  Discussed the risks and benefits of starting an antiviral.  Patient accepts the risks and would like to start.   - POCT Influenza A/B  - POCT SARS-COV Antigen SANTANA (Symptomatic only)  - Nirmatrelvir&Ritonavir 300/100 20 x 150 MG & 10 x 100MG Tablet Therapy Pack; Take 300 mg nirmatrelvir (two 150 mg tablets) with 100 mg ritonavir (one 100 mg tablet) by mouth, with all three tablets taken together twice daily for 5 days.  Dispense: 30 Each; Refill: 0    2. Pharyngitis, unspecified etiology  POCT strep negative, advised patient to use Advil and Tylenol to help with symptoms  - POCT Rapid Strep A    3. Acute cough  Patient does not get relief with over-the-counter cough medicines typically, Tessalon sent to pharmacy  - benzonatate (TESSALON) 100 MG Cap; Take 1 Capsule by mouth 3 times a day as needed for Cough.  Dispense: 60 Capsule; Refill: 0    No follow-ups on file.    Please note that this dictation was created using voice recognition software. I have made every reasonable attempt to correct obvious errors, but I expect that there are errors of grammar and possibly content that I did not discover before finalizing the  note.

## 2022-12-11 PROBLEM — M17.10 ARTHRITIS OF KNEE: Status: ACTIVE | Noted: 2022-12-11

## 2022-12-11 PROBLEM — Z86.0100 HISTORY OF COLON POLYPS: Status: RESOLVED | Noted: 2019-08-06 | Resolved: 2022-12-11

## 2022-12-11 PROBLEM — M76.71 PERONEAL TENDINITIS OF RIGHT LOWER EXTREMITY: Status: RESOLVED | Noted: 2022-10-13 | Resolved: 2022-12-11

## 2022-12-11 PROBLEM — Z86.010 HISTORY OF COLON POLYPS: Status: RESOLVED | Noted: 2019-08-06 | Resolved: 2022-12-11

## 2023-02-25 PROBLEM — Z87.440 HISTORY OF UTI: Status: ACTIVE | Noted: 2019-12-26

## 2023-02-27 SDOH — HEALTH STABILITY: MENTAL HEALTH
STRESS IS WHEN SOMEONE FEELS TENSE, NERVOUS, ANXIOUS, OR CAN'T SLEEP AT NIGHT BECAUSE THEIR MIND IS TROUBLED. HOW STRESSED ARE YOU?: VERY MUCH

## 2023-02-27 SDOH — ECONOMIC STABILITY: TRANSPORTATION INSECURITY
IN THE PAST 12 MONTHS, HAS LACK OF RELIABLE TRANSPORTATION KEPT YOU FROM MEDICAL APPOINTMENTS, MEETINGS, WORK OR FROM GETTING THINGS NEEDED FOR DAILY LIVING?: NO

## 2023-02-27 SDOH — ECONOMIC STABILITY: TRANSPORTATION INSECURITY
IN THE PAST 12 MONTHS, HAS LACK OF TRANSPORTATION KEPT YOU FROM MEETINGS, WORK, OR FROM GETTING THINGS NEEDED FOR DAILY LIVING?: NO

## 2023-02-27 SDOH — ECONOMIC STABILITY: HOUSING INSECURITY
IN THE LAST 12 MONTHS, WAS THERE A TIME WHEN YOU DID NOT HAVE A STEADY PLACE TO SLEEP OR SLEPT IN A SHELTER (INCLUDING NOW)?: NO

## 2023-02-27 SDOH — ECONOMIC STABILITY: INCOME INSECURITY: IN THE LAST 12 MONTHS, WAS THERE A TIME WHEN YOU WERE NOT ABLE TO PAY THE MORTGAGE OR RENT ON TIME?: NO

## 2023-02-27 SDOH — ECONOMIC STABILITY: FOOD INSECURITY: WITHIN THE PAST 12 MONTHS, YOU WORRIED THAT YOUR FOOD WOULD RUN OUT BEFORE YOU GOT MONEY TO BUY MORE.: NEVER TRUE

## 2023-02-27 SDOH — ECONOMIC STABILITY: FOOD INSECURITY: WITHIN THE PAST 12 MONTHS, THE FOOD YOU BOUGHT JUST DIDN'T LAST AND YOU DIDN'T HAVE MONEY TO GET MORE.: NEVER TRUE

## 2023-02-27 SDOH — HEALTH STABILITY: PHYSICAL HEALTH: ON AVERAGE, HOW MANY MINUTES DO YOU ENGAGE IN EXERCISE AT THIS LEVEL?: 70 MIN

## 2023-02-27 SDOH — ECONOMIC STABILITY: HOUSING INSECURITY: IN THE LAST 12 MONTHS, HOW MANY PLACES HAVE YOU LIVED?: 1

## 2023-02-27 SDOH — HEALTH STABILITY: PHYSICAL HEALTH: ON AVERAGE, HOW MANY DAYS PER WEEK DO YOU ENGAGE IN MODERATE TO STRENUOUS EXERCISE (LIKE A BRISK WALK)?: 5 DAYS

## 2023-02-27 SDOH — ECONOMIC STABILITY: TRANSPORTATION INSECURITY
IN THE PAST 12 MONTHS, HAS THE LACK OF TRANSPORTATION KEPT YOU FROM MEDICAL APPOINTMENTS OR FROM GETTING MEDICATIONS?: NO

## 2023-02-27 ASSESSMENT — SOCIAL DETERMINANTS OF HEALTH (SDOH)
HOW OFTEN DO YOU ATTEND CHURCH OR RELIGIOUS SERVICES?: NEVER
HOW OFTEN DO YOU HAVE A DRINK CONTAINING ALCOHOL: NEVER
DO YOU BELONG TO ANY CLUBS OR ORGANIZATIONS SUCH AS CHURCH GROUPS UNIONS, FRATERNAL OR ATHLETIC GROUPS, OR SCHOOL GROUPS?: NO
HOW OFTEN DO YOU GET TOGETHER WITH FRIENDS OR RELATIVES?: PATIENT DECLINED
DO YOU BELONG TO ANY CLUBS OR ORGANIZATIONS SUCH AS CHURCH GROUPS UNIONS, FRATERNAL OR ATHLETIC GROUPS, OR SCHOOL GROUPS?: NO
IN A TYPICAL WEEK, HOW MANY TIMES DO YOU TALK ON THE PHONE WITH FAMILY, FRIENDS, OR NEIGHBORS?: ONCE A WEEK
HOW MANY DRINKS CONTAINING ALCOHOL DO YOU HAVE ON A TYPICAL DAY WHEN YOU ARE DRINKING: PATIENT DOES NOT DRINK
HOW OFTEN DO YOU ATTEND CHURCH OR RELIGIOUS SERVICES?: NEVER
HOW OFTEN DO YOU HAVE SIX OR MORE DRINKS ON ONE OCCASION: NEVER
HOW OFTEN DO YOU GET TOGETHER WITH FRIENDS OR RELATIVES?: PATIENT DECLINED
IN A TYPICAL WEEK, HOW MANY TIMES DO YOU TALK ON THE PHONE WITH FAMILY, FRIENDS, OR NEIGHBORS?: ONCE A WEEK
HOW OFTEN DO YOU ATTENT MEETINGS OF THE CLUB OR ORGANIZATION YOU BELONG TO?: NEVER
WITHIN THE PAST 12 MONTHS, YOU WORRIED THAT YOUR FOOD WOULD RUN OUT BEFORE YOU GOT THE MONEY TO BUY MORE: NEVER TRUE
HOW OFTEN DO YOU ATTENT MEETINGS OF THE CLUB OR ORGANIZATION YOU BELONG TO?: NEVER

## 2023-02-27 ASSESSMENT — LIFESTYLE VARIABLES
HOW OFTEN DO YOU HAVE A DRINK CONTAINING ALCOHOL: NEVER
HOW MANY STANDARD DRINKS CONTAINING ALCOHOL DO YOU HAVE ON A TYPICAL DAY: PATIENT DOES NOT DRINK
SKIP TO QUESTIONS 9-10: 1
HOW OFTEN DO YOU HAVE SIX OR MORE DRINKS ON ONE OCCASION: NEVER
AUDIT-C TOTAL SCORE: 0

## 2023-03-02 ENCOUNTER — OFFICE VISIT (OUTPATIENT)
Dept: MEDICAL GROUP | Facility: MEDICAL CENTER | Age: 63
End: 2023-03-02
Payer: COMMERCIAL

## 2023-03-02 DIAGNOSIS — E66.9 CLASS 1 OBESITY WITHOUT SERIOUS COMORBIDITY WITH BODY MASS INDEX (BMI) OF 34.0 TO 34.9 IN ADULT, UNSPECIFIED OBESITY TYPE: ICD-10-CM

## 2023-03-02 DIAGNOSIS — Z86.16 HISTORY OF COVID-19: ICD-10-CM

## 2023-03-02 DIAGNOSIS — G47.30 SLEEP APNEA, UNSPECIFIED TYPE: ICD-10-CM

## 2023-03-02 DIAGNOSIS — Z00.00 PREVENTATIVE HEALTH CARE: ICD-10-CM

## 2023-03-02 DIAGNOSIS — F32.A DEPRESSION, UNSPECIFIED DEPRESSION TYPE: ICD-10-CM

## 2023-03-02 PROCEDURE — 99396 PREV VISIT EST AGE 40-64: CPT | Performed by: INTERNAL MEDICINE

## 2023-03-02 RX ORDER — SEMAGLUTIDE 0.25 MG/.5ML
0.25 INJECTION, SOLUTION SUBCUTANEOUS
Qty: 4 EACH | Refills: 1 | Status: SHIPPED | OUTPATIENT
Start: 2023-03-02 | End: 2023-11-21

## 2023-03-02 ASSESSMENT — PATIENT HEALTH QUESTIONNAIRE - PHQ9
SUM OF ALL RESPONSES TO PHQ QUESTIONS 1-9: 10
5. POOR APPETITE OR OVEREATING: 0 - NOT AT ALL
CLINICAL INTERPRETATION OF PHQ2 SCORE: 4

## 2023-03-02 ASSESSMENT — FIBROSIS 4 INDEX: FIB4 SCORE: 1.37

## 2023-03-02 NOTE — PROGRESS NOTES
Subjective     Ramona Wilson is a 62 y.o. female who presents annual           HPI    Here for annual exam   Medications, allergies, medical history, surgical history, social history, family history  reviewed and updated  Sees orthopedic dr.shukla Yanique ortez from psychiatry remains on Prozac and Wellbutrin  Sees sleep group  Doing stationary bike 4.5 miles daily for 20 minutes, but does not bother her knee as she has a history of right knee arthritis status post knee injection by orthopedics most recently in October.  Pain level improved after the initial injection going from level 7 down to 0 point has recurred a bit but she is still able to do ADLs and exercise without significant pain.  Etoh none   History of covid december and recovered, she did not have any significant cough or shortness of breath, no change in taste or smell, she took a break from exercising.  No has been working on nutrition and limiting sweets in her diet, she has tried phentermine 15 mg with no benefit although it does make her jittery at times.  Medications, allergies, medical history, surgical history, social history, family history  reviewed and updated        Current Outpatient Medications   Medication Sig Dispense Refill    meloxicam (MOBIC) 7.5 MG Tab Take 1 Tablet by mouth every day. 30 Tablet 1    buPROPion SR (WELLBUTRIN-SR) 150 MG TABLET SR 12 HR sustained-release tablet       FLUoxetine (PROZAC) 20 MG Cap Take 1 Capsule by mouth every day. 30 Capsule 5    hydrOXYzine HCl (ATARAX) 10 MG Tab       fluoxetine (PROZAC) 40 MG capsule Take 2 Capsules by mouth every day. 30 capsule 1    clobetasol (TEMOVATE) 0.05 % external solution APPLY 1 APPLICATION TO AFFECTED AREA(S) 2 TIMES A DAY. 50 mL 5    Omega-3 Fatty Acids (FISH OIL) 1000 MG Cap capsule Take 1,000 mg by mouth 3 times a day, with meals.      Cholecalciferol (VITAMIN D) 1000 UNIT CAPS Take  by mouth every day.       No current facility-administered medications for  this visit.      Patient Active Problem List   Diagnosis    History of low back pain    Dyslipidemia    Depression    Preventative health care    Sleep apnea    S/P cholecystectomy    Foot arthritis    Eagle's syndrome    History of elbow fracture, left    Abnormal liver function    Obesity    Impaired fasting glucose    History of UTI    History of c.difficile colitis    History of shoulder pain    Knee arthritis       Depression Screening  Little interest or pleasure in doing things?  2 - more than half the days  Feeling down, depressed , or hopeless? 2 - more than half the days  Trouble falling or staying asleep, or sleeping too much?  0 - not at all  Feeling tired or having little energy?  0 - not at all  Poor appetite or overeating?  0 - not at all  Feeling bad about yourself - or that you are a failure or have let yourself or your family down? 3 - nearly every day  Trouble concentrating on things, such as reading the newspaper or watching television? 3 - nearly every day  Moving or speaking so slowly that other people could have noticed.  Or the opposite - being so fidgety or restless that you have been moving around a lot more than usual?  0 - not at all  Thoughts that you would be better off dead, or of hurting yourself?  0 - not at all  Patient Health Questionnaire Score: 10           Patient Care Team:  Jordan Rand M.D. as PCP - [facility] ([facility] Supp[facility])         ROS           Objective     LMP 10/10/2010      Physical Exam  Vitals and nursing note reviewed.   Constitutional:       Appearance: Normal appearance.   HENT:      Head: Normocephalic and atraumatic.      Right Ear: External ear normal.      Left Ear: External ear normal.   Eyes:      Conjunctiva/sclera: Conjunctivae normal.   Cardiovascular:      Rate and Rhythm: Normal rate and regular rhythm.      Heart sounds: Normal heart sounds.   Pulmonary:      Effort: Pulmonary effort is normal.      Breath sounds: Normal breath sounds.    Abdominal:      General: There is no distension.   Musculoskeletal:         General: No swelling.   Skin:     Findings: No bruising.   Neurological:      General: No focal deficit present.      Mental Status: She is alert.   Psychiatric:         Mood and Affect: Mood normal.         Behavior: Behavior normal.                  Assessment & Plan        Assessment  #1 annual exam     #2 sleep apnea 2/9/22 sleep note on cpap 7, current machine is 7 years old but working well, follow-up 1 year new machine will be ordered at that time      #3 knee arthritis followed by orthopedics, last x-ray 2022, right knee steroid injection performed in October    #4 dyslipidemia diet controlled    #5 depression PHQ 10 followed by psychiatry on prozac and wellbutrin     #6 BMI 33.3 Obesity on phentermine in the past which was not effective and caused some tremors    #7 postmenopausal bone loss 9/3/21 dexa LS-1.2,hip+0.1    #8 sleep apnea on cpap      Plan  #1  Health maintenance reviewed and updated    #2 sleep apnea follow-up referral    #3 labs    #4 follow-up psychiatry    #5 up-to-date on her colonoscopy done 2/7/23 colon per [facility] negative repeat 10 years    #6 follow-up with one of our female providers for Pap smear    #7 since she has tried phentermine already and not been successful and experience some side effects, we will try Wegovy 0.25 mg weekly, medication may cause nausea, bloating, abdominal pain, reflux, we shall see if this medication is covered on her plan    #8 if Wegovy is covered titrate upward slowly each month    #9 continue her good nutrition and exercise program    #10 up-to-date on her mammogram, repeat bone density test later this year

## 2023-03-03 VITALS
OXYGEN SATURATION: 94 % | BODY MASS INDEX: 33.12 KG/M2 | HEART RATE: 91 BPM | WEIGHT: 194 LBS | SYSTOLIC BLOOD PRESSURE: 128 MMHG | DIASTOLIC BLOOD PRESSURE: 78 MMHG | TEMPERATURE: 97.6 F | HEIGHT: 64 IN

## 2023-03-09 ENCOUNTER — APPOINTMENT (OUTPATIENT)
Dept: MEDICAL GROUP | Facility: MEDICAL CENTER | Age: 63
End: 2023-03-09
Payer: COMMERCIAL

## 2023-03-12 ENCOUNTER — TELEPHONE (OUTPATIENT)
Dept: MEDICAL GROUP | Facility: MEDICAL CENTER | Age: 63
End: 2023-03-12
Payer: COMMERCIAL

## 2023-03-13 ENCOUNTER — TELEPHONE (OUTPATIENT)
Dept: HEALTH INFORMATION MANAGEMENT | Facility: OTHER | Age: 63
End: 2023-03-13
Payer: COMMERCIAL

## 2023-03-13 NOTE — TELEPHONE ENCOUNTER
Need PAR for   (1) wegovy 0.25 mg/0.5 ml weekly injection subcutaneous every 7 days  (2) 4 pens for 30 days  (3) diagnosis: BMI 33.3  (4) ICD 10: z68.33  (5) tried and failed phentermine

## 2023-03-16 ENCOUNTER — HOSPITAL ENCOUNTER (OUTPATIENT)
Facility: MEDICAL CENTER | Age: 63
End: 2023-03-16
Attending: PHYSICIAN ASSISTANT
Payer: COMMERCIAL

## 2023-03-16 ENCOUNTER — OFFICE VISIT (OUTPATIENT)
Dept: MEDICAL GROUP | Facility: MEDICAL CENTER | Age: 63
End: 2023-03-16
Payer: COMMERCIAL

## 2023-03-16 VITALS
SYSTOLIC BLOOD PRESSURE: 122 MMHG | TEMPERATURE: 97.9 F | OXYGEN SATURATION: 95 % | RESPIRATION RATE: 18 BRPM | DIASTOLIC BLOOD PRESSURE: 80 MMHG | BODY MASS INDEX: 33.8 KG/M2 | HEIGHT: 64 IN | WEIGHT: 198 LBS | HEART RATE: 71 BPM

## 2023-03-16 DIAGNOSIS — N39.3 STRESS INCONTINENCE: ICD-10-CM

## 2023-03-16 DIAGNOSIS — Z01.419 ENCOUNTER FOR ANNUAL ROUTINE GYNECOLOGICAL EXAMINATION: ICD-10-CM

## 2023-03-16 PROCEDURE — 88175 CYTOPATH C/V AUTO FLUID REDO: CPT

## 2023-03-16 PROCEDURE — 99213 OFFICE O/P EST LOW 20 MIN: CPT | Mod: 25 | Performed by: PHYSICIAN ASSISTANT

## 2023-03-16 PROCEDURE — 87624 HPV HI-RISK TYP POOLED RSLT: CPT

## 2023-03-16 PROCEDURE — 99396 PREV VISIT EST AGE 40-64: CPT | Performed by: PHYSICIAN ASSISTANT

## 2023-03-16 RX ORDER — TOLTERODINE 2 MG/1
2 CAPSULE, EXTENDED RELEASE ORAL DAILY
Qty: 30 CAPSULE | Refills: 3 | Status: SHIPPED | OUTPATIENT
Start: 2023-03-16 | End: 2023-04-17

## 2023-03-16 RX ORDER — HYDROCODONE BITARTRATE AND ACETAMINOPHEN 7.5; 325 MG/1; MG/1
TABLET ORAL
COMMUNITY
Start: 2023-03-14 | End: 2023-11-21

## 2023-03-16 RX ORDER — BUPROPION HYDROCHLORIDE 300 MG/1
300 TABLET ORAL EVERY MORNING
COMMUNITY
Start: 2023-03-07

## 2023-03-16 ASSESSMENT — FIBROSIS 4 INDEX: FIB4 SCORE: 1.37

## 2023-03-16 NOTE — ASSESSMENT & PLAN NOTE
New and unstable  Symptoms are consistent with stress incontinence given the fact that it occurs when she is bending over or sneezing.  She had 3 vaginal births and 1 .  Duloxetine would be initial choice but she is already on SSRI therefore I will do low-dose Detrol and have her follow-up with PCP

## 2023-03-16 NOTE — ASSESSMENT & PLAN NOTE
Pleasant 62-year-old female is here for annual GYN.  Last Pap 3 years ago, unremarkable no known history of abnormalities.  Not sexually active but  and has been monogamous with 1 male partner.  3 vaginal births 1 .  No known history of reproductive cancers.  Up-to-date on mammogram.  PCP ordered.  No complaints

## 2023-03-16 NOTE — PROGRESS NOTES
Subjective:   Ramona Wilson is a 62 y.o. female here today for PAP    Stress incontinence  New and unstable  Symptoms are consistent with stress incontinence given the fact that it occurs when she is bending over or sneezing.  She had 3 vaginal births and 1 .  Duloxetine would be initial choice but she is already on SSRI therefore I will do low-dose Detrol and have her follow-up with PCP    Encounter for annual routine gynecological examination  Pleasant 62-year-old female is here for annual GYN.  Last Pap 3 years ago, unremarkable no known history of abnormalities.  Not sexually active but  and has been monogamous with 1 male partner.  3 vaginal births 1 .  No known history of reproductive cancers.  Up-to-date on mammogram.  PCP ordered.  No complaints           Current medicines (including changes today)  Current Outpatient Medications   Medication Sig Dispense Refill    tolterodine ER (DETROL-LA) 2 MG CAPSULE SR 24 HR Take 1 Capsule by mouth every day. 30 Capsule 3    HYDROcodone-acetaminophen (NORCO) 7.5-325 MG tab       buPROPion (WELLBUTRIN XL) 300 MG XL tablet Take 300 mg by mouth every morning.      Semaglutide (WEGOVY) 0.25 MG/0.5ML Solution Auto-injector Pen-injector Inject 0.5 mL under the skin every 7 days. 4 Each 1    meloxicam (MOBIC) 7.5 MG Tab Take 1 Tablet by mouth every day. 30 Tablet 1    FLUoxetine (PROZAC) 20 MG Cap Take 1 Capsule by mouth every day. 30 Capsule 5    hydrOXYzine HCl (ATARAX) 10 MG Tab       clobetasol (TEMOVATE) 0.05 % external solution APPLY 1 APPLICATION TO AFFECTED AREA(S) 2 TIMES A DAY. 50 mL 5    Omega-3 Fatty Acids (FISH OIL) 1000 MG Cap capsule Take 1,000 mg by mouth 3 times a day, with meals.      Cholecalciferol (VITAMIN D) 1000 UNIT CAPS Take  by mouth every day.       No current facility-administered medications for this visit.     She  has a past medical history of Anxiety (2009), Blood in urine, Chickenpox, Chronic low back pain  "(4/21/2009), Constipation, Depression, Diarrhea, Dyslipidemia (4/21/2009), Eczema (4/21/2009), Hypovitaminosis D (4/23/2009), Obesity, RLS (restless legs syndrome), Sleep apnea, Surveillance for birth control, oral contraceptives (4/21/2009), and Wears glasses.  Amoxicillin and Other drug     Social History and Family History were reviewed and updated.    ROS   No headaches, chest pain, no shortness of breath, abdominal pain, nausea, or vomiting.  All other systems were reviewed and are negative or noted as positive in the HPI.       Objective:     /80 (BP Location: Left arm, Patient Position: Sitting, BP Cuff Size: Large adult)   Pulse 71   Temp 36.6 °C (97.9 °F) (Temporal)   Resp 18   Ht 1.626 m (5' 4\")   Wt 89.8 kg (198 lb)   SpO2 95%  Body mass index is 33.99 kg/m².     Physical Exam:  Constitutional: ANO x3, no acute distress, well-nourished, well-hydrated   Skin: Warm, dry, good turgor, no rashes in visible areas.    Breast examination: Normal breasts bilaterally.  Deferred by patient mL today    Genitalia: Normal external genitalia without masses or lesions noted.  Normal vaginal vault.  There is no abnormal vaginal discharge noted.  Cervix is multiparous.  Cervix is nonfriable.  On manual examination.  There is no cervical motion tenderness noted.  Uterus and adnexa are nonpalpable.    Clinical Course/Lab Analysis:        Assessment and Plan:   The following treatment plan was discussed.  Signs and symptoms for which to return were discussed with patient at length.  Patient verbalized understanding.    1. Encounter for annual routine gynecological examination  Ordered.    2. Stress incontinence  Chronic and Unstable  Discussed that her symptoms are consistent with stress.  Duloxetine probably would be a better choice but she is on fluoxetine.  I Rosa start her on low-dose that I will have her follow-up with PCP in 4 weeks.  - tolterodine ER (DETROL-LA) 2 MG CAPSULE SR 24 HR; Take 1 Capsule by " mouth every day.  Dispense: 30 Capsule; Refill: 3    Other orders  - HYDROcodone-acetaminophen (NORCO) 7.5-325 MG tab  - buPROPion (WELLBUTRIN XL) 300 MG XL tablet; Take 300 mg by mouth every morning.       Anticipatory guidance discussed  Followup:1 month    Please note that this dictation was created using voice recognition software. I have made every reasonable attempt to correct obvious errors, but I expect that there are errors of grammar and possibly content that I did not discover before finalizing the note.

## 2023-03-17 ENCOUNTER — OFFICE VISIT (OUTPATIENT)
Dept: SLEEP MEDICINE | Facility: MEDICAL CENTER | Age: 63
End: 2023-03-17
Attending: PREVENTIVE MEDICINE
Payer: COMMERCIAL

## 2023-03-17 VITALS
HEART RATE: 74 BPM | SYSTOLIC BLOOD PRESSURE: 142 MMHG | OXYGEN SATURATION: 93 % | HEIGHT: 65 IN | RESPIRATION RATE: 16 BRPM | DIASTOLIC BLOOD PRESSURE: 90 MMHG | WEIGHT: 197 LBS | BODY MASS INDEX: 32.82 KG/M2

## 2023-03-17 DIAGNOSIS — Z01.419 ENCOUNTER FOR ANNUAL ROUTINE GYNECOLOGICAL EXAMINATION: ICD-10-CM

## 2023-03-17 DIAGNOSIS — Z99.89 USES CONTINUOUS POSITIVE AIRWAY PRESSURE (CPAP) VENTILATION AT HOME: ICD-10-CM

## 2023-03-17 DIAGNOSIS — G47.33 OSA ON CPAP: ICD-10-CM

## 2023-03-17 PROCEDURE — 99212 OFFICE O/P EST SF 10 MIN: CPT | Performed by: PREVENTIVE MEDICINE

## 2023-03-17 PROCEDURE — 99214 OFFICE O/P EST MOD 30 MIN: CPT | Performed by: PREVENTIVE MEDICINE

## 2023-03-17 ASSESSMENT — FIBROSIS 4 INDEX: FIB4 SCORE: 1.37

## 2023-03-17 NOTE — PROGRESS NOTES
CHIEF COMPLAINT: Compliance check/Annual Visit  LAST SEEN: Dr. Torres on 2/9/22  HISTORY OF PRESENT ILLNESS:  Ramona Wilson is a 62 y.o.female  who is here today to follow-up.  She has been using a nasal mask and it has been irritating the exterior anterior skin.     COMPLIANCE DATA greater than 4 hours:87%  Machine type: Airsense 10 Autoset   Date range: 02/15/23-03/16/23  AHI:9.1  TIME USED: 8hrs 16mins   PRESSURE SETTINGS: min 10 and max 18  LEAK:30L/min   MACHINE AGE:6 years    This patient is using PAP therapy consistently and is benefiting from it . Mask problems       Significant comorbidities and modifying factors: see below    PAST MEDICAL HISTORY:  Past Medical History:   Diagnosis Date    Anxiety 4/21/2009    Blood in urine     Chickenpox     Chronic low back pain 4/21/2009    Constipation     Depression     Diarrhea     Dyslipidemia 4/21/2009    Eczema 4/21/2009    Hypovitaminosis D 4/23/2009    Obesity     RLS (restless legs syndrome)     Sleep apnea     Surveillance for birth control, oral contraceptives 4/21/2009    Wears glasses       PROBLEM LIST:  Patient Active Problem List    Diagnosis Date Noted    Stress incontinence 03/16/2023    Encounter for annual routine gynecological examination 03/16/2023    History of COVID-19 03/02/2023    Knee arthritis 12/11/2022    History of shoulder pain 08/09/2021    History of c.difficile colitis 03/30/2020    History of UTI 12/26/2019    Impaired fasting glucose 02/15/2017    Obesity 09/30/2015    Abnormal liver function 09/17/2015    History of elbow fracture, left 03/30/2015    [place]'s syndrome 06/14/2014    Foot arthritis 10/08/2013    S/P cholecystectomy 12/07/2010    Sleep apnea 10/15/2010    History of low back pain 04/21/2009    Dyslipidemia 04/21/2009    Depression 04/21/2009    Preventative health care 04/21/2009     PAST SOCIAL HISTORY:  Past Surgical History:   Procedure Laterality Date    BREAST BIOPSY Right 08/12/2021    benign    ME BREAST  REDUCTION  2011    MAMMOPLASTY REDUCTION  2010    Performed by LUANNE LARRY at SURGERY [facility] ORS    PRIMARY C SECTION  1994        MOSHE BY LAPAROSCOPY       PAST FAMILY HISTORY:  Family History   Problem Relation Age of Onset    Cancer Mother         lung cancer,osteoporosis    Hypertension Brother     Heart Disease Brother     Alcohol abuse Brother     Diabetes Father     Lung Disease Father     Sleep Apnea Father     Cancer Maternal Aunt     Cancer Maternal Grandmother     Alcohol/Drug Maternal Grandfather     Stroke Paternal Grandfather     Cancer Maternal Aunt      SOCIAL HISTORY:  Social History     Socioeconomic History    Marital status:      Spouse name: Not on file    Number of children: Not on file    Years of education: Not on file    Highest education level: 12th grade   Occupational History    Not on file   Tobacco Use    Smoking status: Never    Smokeless tobacco: Never   Vaping Use    Vaping Use: Never used   Substance and Sexual Activity    Alcohol use: No     Alcohol/week: 0.0 oz    Drug use: No    Sexual activity: Yes     Partners: Male   Other Topics Concern    Not on file   Social History Narrative    Not on file     Social Determinants of Health     Financial Resource Strain: Not on file   Food Insecurity: No Food Insecurity    Worried About Running Out of Food in the Last Year: Never true    Ran Out of Food in the Last Year: Never true   Transportation Needs: No Transportation Needs    Lack of Transportation (Medical): No    Lack of Transportation (Non-Medical): No   Physical Activity: Sufficiently Active    Days of Exercise per Week: 5 days    Minutes of Exercise per Session: 70 min   Stress: Stress Concern Present    Feeling of Stress : Very much   Social Connections: Unknown    Frequency of Communication with Friends and Family: Once a week    Frequency of Social Gatherings with Friends and Family: Patient refused    Attends Advent Services: Never  "   Active Member of Clubs or Organizations: No    Attends Club or Organization Meetings: Never    Marital Status:    Intimate Partner Violence: Not on file   Housing Stability: Low Risk     Unable to Pay for Housing in the Last Year: No    Number of Places Lived in the Last Year: 1    Unstable Housing in the Last Year: No     ALLERGIES: Amoxicillin and Other drug  MEDICATIONS:  Current Outpatient Medications   Medication Sig Dispense Refill    HYDROcodone-acetaminophen (NORCO) 7.5-325 MG tab       buPROPion (WELLBUTRIN XL) 300 MG XL tablet Take 300 mg by mouth every morning.      tolterodine ER (DETROL-LA) 2 MG CAPSULE SR 24 HR Take 1 Capsule by mouth every day. 30 Capsule 3    Semaglutide (WEGOVY) 0.25 MG/0.5ML Solution Auto-injector Pen-injector Inject 0.5 mL under the skin every 7 days. 4 Each 1    meloxicam (MOBIC) 7.5 MG Tab Take 1 Tablet by mouth every day. 30 Tablet 1    FLUoxetine (PROZAC) 20 MG Cap Take 1 Capsule by mouth every day. 30 Capsule 5    hydrOXYzine HCl (ATARAX) 10 MG Tab       clobetasol (TEMOVATE) 0.05 % external solution APPLY 1 APPLICATION TO AFFECTED AREA(S) 2 TIMES A DAY. 50 mL 5    Omega-3 Fatty Acids (FISH OIL) 1000 MG Cap capsule Take 1,000 mg by mouth 3 times a day, with meals.      Cholecalciferol (VITAMIN D) 1000 UNIT CAPS Take  by mouth every day.       No current facility-administered medications for this visit.    \"CURRENT RX\"    REVIEW OF SYSTEMS:  Constitutional: Denies weight loss  Eyes: Denies vision changes  Ears/Nose/Mouth/Throat: Denies rhinitis/nasal congestion, injury, decayed teeth/toothaches.  Cardiovascular: Denies chest pain, tightness, palpitations, swelling in legs/feet, difficulty breathing when lying down but gets better when sitting up.   Respiratory: Denies shortness of breath while awake,  Sleep: per HPI  Gastrointestinal: Denies  difficulty swallowing,  heartburn.  Genitourinary: REPORTS nocturia  Musculoskeletal: Denies painful joints, sore muscles, " "back pain.   Neurological: Denies frequent headaches,weakness, dizziness.    PHYSICAL EXAM/VITALS:  BP (!) 142/90 (BP Location: Left arm, Patient Position: Sitting, BP Cuff Size: Large adult)   Pulse 74   Resp 16   Ht 1.651 m (5' 5\")   Wt 89.4 kg (197 lb)   LMP 10/10/2010   SpO2 93%   BMI 32.78 kg/m²   Appearance: Well-nourished, well-developed,  looks stated age, no acute distress  Eyes:   EOMI  ENMT: MASKED   Neck: Supple, trachea midline  Respiratory effort:  No intercostal retractions or use of accessory muscles  Musculoskeletal:  Grossly normal; gait and station normal  Neurologic:  oriented to person, time, place, and purpose; judgement intact  Psychiatric:  No depression, anxiety, agitation    MEDICAL DECISION MAKING:  The medical record was reviewed as it pertains to this referral. This includes records from primary care,consultants notes, referral request, hospital records, labs and imaging. Any available diagnostic and titration nocturnal polysomnograms, home sleep apnea tests, continuous nocturnal oximetry results, multiple sleep latency tests, and recent compliance reports were reviewed with the patient.    ASSESSMENT/PLAN:  Ramona Wilson is a 62 y.o.female who is doing well on PAP therapy. A new machine will be ordered for her today. Also pressure changes were made today. Her min changed from 10 to 12 to address elevated AHI. Also it was recommended to use Ayr nasal saline gel for the irritated portions of the mucosa and to use neosporin for the external irritation. Patient is aware she should not have ointment on her nose if she is using a nasal mask. She will be offered a mask fitting session. She was made aware of hypafix tape if she elects to use a nasal mask.  DME [facility]     1. SAMARA on CPAP  - DME CPAP  - DME Mask Fitting; Future    2. Uses continuous positive airway pressure (CPAP) ventilation at home  - DME CPAP  - DME Mask Fitting; Future      MEETING INSURANCE COMPLIANCE REQUIREMENTS " and MISC tips:     The patient has 90 days in order to be deemed compliant by the insurance company.  The 90-day starts the day that he receives the machine and supplies from the [facility].  It is during this period of time that the patient must demonstrate a consistent use of pap (greater than 4 hours/day for a minimum of 24 days out of 30).  The patient is aware that  PAP usage ( time) will be added up--  together over a 24-hour period.  This simply means that the patient does not have to use the machine for 4 hours in a row and he does not have to be asleep for the minutes to count towards the required 4 hours.      It is recommended to the patient that he begin Pap therapy by first just wearing the mask and headgear loosely applied to the face for 20 minutes a day for the first 2 days.  Then  the patient may begin using the mask and headgear with the machine to get a feel for using the mask with a good seal This can be accomplished by using the entire PAP set up with the machine on for 20 to 30 minutes while the patient is awake.  Try this for 2 days.  These usage exercises will  allow the face and brain to get accustomed to mask, headgear and air pressures.      Also  the patient is instructed to keep the machine located slightly ( 6-12 ins)  below the level of the mattress.  This allows for proper humidification of the air before it reaches nasal passages and prevents inhaling water droplets.      Cleaning the mask, headgear, tubing, water reservoir to be done using hot water and a gentle detergent once a week.    The mask can be removed from the headgear and rinsed under hot water daily.  There is no special machine or device that is needed to keep the machine or supplies clean.      The risks of untreated sleep apnea were discussed with the patient at length. Patients with SAMARA are at increased risk of cardiovascular disease including coronary artery disease, systemic arterial hypertension, pulmonary arterial  hypertension, cardiac arrhythmias, and stroke. SAMARA patients have an increased risk of motor vehicle accidents, type 2 diabetes, chronic kidney disease, and non-alcoholic liver disease. The patient was advised to avoid driving a motor vehicle when drowsy.  Have advised the patient to follow up with the appropriate healthcare practitioners for all other medical problems and issues.    RETURN TO CLINIC: Return in about 8 weeks (around 5/12/2023) for Compliance check.    My total time spent caring for the patient on the day of the encounter was 40 minutes. This includes time spent on a thorough chart review including other physician notes, all sleep studies, as well as critical labs and pulmonary and cardiac studies.  Additionally, it includes a thorough discussion of good sleep hygiene and stimulus control, as well as  the need for consistency in terms of sleep preparation and practice.    Please note that this dictation was created using voice recognition software.  I have made every reasonable attempt to correct obvious errors, I expect that there are errors of grammar and possibly content that I did not discover before finalizing this note.

## 2023-03-20 LAB
CYTOLOGY REG CYTOL: NORMAL
HPV HR 12 DNA CVX QL NAA+PROBE: NEGATIVE
HPV16 DNA SPEC QL NAA+PROBE: NEGATIVE
HPV18 DNA SPEC QL NAA+PROBE: NEGATIVE
SPECIMEN SOURCE: NORMAL

## 2023-03-30 ENCOUNTER — HOSPITAL ENCOUNTER (OUTPATIENT)
Dept: LAB | Facility: MEDICAL CENTER | Age: 63
End: 2023-03-30
Attending: INTERNAL MEDICINE
Payer: COMMERCIAL

## 2023-03-30 DIAGNOSIS — Z00.00 PREVENTATIVE HEALTH CARE: ICD-10-CM

## 2023-03-30 LAB
25(OH)D3 SERPL-MCNC: 42 NG/ML (ref 30–100)
ALBUMIN SERPL BCP-MCNC: 4.3 G/DL (ref 3.2–4.9)
ALBUMIN/GLOB SERPL: 1.5 G/DL
ALP SERPL-CCNC: 68 U/L (ref 30–99)
ALT SERPL-CCNC: 31 U/L (ref 2–50)
ANION GAP SERPL CALC-SCNC: 12 MMOL/L (ref 7–16)
APPEARANCE UR: ABNORMAL
AST SERPL-CCNC: 27 U/L (ref 12–45)
BACTERIA #/AREA URNS HPF: ABNORMAL /HPF
BASOPHILS # BLD AUTO: 0.5 % (ref 0–1.8)
BASOPHILS # BLD: 0.04 K/UL (ref 0–0.12)
BILIRUB SERPL-MCNC: 0.3 MG/DL (ref 0.1–1.5)
BILIRUB UR QL STRIP.AUTO: NEGATIVE
BUN SERPL-MCNC: 18 MG/DL (ref 8–22)
CALCIUM ALBUM COR SERPL-MCNC: 9.6 MG/DL (ref 8.5–10.5)
CALCIUM SERPL-MCNC: 9.8 MG/DL (ref 8.4–10.2)
CHLORIDE SERPL-SCNC: 99 MMOL/L (ref 96–112)
CHOLEST SERPL-MCNC: 181 MG/DL (ref 100–199)
CO2 SERPL-SCNC: 26 MMOL/L (ref 20–33)
COLOR UR: YELLOW
CREAT SERPL-MCNC: 0.94 MG/DL (ref 0.5–1.4)
EOSINOPHIL # BLD AUTO: 0.18 K/UL (ref 0–0.51)
EOSINOPHIL NFR BLD: 2.5 % (ref 0–6.9)
EPI CELLS #/AREA URNS HPF: ABNORMAL /HPF
ERYTHROCYTE [DISTWIDTH] IN BLOOD BY AUTOMATED COUNT: 41.9 FL (ref 35.9–50)
EST. AVERAGE GLUCOSE BLD GHB EST-MCNC: 111 MG/DL
FASTING STATUS PATIENT QL REPORTED: NORMAL
GFR SERPLBLD CREATININE-BSD FMLA CKD-EPI: 68 ML/MIN/1.73 M 2
GLOBULIN SER CALC-MCNC: 2.8 G/DL (ref 1.9–3.5)
GLUCOSE SERPL-MCNC: 99 MG/DL (ref 65–99)
GLUCOSE UR STRIP.AUTO-MCNC: NEGATIVE MG/DL
HBA1C MFR BLD: 5.5 % (ref 4–5.6)
HCT VFR BLD AUTO: 48.1 % (ref 37–47)
HDLC SERPL-MCNC: 47 MG/DL
HGB BLD-MCNC: 16.1 G/DL (ref 12–16)
IMM GRANULOCYTES # BLD AUTO: 0.01 K/UL (ref 0–0.11)
IMM GRANULOCYTES NFR BLD AUTO: 0.1 % (ref 0–0.9)
KETONES UR STRIP.AUTO-MCNC: NEGATIVE MG/DL
LDLC SERPL CALC-MCNC: 85 MG/DL
LEUKOCYTE ESTERASE UR QL STRIP.AUTO: NEGATIVE
LYMPHOCYTES # BLD AUTO: 3.03 K/UL (ref 1–4.8)
LYMPHOCYTES NFR BLD: 41.6 % (ref 22–41)
MCH RBC QN AUTO: 30.7 PG (ref 27–33)
MCHC RBC AUTO-ENTMCNC: 33.5 G/DL (ref 33.6–35)
MCV RBC AUTO: 91.8 FL (ref 81.4–97.8)
MICRO URNS: ABNORMAL
MONOCYTES # BLD AUTO: 0.71 K/UL (ref 0–0.85)
MONOCYTES NFR BLD AUTO: 9.8 % (ref 0–13.4)
MUCOUS THREADS #/AREA URNS HPF: ABNORMAL /HPF
NEUTROPHILS # BLD AUTO: 3.31 K/UL (ref 2–7.15)
NEUTROPHILS NFR BLD: 45.5 % (ref 44–72)
NITRITE UR QL STRIP.AUTO: POSITIVE
NRBC # BLD AUTO: 0 K/UL
NRBC BLD-RTO: 0 /100 WBC
PH UR STRIP.AUTO: 5.5 [PH] (ref 5–8)
PLATELET # BLD AUTO: 237 K/UL (ref 164–446)
PMV BLD AUTO: 10.8 FL (ref 9–12.9)
POTASSIUM SERPL-SCNC: 4 MMOL/L (ref 3.6–5.5)
PROT SERPL-MCNC: 7.1 G/DL (ref 6–8.2)
PROT UR QL STRIP: NEGATIVE MG/DL
RBC # BLD AUTO: 5.24 M/UL (ref 4.2–5.4)
RBC # URNS HPF: ABNORMAL /HPF
RBC UR QL AUTO: ABNORMAL
SODIUM SERPL-SCNC: 137 MMOL/L (ref 135–145)
SP GR UR STRIP.AUTO: 1.01
TRIGL SERPL-MCNC: 245 MG/DL (ref 0–149)
TSH SERPL DL<=0.005 MIU/L-ACNC: 1.93 UIU/ML (ref 0.38–5.33)
WBC # BLD AUTO: 7.3 K/UL (ref 4.8–10.8)
WBC #/AREA URNS HPF: ABNORMAL /HPF

## 2023-03-30 PROCEDURE — 81001 URINALYSIS AUTO W/SCOPE: CPT

## 2023-03-30 PROCEDURE — 84443 ASSAY THYROID STIM HORMONE: CPT

## 2023-03-30 PROCEDURE — 82306 VITAMIN D 25 HYDROXY: CPT

## 2023-03-30 PROCEDURE — 80053 COMPREHEN METABOLIC PANEL: CPT

## 2023-03-30 PROCEDURE — 83036 HEMOGLOBIN GLYCOSYLATED A1C: CPT

## 2023-03-30 PROCEDURE — 85025 COMPLETE CBC W/AUTO DIFF WBC: CPT

## 2023-03-30 PROCEDURE — 36415 COLL VENOUS BLD VENIPUNCTURE: CPT

## 2023-03-30 PROCEDURE — 80061 LIPID PANEL: CPT

## 2023-03-31 ENCOUNTER — TELEPHONE (OUTPATIENT)
Dept: MEDICAL GROUP | Facility: MEDICAL CENTER | Age: 63
End: 2023-03-31
Payer: COMMERCIAL

## 2023-03-31 NOTE — TELEPHONE ENCOUNTER
Notified with results, triglycerides slightly elevated, no medications are necessary, 10-year cardiac risk 4% no need for statin therapy, continue to work on a good nutrition and exercise program.  Microscopic hematuria, urine culture pending.  Await result.

## 2023-03-31 NOTE — TELEPHONE ENCOUNTER
Spoke arthur pt, CoverMyMeds unable to verify her insurance coverage. Called [facility] and they were unable to approve PAR request. Transferred to pharmacy services who instructed me to install their portal and proceed that way. Pt will have to call her ins.

## 2023-04-17 DIAGNOSIS — N32.81 OVERACTIVE BLADDER: ICD-10-CM

## 2023-04-17 RX ORDER — PIMECROLIMUS 10 MG/G
CREAM TOPICAL
COMMUNITY
Start: 2023-04-02

## 2023-04-17 RX ORDER — TRIAMCINOLONE ACETONIDE 1 MG/G
CREAM TOPICAL
COMMUNITY
Start: 2023-04-02

## 2023-04-26 ENCOUNTER — APPOINTMENT (RX ONLY)
Dept: URBAN - METROPOLITAN AREA CLINIC 35 | Facility: CLINIC | Age: 63
Setting detail: DERMATOLOGY
End: 2023-04-26

## 2023-04-26 DIAGNOSIS — Z71.89 OTHER SPECIFIED COUNSELING: ICD-10-CM

## 2023-04-26 DIAGNOSIS — L81.4 OTHER MELANIN HYPERPIGMENTATION: ICD-10-CM

## 2023-04-26 DIAGNOSIS — L82.1 OTHER SEBORRHEIC KERATOSIS: ICD-10-CM

## 2023-04-26 DIAGNOSIS — D22 MELANOCYTIC NEVI: ICD-10-CM

## 2023-04-26 DIAGNOSIS — L71.0 PERIORAL DERMATITIS: ICD-10-CM

## 2023-04-26 DIAGNOSIS — L21.8 OTHER SEBORRHEIC DERMATITIS: ICD-10-CM

## 2023-04-26 DIAGNOSIS — L40.0 PSORIASIS VULGARIS: ICD-10-CM | Status: STABLE

## 2023-04-26 DIAGNOSIS — L72.0 EPIDERMAL CYST: ICD-10-CM

## 2023-04-26 DIAGNOSIS — L20.89 OTHER ATOPIC DERMATITIS: ICD-10-CM | Status: IMPROVED

## 2023-04-26 PROBLEM — L30.9 DERMATITIS, UNSPECIFIED: Status: ACTIVE | Noted: 2023-04-26

## 2023-04-26 PROBLEM — D22.5 MELANOCYTIC NEVI OF TRUNK: Status: ACTIVE | Noted: 2023-04-26

## 2023-04-26 PROCEDURE — ? ADDITIONAL NOTES

## 2023-04-26 PROCEDURE — 99213 OFFICE O/P EST LOW 20 MIN: CPT

## 2023-04-26 PROCEDURE — ? COUNSELING

## 2023-04-26 ASSESSMENT — LOCATION DETAILED DESCRIPTION DERM
LOCATION DETAILED: RIGHT SUPERIOR UPPER BACK
LOCATION DETAILED: RIGHT BUTTOCK
LOCATION DETAILED: RIGHT INFERIOR MEDIAL MALAR CHEEK
LOCATION DETAILED: RIGHT POSTERIOR SHOULDER
LOCATION DETAILED: LEFT MEDIAL UPPER BACK
LOCATION DETAILED: RIGHT ANTERIOR PROXIMAL THIGH
LOCATION DETAILED: RIGHT SUPERIOR MEDIAL MALAR CHEEK
LOCATION DETAILED: RIGHT LOWER CUTANEOUS LIP
LOCATION DETAILED: MID-OCCIPITAL SCALP
LOCATION DETAILED: LEFT INFERIOR MEDIAL MALAR CHEEK

## 2023-04-26 ASSESSMENT — SEVERITY ASSESSMENT 2020: SEVERITY 2020: CLEAR

## 2023-04-26 ASSESSMENT — LOCATION SIMPLE DESCRIPTION DERM
LOCATION SIMPLE: RIGHT CHEEK
LOCATION SIMPLE: LEFT CHEEK
LOCATION SIMPLE: RIGHT UPPER BACK
LOCATION SIMPLE: RIGHT SHOULDER
LOCATION SIMPLE: POSTERIOR SCALP
LOCATION SIMPLE: RIGHT LIP
LOCATION SIMPLE: RIGHT THIGH
LOCATION SIMPLE: RIGHT BUTTOCK
LOCATION SIMPLE: LEFT UPPER BACK

## 2023-04-26 ASSESSMENT — LOCATION ZONE DERM
LOCATION ZONE: ARM
LOCATION ZONE: LEG
LOCATION ZONE: LIP
LOCATION ZONE: FACE
LOCATION ZONE: SCALP
LOCATION ZONE: TRUNK

## 2023-04-26 NOTE — HPI: FULL BODY SKIN EXAMINATION
How Severe Are Your Spot(S)?: mild
What Type Of Note Output Would You Prefer (Optional)?: Standard Output
What Is The Reason For Today's Visit?: Full Body Skin Examination
What Is The Reason For Today's Visit? (Being Monitored For X): concerning skin lesions on a periodic basis
Additional History: Patient complains of rash around mouth.

## 2023-05-08 ENCOUNTER — TELEPHONE (OUTPATIENT)
Dept: MEDICAL GROUP | Facility: MEDICAL CENTER | Age: 63
End: 2023-05-08

## 2023-05-08 DIAGNOSIS — R30.0 DYSURIA: ICD-10-CM

## 2023-05-16 ENCOUNTER — APPOINTMENT (OUTPATIENT)
Dept: SLEEP MEDICINE | Facility: MEDICAL CENTER | Age: 63
End: 2023-05-16
Attending: NURSE PRACTITIONER
Payer: COMMERCIAL

## 2023-06-24 ENCOUNTER — TELEPHONE (OUTPATIENT)
Dept: MEDICAL GROUP | Facility: MEDICAL CENTER | Age: 63
End: 2023-06-24
Payer: COMMERCIAL

## 2023-06-24 DIAGNOSIS — N30.01 ACUTE CYSTITIS WITH HEMATURIA: ICD-10-CM

## 2023-06-24 PROBLEM — Z01.419 ENCOUNTER FOR ANNUAL ROUTINE GYNECOLOGICAL EXAMINATION: Status: RESOLVED | Noted: 2023-03-16 | Resolved: 2023-06-24

## 2023-06-24 LAB
25(OH)D3+25(OH)D2 SERPL-MCNC: 48.4 NG/ML (ref 30–100)
ALBUMIN SERPL-MCNC: 4.4 G/DL (ref 3.8–4.8)
ALBUMIN/GLOB SERPL: 1.8 {RATIO} (ref 1.2–2.2)
ALP SERPL-CCNC: 64 IU/L (ref 44–121)
ALT SERPL-CCNC: 41 IU/L (ref 0–32)
AST SERPL-CCNC: 31 IU/L (ref 0–40)
BACTERIA UR CULT: ABNORMAL
BACTERIA UR CULT: ABNORMAL
BASOPHILS # BLD AUTO: 0.1 X10E3/UL (ref 0–0.2)
BASOPHILS NFR BLD AUTO: 1 %
BILIRUB SERPL-MCNC: 0.3 MG/DL (ref 0–1.2)
BUN SERPL-MCNC: 19 MG/DL (ref 8–27)
BUN/CREAT SERPL: 23 (ref 12–28)
CALCIUM SERPL-MCNC: 9.6 MG/DL (ref 8.7–10.3)
CHLORIDE SERPL-SCNC: 99 MMOL/L (ref 96–106)
CHOLEST SERPL-MCNC: 159 MG/DL (ref 100–199)
CO2 SERPL-SCNC: 25 MMOL/L (ref 20–29)
CREAT SERPL-MCNC: 0.81 MG/DL (ref 0.57–1)
EGFRCR SERPLBLD CKD-EPI 2021: 82 ML/MIN/1.73
EOSINOPHIL # BLD AUTO: 0.1 X10E3/UL (ref 0–0.4)
EOSINOPHIL NFR BLD AUTO: 2 %
ERYTHROCYTE [DISTWIDTH] IN BLOOD BY AUTOMATED COUNT: 13.3 % (ref 11.7–15.4)
GLOBULIN SER CALC-MCNC: 2.5 G/DL (ref 1.5–4.5)
GLUCOSE SERPL-MCNC: 99 MG/DL (ref 70–99)
HBA1C MFR BLD: 5.7 % (ref 4.8–5.6)
HCT VFR BLD AUTO: 44.6 % (ref 34–46.6)
HDLC SERPL-MCNC: 49 MG/DL
HGB BLD-MCNC: 14.8 G/DL (ref 11.1–15.9)
IMM GRANULOCYTES # BLD AUTO: 0 X10E3/UL (ref 0–0.1)
IMM GRANULOCYTES NFR BLD AUTO: 0 %
IMMATURE CELLS  115398: NORMAL
LABORATORY COMMENT REPORT: ABNORMAL
LDLC SERPL CALC-MCNC: 77 MG/DL (ref 0–99)
LYMPHOCYTES # BLD AUTO: 2.4 X10E3/UL (ref 0.7–3.1)
LYMPHOCYTES NFR BLD AUTO: 39 %
MCH RBC QN AUTO: 30.8 PG (ref 26.6–33)
MCHC RBC AUTO-ENTMCNC: 33.2 G/DL (ref 31.5–35.7)
MCV RBC AUTO: 93 FL (ref 79–97)
MONOCYTES # BLD AUTO: 0.7 X10E3/UL (ref 0.1–0.9)
MONOCYTES NFR BLD AUTO: 12 %
MORPHOLOGY BLD-IMP: NORMAL
NEUTROPHILS # BLD AUTO: 2.9 X10E3/UL (ref 1.4–7)
NEUTROPHILS NFR BLD AUTO: 46 %
NRBC BLD AUTO-RTO: NORMAL %
OTHER ANTIBIOTIC SUSC ISLT: ABNORMAL
PLATELET # BLD AUTO: 214 X10E3/UL (ref 150–450)
POTASSIUM SERPL-SCNC: 4.6 MMOL/L (ref 3.5–5.2)
PROT SERPL-MCNC: 6.9 G/DL (ref 6–8.5)
RBC # BLD AUTO: 4.8 X10E6/UL (ref 3.77–5.28)
SODIUM SERPL-SCNC: 136 MMOL/L (ref 134–144)
TRIGL SERPL-MCNC: 200 MG/DL (ref 0–149)
TSH SERPL DL<=0.005 MIU/L-ACNC: 1.9 UIU/ML (ref 0.45–4.5)
VLDLC SERPL CALC-MCNC: 33 MG/DL (ref 5–40)
WBC # BLD AUTO: 6.1 X10E3/UL (ref 3.4–10.8)

## 2023-06-24 RX ORDER — NITROFURANTOIN 25; 75 MG/1; MG/1
100 CAPSULE ORAL EVERY 12 HOURS
Qty: 10 CAPSULE | Refills: 0 | Status: SHIPPED | OUTPATIENT
Start: 2023-06-24 | End: 2023-06-29

## 2023-06-24 NOTE — TELEPHONE ENCOUNTER
Notified with results, borderline elevation A1c, normal fasting blood sugar, she will continue her good efforts with limiting carbohydrate portion serving sizes and processed foods.  Cholesterol has improved, UTI E. coli which she has had previously, last treatment with Macrobid a year ago, no side effects with Macrobid at that time, will use Macrobid again 1 pill twice a day x5 days, antibiotic can cause loose stools, or rash.  Prescription Macrobid sent to [facility].

## 2023-06-26 ENCOUNTER — APPOINTMENT (RX ONLY)
Dept: URBAN - METROPOLITAN AREA CLINIC 35 | Facility: CLINIC | Age: 63
Setting detail: DERMATOLOGY
End: 2023-06-26

## 2023-06-26 DIAGNOSIS — L71.0 PERIORAL DERMATITIS: ICD-10-CM

## 2023-06-26 DIAGNOSIS — L40.0 PSORIASIS VULGARIS: ICD-10-CM

## 2023-06-26 PROCEDURE — 99213 OFFICE O/P EST LOW 20 MIN: CPT

## 2023-06-26 PROCEDURE — ? TREATMENT REGIMEN

## 2023-06-26 PROCEDURE — ? PRESCRIPTION

## 2023-06-26 PROCEDURE — ? COUNSELING

## 2023-06-26 RX ORDER — MINOCYCLINE HYDROCHLORIDE 100 MG/1
1 TABLET ORAL QD
Qty: 30 | Refills: 1 | Status: ERX | COMMUNITY
Start: 2023-06-26

## 2023-06-26 RX ADMIN — MINOCYCLINE HYDROCHLORIDE 1: 100 TABLET ORAL at 00:00

## 2023-06-26 ASSESSMENT — LOCATION SIMPLE DESCRIPTION DERM
LOCATION SIMPLE: RIGHT CHEEK
LOCATION SIMPLE: POSTERIOR SCALP

## 2023-06-26 ASSESSMENT — LOCATION ZONE DERM
LOCATION ZONE: FACE
LOCATION ZONE: SCALP

## 2023-06-26 ASSESSMENT — BSA PSORIASIS: % BODY COVERED IN PSORIASIS: 2

## 2023-06-26 ASSESSMENT — ITCH NUMERIC RATING SCALE: HOW SEVERE IS YOUR ITCHING?: 3

## 2023-06-26 ASSESSMENT — LOCATION DETAILED DESCRIPTION DERM
LOCATION DETAILED: RIGHT CENTRAL BUCCAL CHEEK
LOCATION DETAILED: MID-OCCIPITAL SCALP

## 2023-06-26 NOTE — PROCEDURE: TREATMENT REGIMEN
Detail Level: Zone
Initiate Treatment: Minocycline 100 mg qd
Continue Regimen: Elidel bid
Action 2: Continue
Continue Regimen: Clobetasol solution and Calcipotriene for up to 6 weeks alternating with 2 weeks of Calcipotriene solution bid

## 2023-06-26 NOTE — HPI: RASH (PERIORAL DERMATITIS)
Is This A New Presentation, Or A Follow-Up?: Follow Up Perioral Dermatitis
Additional History: Per patient, she is not improved.

## 2023-07-12 ENCOUNTER — OFFICE VISIT (OUTPATIENT)
Dept: SLEEP MEDICINE | Facility: MEDICAL CENTER | Age: 63
End: 2023-07-12
Attending: PREVENTIVE MEDICINE
Payer: COMMERCIAL

## 2023-07-12 VITALS
HEIGHT: 64 IN | WEIGHT: 197 LBS | HEART RATE: 78 BPM | RESPIRATION RATE: 16 BRPM | DIASTOLIC BLOOD PRESSURE: 70 MMHG | OXYGEN SATURATION: 93 % | SYSTOLIC BLOOD PRESSURE: 120 MMHG | BODY MASS INDEX: 33.63 KG/M2

## 2023-07-12 DIAGNOSIS — G47.33 OSA ON CPAP: ICD-10-CM

## 2023-07-12 PROCEDURE — 3074F SYST BP LT 130 MM HG: CPT | Performed by: PREVENTIVE MEDICINE

## 2023-07-12 PROCEDURE — 3078F DIAST BP <80 MM HG: CPT | Performed by: PREVENTIVE MEDICINE

## 2023-07-12 PROCEDURE — 99212 OFFICE O/P EST SF 10 MIN: CPT | Performed by: PREVENTIVE MEDICINE

## 2023-07-12 PROCEDURE — 99214 OFFICE O/P EST MOD 30 MIN: CPT | Performed by: PREVENTIVE MEDICINE

## 2023-07-12 RX ORDER — MINOCYCLINE HYDROCHLORIDE 100 MG/1
100 TABLET ORAL DAILY
COMMUNITY
Start: 2023-06-26 | End: 2023-11-21

## 2023-07-12 ASSESSMENT — FIBROSIS 4 INDEX: FIB4 SCORE: 1.4

## 2023-07-12 NOTE — PROGRESS NOTES
CHIEF COMPLAINT: Compliance check/Annual Visit/First Compliance  LAST SEEN: Dr. Torres on 3/17/2030  HISTORY OF PRESENT ILLNESS:  Ramona Wilson is a 62 y.o.female   who is here today to follow-up  for SAMARA.  She has met all insurance requirements for compliance.     COMPLIANCE DATA greater than 4 hours: 100%  Machine type: AirSense 11 AutoSet  Date range: 6/12 through 7/11/2023  AHI: 5.2 (4.3 events per hour are central apneas)  TIME USED: 8 hours and 51 minutes  PRESSURE SETTINGS: 12 cm of water through 18 cm of water  LEAK: Excessive at 44.6 L/min  DME  [facility]    This patient is using PAP therapy consistently and is benefiting from it .        Significant comorbidities and modifying factors: see below    PAST MEDICAL HISTORY:  Past Medical History:   Diagnosis Date    Anxiety 4/21/2009    Blood in urine     Chickenpox     Chronic low back pain 4/21/2009    Constipation     Depression     Diarrhea     Dyslipidemia 4/21/2009    Eczema 4/21/2009    Hypovitaminosis D 4/23/2009    Obesity     RLS (restless legs syndrome)     Sleep apnea     Surveillance for birth control, oral contraceptives 4/21/2009    Wears glasses       PROBLEM LIST:  Patient Active Problem List    Diagnosis Date Noted    Overactive bladder 04/17/2023    Stress incontinence 03/16/2023    History of COVID-19 03/02/2023    Knee arthritis 12/11/2022    History of shoulder pain 08/09/2021    History of c.difficile colitis 03/30/2020    History of UTI 12/26/2019    Impaired fasting glucose 02/15/2017    Obesity 09/30/2015    Abnormal liver function 09/17/2015    History of elbow fracture, left 03/30/2015    Foot arthritis 10/08/2013    S/P cholecystectomy 12/07/2010    Sleep apnea 10/15/2010    History of low back pain 04/21/2009    Dyslipidemia 04/21/2009    Depression 04/21/2009    Preventative health care 04/21/2009     PAST SOCIAL HISTORY:  Past Surgical History:   Procedure Laterality Date    BREAST BIOPSY Right 08/12/2021    benign    [place]  BREAST REDUCTION  2011    MAMMOPLASTY REDUCTION  2010    Performed by LUANNE LARRY at SURGERY [facility] ORS    PRIMARY C SECTION  1994        MOSHE BY LAPAROSCOPY       PAST FAMILY HISTORY:  Family History   Problem Relation Age of Onset    Cancer Mother         lung cancer,osteoporosis    Hypertension Brother     Heart Disease Brother     Alcohol abuse Brother     Diabetes Father     Lung Disease Father     Sleep Apnea Father     Cancer Maternal Aunt     Cancer Maternal Grandmother     Alcohol/Drug Maternal Grandfather     Stroke Paternal Grandfather     Cancer Maternal Aunt      SOCIAL HISTORY:  Social History     Socioeconomic History    Marital status:      Spouse name: Not on file    Number of children: Not on file    Years of education: Not on file    Highest education level: 12th grade   Occupational History    Not on file   Tobacco Use    Smoking status: Never    Smokeless tobacco: Never   Vaping Use    Vaping Use: Never used   Substance and Sexual Activity    Alcohol use: No     Alcohol/week: 0.0 oz    Drug use: No    Sexual activity: Yes     Partners: Male   Other Topics Concern    Not on file   Social History Narrative    Not on file     Social Determinants of Health     Financial Resource Strain: Not on file   Food Insecurity: No Food Insecurity (2023)    Hunger Vital Sign     Worried About Running Out of Food in the Last Year: Never true     Ran Out of Food in the Last Year: Never true   Transportation Needs: No Transportation Needs (2023)    PRAPARE - Transportation     Lack of Transportation (Medical): No     Lack of Transportation (Non-Medical): No   Physical Activity: Sufficiently Active (2023)    Exercise Vital Sign     Days of Exercise per Week: 5 days     Minutes of Exercise per Session: 70 min   Stress: Stress Concern Present (2023)    [place] [employer] [employer] - Occupational Stress Questionnaire     Feeling of Stress : Very  much   Social Connections: Unknown (2/27/2023)    Social Connection and Isolation Panel [NHANES]     Frequency of Communication with Friends and Family: Once a week     Frequency of Social Gatherings with Friends and Family: Patient refused     Attends Taoism Services: Never     Active Member of Clubs or Organizations: No     Attends Club or Organization Meetings: Never     Marital Status:    Intimate Partner Violence: Not on file   Housing Stability: Low Risk  (2/27/2023)    Housing Stability Vital Sign     Unable to Pay for Housing in the Last Year: No     Number of Places Lived in the Last Year: 1     Unstable Housing in the Last Year: No     ALLERGIES: Amoxicillin and Other drug  MEDICATIONS:  Current Outpatient Medications   Medication Sig Dispense Refill    minocycline (DYNACIN) 100 MG tablet Take 100 mg by mouth every day.      pimecrolimus (ELIDEL) 1 % cream APPLY ON FACE TWICE DAILY      triamcinolone acetonide (KENALOG) 0.1 % Cream PLEASE SEE ATTACHED FOR DETAILED DIRECTIONS      HYDROcodone-acetaminophen (NORCO) 7.5-325 MG tab       buPROPion (WELLBUTRIN XL) 300 MG XL tablet Take 300 mg by mouth every morning.      meloxicam (MOBIC) 7.5 MG Tab Take 1 Tablet by mouth every day. 30 Tablet 1    FLUoxetine (PROZAC) 20 MG Cap Take 1 Capsule by mouth every day. 30 Capsule 5    hydrOXYzine HCl (ATARAX) 10 MG Tab       clobetasol (TEMOVATE) 0.05 % external solution APPLY 1 APPLICATION TO AFFECTED AREA(S) 2 TIMES A DAY. 50 mL 5    Omega-3 Fatty Acids (FISH OIL) 1000 MG Cap capsule Take 1,000 mg by mouth 3 times a day, with meals.      Cholecalciferol (VITAMIN D) 1000 UNIT CAPS Take  by mouth every day.      tolterodine ER (DETROL-LA) 2 MG CAPSULE SR 24 HR TAKE 1 CAPSULE BY MOUTH EVERY DAY (Patient not taking: Reported on 7/12/2023) 30 Capsule 3    Semaglutide (WEGOVY) 0.25 MG/0.5ML Solution Auto-injector Pen-injector Inject 0.5 mL under the skin every 7 days. (Patient not taking: Reported on 7/12/2023) 4  "Each 1     No current facility-administered medications for this visit.    \"CURRENT RX\"    REVIEW OF SYSTEMS:  SEE HPI      PHYSICAL EXAM/VITALS:  /70 (BP Location: Left arm, Patient Position: Sitting, BP Cuff Size: Large adult)   Pulse 78   Resp 16   Ht 1.626 m (5' 4\")   Wt 89.4 kg (197 lb)   LMP 10/10/2010   SpO2 93%   BMI 33.81 kg/m²   Appearance: Well-nourished, well-developed,  looks stated age, no acute distress  Eyes:   EOMI  ENMT:  WNL  Neck: Supple, trachea midline  Respiratory effort:  No intercostal retractions or use of accessory muscles  Musculoskeletal:  Grossly normal; gait and station normal  Neurologic:  oriented to person, time, place, and purpose; judgement intact  Psychiatric:  No depression, anxiety, agitation    MEDICAL DECISION MAKING:  The medical record was reviewed as it pertains to this referral. This includes records from primary care,consultants notes, referral request, hospital records, labs and imaging. Any available diagnostic and titration nocturnal polysomnograms, home sleep apnea tests, continuous nocturnal oximetry results, multiple sleep latency tests, and recent compliance reports were reviewed with the patient.    ASSESSMENT/PLAN:  Ramona Wilson is a 62 y.o.female who is doing well on PAP therapy.  She has met all insurance requirements for compliance.  Minimum pressure was changed today to address the central apneas.  Patient was recommended to use mouth tape see below patient will undergo a mask fit using an N30 and an Deejay mask -nasal.        DIAGNOSES :    1. SAMARA on CPAP  - DME Mask Fitting; Future  - DME Mask and Supplies  - DME Other    Other orders  - minocycline (DYNACIN) 100 MG tablet; Take 100 mg by mouth every day.        This patient was advised to use Hypafix dermatological tape to keep her mouth closed in sleep. SHE was instructed how to prepare the skin and placed the pieces of tape in a way that we will keep her mouth closed but allow her to " drink through a straw during the night.  The patient was given a sample of the tape and was Informed where  it can be purchased.     The risks of untreated sleep apnea were discussed with the patient at length. Patients with SAMARA are at increased risk of cardiovascular disease including coronary artery disease, systemic arterial hypertension, pulmonary arterial hypertension, cardiac arrhythmias, and stroke. SAMARA patients have an increased risk of motor vehicle accidents, type 2 diabetes, chronic kidney disease, and non-alcoholic liver disease. The patient was advised to avoid driving a motor vehicle when drowsy.  Have advised the patient to follow up with the appropriate healthcare practitioners for all other medical problems and issues.    RETURN TO CLINIC: Return in about 1 year (around 7/12/2024) for Annual visit.    My total time spent caring for the patient on the day of the encounter was 40 minutes. This includes time spent on a thorough chart review including other physician notes, all sleep studies, as well as critical labs and pulmonary and cardiac studies.  Additionally, it includes a thorough discussion of good sleep hygiene and stimulus control, as well as  the need for consistency in terms of sleep preparation and practice.    Please note that this dictation was created using voice recognition software.  I have made every reasonable attempt to correct obvious errors, I expect that there are errors of grammar and possibly content that I did not discover before finalizing this note.

## 2023-07-13 ENCOUNTER — APPOINTMENT (OUTPATIENT)
Dept: MEDICAL GROUP | Facility: MEDICAL CENTER | Age: 63
End: 2023-07-13
Payer: COMMERCIAL

## 2023-08-21 ENCOUNTER — APPOINTMENT (RX ONLY)
Dept: URBAN - METROPOLITAN AREA CLINIC 35 | Facility: CLINIC | Age: 63
Setting detail: DERMATOLOGY
End: 2023-08-21

## 2023-08-21 DIAGNOSIS — L71.0 PERIORAL DERMATITIS: ICD-10-CM | Status: IMPROVED

## 2023-08-21 DIAGNOSIS — L40.0 PSORIASIS VULGARIS: ICD-10-CM | Status: WELL CONTROLLED

## 2023-08-21 PROCEDURE — ? TREATMENT REGIMEN

## 2023-08-21 PROCEDURE — ? COUNSELING

## 2023-08-21 PROCEDURE — 99213 OFFICE O/P EST LOW 20 MIN: CPT

## 2023-08-21 ASSESSMENT — PGA PSORIASIS: PGA PSORIASIS 2020: CLEAR

## 2023-08-21 ASSESSMENT — LOCATION ZONE DERM
LOCATION ZONE: SCALP
LOCATION ZONE: FACE

## 2023-08-21 ASSESSMENT — LOCATION DETAILED DESCRIPTION DERM
LOCATION DETAILED: MID-OCCIPITAL SCALP
LOCATION DETAILED: RIGHT CENTRAL BUCCAL CHEEK

## 2023-08-21 ASSESSMENT — BSA PSORIASIS: % BODY COVERED IN PSORIASIS: 0

## 2023-08-21 ASSESSMENT — LOCATION SIMPLE DESCRIPTION DERM
LOCATION SIMPLE: RIGHT CHEEK
LOCATION SIMPLE: POSTERIOR SCALP

## 2023-08-21 ASSESSMENT — ITCH NUMERIC RATING SCALE: HOW SEVERE IS YOUR ITCHING?: 0

## 2023-08-21 NOTE — PROCEDURE: TREATMENT REGIMEN
Detail Level: Zone
Continue Regimen: Elidel bid
Action 2: Continue
Continue Regimen: Clobetasol solution and Calcipotriene for up to 6 weeks alternating with 2 weeks of Calcipotriene solution bid

## 2023-09-26 ENCOUNTER — TELEPHONE (OUTPATIENT)
Dept: MEDICAL GROUP | Facility: MEDICAL CENTER | Age: 63
End: 2023-09-26
Payer: COMMERCIAL

## 2023-09-26 DIAGNOSIS — R30.0 DYSURIA: ICD-10-CM

## 2023-09-26 NOTE — TELEPHONE ENCOUNTER
Ramona Zacariascolby  [phone] (home)     Pt called to advise that she is having some dysuria these past 2 days and would like you to order a UA. Please advise her when ordered.

## 2023-09-29 ENCOUNTER — HOSPITAL ENCOUNTER (OUTPATIENT)
Dept: LAB | Facility: MEDICAL CENTER | Age: 63
End: 2023-09-29
Attending: INTERNAL MEDICINE
Payer: COMMERCIAL

## 2023-09-29 ENCOUNTER — TELEPHONE (OUTPATIENT)
Dept: MEDICAL GROUP | Facility: MEDICAL CENTER | Age: 63
End: 2023-09-29
Payer: COMMERCIAL

## 2023-09-29 DIAGNOSIS — Z00.00 PREVENTATIVE HEALTH CARE: ICD-10-CM

## 2023-09-29 LAB
APPEARANCE UR: CLEAR
BACTERIA #/AREA URNS HPF: NEGATIVE /HPF
BILIRUB UR QL STRIP.AUTO: NEGATIVE
COLOR UR: YELLOW
EPI CELLS #/AREA URNS HPF: NEGATIVE /HPF
GLUCOSE UR STRIP.AUTO-MCNC: NEGATIVE MG/DL
HYALINE CASTS #/AREA URNS LPF: NORMAL /LPF
KETONES UR STRIP.AUTO-MCNC: NEGATIVE MG/DL
LEUKOCYTE ESTERASE UR QL STRIP.AUTO: NEGATIVE
MICRO URNS: ABNORMAL
NITRITE UR QL STRIP.AUTO: NEGATIVE
PH UR STRIP.AUTO: 5.5 [PH] (ref 5–8)
PROT UR QL STRIP: NEGATIVE MG/DL
RBC # URNS HPF: NORMAL /HPF
RBC UR QL AUTO: ABNORMAL
SP GR UR STRIP.AUTO: 1.02
UROBILINOGEN UR STRIP.AUTO-MCNC: 0.2 MG/DL
WBC #/AREA URNS HPF: NORMAL /HPF

## 2023-09-29 PROCEDURE — 81001 URINALYSIS AUTO W/SCOPE: CPT

## 2023-09-30 NOTE — TELEPHONE ENCOUNTER
Called the patient left a message, please thank her for dropping the urine specimen off at the lab, the urinalysis is negative, although the lab will keep the urine sample for 72 hours in case there is a bacteria that grows out of the urine.  The standard screening test is negative for infection.

## 2023-10-02 ENCOUNTER — TELEPHONE (OUTPATIENT)
Dept: MEDICAL GROUP | Facility: MEDICAL CENTER | Age: 63
End: 2023-10-02
Payer: COMMERCIAL

## 2023-10-02 NOTE — TELEPHONE ENCOUNTER
----- Message from Jordan Rand M.D. sent at 9/29/2023  6:45 PM PDT -----  Called the patient left a message, please thank her for dropping the urine specimen off at the lab, the urinalysis is negative, although the lab will keep the urine sample for 72 hours in case there is a bacteria that grows out of the urine.  The standard screening test is negative for infection.

## 2023-10-10 ENCOUNTER — APPOINTMENT (OUTPATIENT)
Dept: RADIOLOGY | Facility: MEDICAL CENTER | Age: 63
End: 2023-10-10
Attending: INTERNAL MEDICINE
Payer: COMMERCIAL

## 2023-10-10 DIAGNOSIS — Z12.31 VISIT FOR SCREENING MAMMOGRAM: ICD-10-CM

## 2023-10-10 PROCEDURE — 77063 BREAST TOMOSYNTHESIS BI: CPT

## 2023-11-15 ENCOUNTER — HOSPITAL ENCOUNTER (OUTPATIENT)
Dept: LAB | Facility: MEDICAL CENTER | Age: 63
End: 2023-11-15
Attending: INTERNAL MEDICINE
Payer: COMMERCIAL

## 2023-11-15 DIAGNOSIS — R30.0 DYSURIA: ICD-10-CM

## 2023-11-15 DIAGNOSIS — Z00.00 PREVENTATIVE HEALTH CARE: ICD-10-CM

## 2023-11-15 LAB
25(OH)D3 SERPL-MCNC: 41 NG/ML (ref 30–100)
ALBUMIN SERPL BCP-MCNC: 4.3 G/DL (ref 3.2–4.9)
ALBUMIN/GLOB SERPL: 1.6 G/DL
ALP SERPL-CCNC: 54 U/L (ref 30–99)
ALT SERPL-CCNC: 27 U/L (ref 2–50)
ANION GAP SERPL CALC-SCNC: 12 MMOL/L (ref 7–16)
AST SERPL-CCNC: 26 U/L (ref 12–45)
BASOPHILS # BLD AUTO: 0.8 % (ref 0–1.8)
BASOPHILS # BLD: 0.05 K/UL (ref 0–0.12)
BILIRUB SERPL-MCNC: 0.2 MG/DL (ref 0.1–1.5)
BUN SERPL-MCNC: 19 MG/DL (ref 8–22)
CALCIUM ALBUM COR SERPL-MCNC: 9.3 MG/DL (ref 8.5–10.5)
CALCIUM SERPL-MCNC: 9.5 MG/DL (ref 8.5–10.5)
CHLORIDE SERPL-SCNC: 105 MMOL/L (ref 96–112)
CHOLEST SERPL-MCNC: 140 MG/DL (ref 100–199)
CO2 SERPL-SCNC: 23 MMOL/L (ref 20–33)
CREAT SERPL-MCNC: 0.71 MG/DL (ref 0.5–1.4)
EOSINOPHIL # BLD AUTO: 0.13 K/UL (ref 0–0.51)
EOSINOPHIL NFR BLD: 2 % (ref 0–6.9)
ERYTHROCYTE [DISTWIDTH] IN BLOOD BY AUTOMATED COUNT: 44 FL (ref 35.9–50)
EST. AVERAGE GLUCOSE BLD GHB EST-MCNC: 114 MG/DL
FASTING STATUS PATIENT QL REPORTED: NORMAL
GFR SERPLBLD CREATININE-BSD FMLA CKD-EPI: 96 ML/MIN/1.73 M 2
GLOBULIN SER CALC-MCNC: 2.7 G/DL (ref 1.9–3.5)
GLUCOSE SERPL-MCNC: 86 MG/DL (ref 65–99)
HBA1C MFR BLD: 5.6 % (ref 4–5.6)
HCT VFR BLD AUTO: 45.9 % (ref 37–47)
HDLC SERPL-MCNC: 50 MG/DL
HGB BLD-MCNC: 14.6 G/DL (ref 12–16)
IMM GRANULOCYTES # BLD AUTO: 0.02 K/UL (ref 0–0.11)
IMM GRANULOCYTES NFR BLD AUTO: 0.3 % (ref 0–0.9)
LDLC SERPL CALC-MCNC: 64 MG/DL
LYMPHOCYTES # BLD AUTO: 2.5 K/UL (ref 1–4.8)
LYMPHOCYTES NFR BLD: 38.5 % (ref 22–41)
MCH RBC QN AUTO: 29.9 PG (ref 27–33)
MCHC RBC AUTO-ENTMCNC: 31.8 G/DL (ref 32.2–35.5)
MCV RBC AUTO: 93.9 FL (ref 81.4–97.8)
MONOCYTES # BLD AUTO: 0.53 K/UL (ref 0–0.85)
MONOCYTES NFR BLD AUTO: 8.2 % (ref 0–13.4)
NEUTROPHILS # BLD AUTO: 3.27 K/UL (ref 1.82–7.42)
NEUTROPHILS NFR BLD: 50.2 % (ref 44–72)
NRBC # BLD AUTO: 0 K/UL
NRBC BLD-RTO: 0 /100 WBC (ref 0–0.2)
PLATELET # BLD AUTO: 219 K/UL (ref 164–446)
PMV BLD AUTO: 11.6 FL (ref 9–12.9)
POTASSIUM SERPL-SCNC: 4.1 MMOL/L (ref 3.6–5.5)
PROT SERPL-MCNC: 7 G/DL (ref 6–8.2)
RBC # BLD AUTO: 4.89 M/UL (ref 4.2–5.4)
SODIUM SERPL-SCNC: 140 MMOL/L (ref 135–145)
TRIGL SERPL-MCNC: 131 MG/DL (ref 0–149)
TSH SERPL DL<=0.005 MIU/L-ACNC: 1.14 UIU/ML (ref 0.38–5.33)
WBC # BLD AUTO: 6.5 K/UL (ref 4.8–10.8)

## 2023-11-15 PROCEDURE — 82306 VITAMIN D 25 HYDROXY: CPT

## 2023-11-15 PROCEDURE — 84443 ASSAY THYROID STIM HORMONE: CPT

## 2023-11-15 PROCEDURE — 80061 LIPID PANEL: CPT

## 2023-11-15 PROCEDURE — 80053 COMPREHEN METABOLIC PANEL: CPT

## 2023-11-15 PROCEDURE — 36415 COLL VENOUS BLD VENIPUNCTURE: CPT

## 2023-11-15 PROCEDURE — 87086 URINE CULTURE/COLONY COUNT: CPT

## 2023-11-15 PROCEDURE — 83036 HEMOGLOBIN GLYCOSYLATED A1C: CPT

## 2023-11-15 PROCEDURE — 85025 COMPLETE CBC W/AUTO DIFF WBC: CPT

## 2023-11-17 LAB
BACTERIA UR CULT: NORMAL
SIGNIFICANT IND 70042: NORMAL
SITE SITE: NORMAL
SOURCE SOURCE: NORMAL

## 2023-11-21 ENCOUNTER — OFFICE VISIT (OUTPATIENT)
Dept: MEDICAL GROUP | Facility: MEDICAL CENTER | Age: 63
End: 2023-11-21
Payer: COMMERCIAL

## 2023-11-21 VITALS
RESPIRATION RATE: 16 BRPM | HEART RATE: 84 BPM | TEMPERATURE: 97.4 F | HEIGHT: 64 IN | BODY MASS INDEX: 31.58 KG/M2 | DIASTOLIC BLOOD PRESSURE: 74 MMHG | SYSTOLIC BLOOD PRESSURE: 116 MMHG | OXYGEN SATURATION: 97 % | WEIGHT: 185 LBS

## 2023-11-21 DIAGNOSIS — E66.9 CLASS 1 OBESITY WITHOUT SERIOUS COMORBIDITY WITH BODY MASS INDEX (BMI) OF 34.0 TO 34.9 IN ADULT, UNSPECIFIED OBESITY TYPE: ICD-10-CM

## 2023-11-21 DIAGNOSIS — Z23 NEEDS FLU SHOT: ICD-10-CM

## 2023-11-21 DIAGNOSIS — E78.5 DYSLIPIDEMIA: Chronic | ICD-10-CM

## 2023-11-21 DIAGNOSIS — M54.9 BACK PAIN, UNSPECIFIED BACK LOCATION, UNSPECIFIED BACK PAIN LATERALITY, UNSPECIFIED CHRONICITY: ICD-10-CM

## 2023-11-21 DIAGNOSIS — Z00.00 PREVENTATIVE HEALTH CARE: ICD-10-CM

## 2023-11-21 PROCEDURE — 3074F SYST BP LT 130 MM HG: CPT | Performed by: INTERNAL MEDICINE

## 2023-11-21 PROCEDURE — 99214 OFFICE O/P EST MOD 30 MIN: CPT | Mod: 25 | Performed by: INTERNAL MEDICINE

## 2023-11-21 PROCEDURE — 90471 IMMUNIZATION ADMIN: CPT | Performed by: INTERNAL MEDICINE

## 2023-11-21 PROCEDURE — 90686 IIV4 VACC NO PRSV 0.5 ML IM: CPT | Performed by: INTERNAL MEDICINE

## 2023-11-21 PROCEDURE — 3078F DIAST BP <80 MM HG: CPT | Performed by: INTERNAL MEDICINE

## 2023-11-21 ASSESSMENT — FIBROSIS 4 INDEX: FIB4 SCORE: 1.42

## 2023-11-21 NOTE — PROGRESS NOTES
Subjective     Ramona Wilson is a 62 y.o. female who presents with back pain and review labs    HPI      Patient recently had her labs done, she has been working extremely hard on improving her nutrition.  She has been limiting processed foods in her diet and also has eliminated red meat, no added sugars, also has been walking for exercise, as well as working in the yard and has an exercise bike at home during the winter months.  She has had some left-sided mid back pain by her shoulder blade, only worse or noticeable when sitting in a recliner, she has not noticed it at any other time, no radiation down her arms or to her neck, more of an ache, no history of shingles, no pain with deep inspiration, no cough, no shortness of breath at rest or with exertion, pain comes and goes, she does not take any regular anti-inflammatories although she does have meloxicam at home from orthopedics.  No pain with walking or household activities or working in the yard.  No history of trauma to the area.  Mood has been stable on Wellbutrin and Prozac.  Continues to use her sleep apnea machine nightly.  Medications, allergies, medical history, surgical history, social history, family history  reviewed and updated            Current Outpatient Medications   Medication Sig Dispense Refill    meloxicam (MOBIC) 7.5 MG Tab Take 1 Tablet by mouth every day. 30 Tablet 1    tolterodine ER (DETROL-LA) 2 MG CAPSULE SR 24 HR TAKE 1 CAPSULE BY MOUTH EVERY DAY 90 Capsule 2    minocycline (DYNACIN) 100 MG tablet Take 100 mg by mouth every day.      pimecrolimus (ELIDEL) 1 % cream APPLY ON FACE TWICE DAILY      triamcinolone acetonide (KENALOG) 0.1 % Cream PLEASE SEE ATTACHED FOR DETAILED DIRECTIONS      HYDROcodone-acetaminophen (NORCO) 7.5-325 MG tab       buPROPion (WELLBUTRIN XL) 300 MG XL tablet Take 300 mg by mouth every morning.      FLUoxetine (PROZAC) 20 MG Cap Take 1 Capsule by mouth every day. 30 Capsule 5    hydrOXYzine HCl  (ATARAX) 10 MG Tab       clobetasol (TEMOVATE) 0.05 % external solution APPLY 1 APPLICATION TO AFFECTED AREA(S) 2 TIMES A DAY. 50 mL 5    Omega-3 Fatty Acids (FISH OIL) 1000 MG Cap capsule Take 1,000 mg by mouth 3 times a day, with meals.      Cholecalciferol (VITAMIN D) 1000 UNIT CAPS Take  by mouth every day.       No current facility-administered medications for this visit.         Status post cholecystectomy     sleep apnea  10/15/10 positive apnea link formal eval pending   9/3/15 [facility] sleep note long history of snoring, daytime somnolence, will be set up for polysomnogram evaluate for sleep apnea.  11/30/15 sleep study, CPAP was adjusted between 4 and 8, recommend bilevel  11/30/15 sleep study 2-27 minutes total, AHI 0.4, hypopnea index 26, supine AHI 27, mean saturation 93%, minimum saturation 86%, saturations below 89% for 0.9% of sleep time, cpap 4-8 AHI 21.7, mean saturation 93% with minimum saturation 87%  12/18/15 [facility] sleep note repeat cpap titration  2/17/16 [facility] sleep note, start autocpap 9-18, cnox after acclimated  2/15/18 off cpap  6/11/21 order to [facility] to replace cpap equipment, patient needs to see sleep group, referral placed  2/9/22 sleep note on cpap 7, current machine is 7 years old but working well, follow-up 1 year new machine will be ordered at that time  3/17/23 sleep note on nasal cpap will order new machine, compliance 4 hours 87%, pressure settings minimum 10 and maximum 18, leak 30 L/min will change minimum from 10 to 12 due to elevated AHI  7/12/23 sleep note compliance data 100%, AHI 5.2     Preventative health  9/30/15 tdap  9/3/21 dexa LS-1.2,hip+0.1  11/2/22 shingrix second  2/7/23 colon per [facility] negative repeat 10 years  3/16/23 pap done per [facility]  10/11/23 mammogram   11/17/23 vit d 41     Left heel spur    knee arthritis  10/25/22 x-ray knee bilateral mild arthritic changes  10/25/22  orthopedic note x-ray knee bilateral mild arthritic changes, has moderate knee  arthritis, has tried physical therapy, anti-inflammatories, we will proceed with steroid injection today, follow-up 3 to 6 months  7/6/23 [facility] note previous right knee steroid injection 8 months of relief, will repeat again right knee ultrasound-guided triamcinolone 80 mg steroid injection  7/27/23 x-ray right knee mild arthritis  7/27/23  orthopedic note x-ray right knee mild arthritic changes, steroid injection no benefit, some signs consistent with lateral meniscus tear, recommend MRI knee, meloxicam as needed  8/16/23 MRI right knee severe chondromalacia patella, no interval derangement     Impaired glucose metabolism  12/6/16 A1c 5.8%,bs 86  2/16/18 A1c 5.6%  4/26/19 A1c 5.6%  7/28/21 A1c 5.5%  3/30/23 A1c 5.5%  6/24/23 A1c 5.7%  11/17/23 A1c 5.6%     history uti  12/24/19 UTI enterobacter resistant to ampicillin, cefazolin, intermediate nitrofurantoin, sensitive to bactrim, fluoroquinolones given macrodantin initially, changed to bactrim subsequently  2/5/20  urology note urinalysis negative, postvoid residual 51 mL by bladder scan  2/12/20 urology note ultrasound renal left kidney 1.7 cm cyst, no hydronephrosis, no masses, no post void residual, no bladder thickening or mass  3/3/20 c.difficile positive stool treated with oral vancomycin  7/2/20 UTI e.coli sensitive to all antibiotics, will use bactrim  7/16/20 UTI e.coli resistant to bactrim, will use short course of macrobid 5 days  7/28/21 UA negative  5/3/22 UA negative  10/15/22 UA negative  12/7/22 UA 2-5 RBC  12/7/22 urine culture e.coli sensitive to all antibiotics will treat with bactrim x 3 days  3/30/23 UA 2-5 RBC  6/24/23 urine culture e.coli resistant bactrim, sensitive to ampicillin,cefuroxime,mactrobid,cipro  9/29/23 UA negative  11/17/23 urine culture negative    history shoulder pain  8/21/19  [facility] orthopedic note left right rotator cuff impingement    history low back pain  5/07 MRI lumbar spine L5-S1 small disc  protrusion, L4-L5 broad-based disc   9/30/15 xray sacrum and coccyx slight posterior subluxation of the second coccygeal segment which may represent sequela of old injury,no acute sacral or coccygeal fracture,mild degenerative changes of each SI joint  9/30/15 referral to physical therapy  8/25/16  pain note left sacroiliac joint dysfunction, provided left sacroiliac steroidal joint injection under fluoroscopy  9/27/16  pain management procedure note, left L5 dorsal ramus and left S1-S3 nerve radiofrequency ablation under fluoroscopy  10/18/16  pain note s/p left L5S3 FJNA on 9/27/16 80% improvement in symptoms, recommend pennsaid 2% topical, stop naproxen, trial physical therapy, consider ganglion nerve block     history elbow fracture  1/21/15 x-ray left elbow radial head fracture  3/19/15  orthopedic note, x-ray elbow no displacement, healed left radial head fracture     History C. difficile colitis  12/24/19 UTI enterobacter resistant to ampicillin, cefazolin, intermediate nitrofurantoin, sensitive to bactrim, fluoroquinolones given macrodantin initially, changed to bactrim subsequently  1/20 seen by her gynecologist sometime in january  office and given metronidazole for bacterial vaginosis  3/3/20 c.difficile positive stool treated with oral vancomycin    foot arthritis  10/8/13 xray left foot and ankle small calcaneal spur  8/21/19  [facility] orthopedic note left plantar fasciitis  10/13/22 [facility] foot note bilateral plantar fasciitis, peroneal tendinitis, x-rays bilateral feet no fracture or dislocation, slight narrowing bilateral first MTP joint, midfoot degenerative changes     Dyslipidemia  4/09 chol 150,trig, 258, hdl 30, ldl 68  10/10 chol 149,trig 256,hdl 38,ldl 60 start fish oil 2 bid  1/9/14 chol 179,trig 186,hdl 42,ldl 100  9/11/15 chol 175,trig 185,hdl 54,ldl 84  2/16/18 chol 150,trig 158,hdl 49,ldl 75  4/26/19 chol 166,trig 279,hdl  44,ldl 66  7/28/21 chol 163,trig 185,hdl 50,ldl 76 history  3/30/23 chol 181,trig 245,hdl 47,ldl 85; 10 year risk 4.2%  6/24/23 chol 159,trig 200,hdl 49,ldl 77  11/17/23 chol 140,trig 131,hdl 50,ldl 64    Depression  On effexor since 2006, failed celexa  10/10 increase effexor xr to 150 mg and add ativan 0.5 mg prn   10/7/13 continue effexor  mg, add wellbutrin 150 mg daily, PHQ score 17 declined psychotherapy  2/27/17 on effexor 150  mg and wellbutrin 150 mg bid  3/13/17 insurance not cover wellbutrin  mg bid, change to 300 mg qday  11/20/17 referral  at [facility]  2/15/18 PHQ 8 sees  psychiatry on wellbutrin 300 mg, zoloft and adderall 5 mg bid   4/4/19 sees  psychiatry on wellbutrin 300 mg  5/23/19 started on luvox  psychiatry and wellbutrin   8/2/21 on prozac 60 mg and atarax 10 mg prn pm per  psychiatry   3/2/23 PHQ 10 on prozac 60 mg total and wellbutrin 150 mg qday per  psychiatry                Patient Active Problem List   Diagnosis    History of low back pain    Dyslipidemia    Depression    Preventative health care    Sleep apnea    S/P cholecystectomy    Foot arthritis    History of elbow fracture, left    Obesity    Impaired fasting glucose    History of UTI    History of c.difficile colitis    History of shoulder pain    Knee arthritis    History of COVID-19    Stress incontinence    Overactive bladder                Patient Care Team:  Jordan Rand M.D. as PCP - [facility] ([facility] Supplier)      ROS           Objective     LMP 10/10/2010      Physical Exam  Vitals and nursing note reviewed.   Constitutional:       Appearance: Normal appearance.   HENT:      Head: Normocephalic and atraumatic.      Right Ear: External ear normal.      Left Ear: External ear normal.   Eyes:      Conjunctiva/sclera: Conjunctivae normal.   Cardiovascular:      Rate and Rhythm: Normal rate and regular rhythm.      Heart  sounds: Normal heart sounds.   Pulmonary:      Effort: Pulmonary effort is normal.      Breath sounds: Normal breath sounds.   Abdominal:      General: There is no distension.   Musculoskeletal:         General: No swelling.   Skin:     Findings: No bruising.   Neurological:      General: No focal deficit present.      Mental Status: She is alert.   Psychiatric:         Mood and Affect: Mood normal.         Behavior: Behavior normal.       Mid back left thoracic paraspinal tenderness around the T4-T5 region, no rash no evidence of shingles, no spinal tenderness, no crepitus or rub                      Assessment & Plan        Assessment  #1  Musculoskeletal thoracic paraspinal muscle left-sided point tenderness, possible trigger point, although her symptoms only occur when sitting in a recliner    #2 BMI 32.26, she has been able to lose weight as she is working on a good nutrition program and keeping active    #3 dyslipidemia diet controlled most recent labs show improvement in her cholesterol 11/17/23 chol 140,trig 131,hdl 50,ldl 64    #4 history of impaired glucose metabolism, A1c is improved at 5.6% with weight loss    #5 sleep apnea on CPAP      Plan  #1 reviewed labs with her from November 17    #2 continue her excellent work with nutrition and exercise and weight loss    #3 advised her to monitor her back pain to see if it recurs at any other time with any other seating or chair    #4 x-ray thoracic spine    #5 continue CPAP    #6 influenza vaccine today    #7 she can get the RSV and COVID-vaccine at the pharmacy    #8 follow-up next year

## 2023-11-24 ENCOUNTER — HOSPITAL ENCOUNTER (OUTPATIENT)
Dept: RADIOLOGY | Facility: MEDICAL CENTER | Age: 63
End: 2023-11-24
Attending: INTERNAL MEDICINE
Payer: COMMERCIAL

## 2023-11-24 ENCOUNTER — TELEPHONE (OUTPATIENT)
Dept: MEDICAL GROUP | Facility: MEDICAL CENTER | Age: 63
End: 2023-11-24

## 2023-11-24 DIAGNOSIS — M54.9 BACK PAIN, UNSPECIFIED BACK LOCATION, UNSPECIFIED BACK PAIN LATERALITY, UNSPECIFIED CHRONICITY: ICD-10-CM

## 2023-11-24 PROCEDURE — 72070 X-RAY EXAM THORAC SPINE 2VWS: CPT

## 2023-11-24 NOTE — TELEPHONE ENCOUNTER
Notified with x-ray thoracic spine negative for fracture or arthritis, minimal scoliosis, upper lumbar spine some arthritic changes but that is below the area where she has the pain when reclining in her recliner.  Likely more musculoskeletal component, try to avoid sitting in the recliner for too long or she can try some Tylenol or topical Voltaren gel or IcyHot or a heating pad when using her recliner.

## 2023-12-18 DIAGNOSIS — R21 RASH: ICD-10-CM

## 2023-12-18 RX ORDER — CLOBETASOL PROPIONATE 0.46 MG/ML
1 SOLUTION TOPICAL 2 TIMES DAILY PRN
Qty: 50 ML | Refills: 0 | Status: SHIPPED | OUTPATIENT
Start: 2023-12-18

## 2024-02-21 ENCOUNTER — HOSPITAL ENCOUNTER (OUTPATIENT)
Dept: LAB | Facility: MEDICAL CENTER | Age: 64
End: 2024-02-21
Attending: INTERNAL MEDICINE
Payer: COMMERCIAL

## 2024-02-21 LAB
APPEARANCE UR: CLEAR
BACTERIA #/AREA URNS HPF: NEGATIVE /HPF
BILIRUB UR QL STRIP.AUTO: NEGATIVE
COLOR UR: YELLOW
EPI CELLS #/AREA URNS HPF: NEGATIVE /HPF
GLUCOSE UR STRIP.AUTO-MCNC: NEGATIVE MG/DL
HYALINE CASTS #/AREA URNS LPF: NORMAL /LPF
KETONES UR STRIP.AUTO-MCNC: NEGATIVE MG/DL
LEUKOCYTE ESTERASE UR QL STRIP.AUTO: NEGATIVE
MICRO URNS: ABNORMAL
NITRITE UR QL STRIP.AUTO: NEGATIVE
PH UR STRIP.AUTO: 6 [PH] (ref 5–8)
PROT UR QL STRIP: NEGATIVE MG/DL
RBC # URNS HPF: NORMAL /HPF
RBC UR QL AUTO: ABNORMAL
SP GR UR STRIP.AUTO: 1.01
UROBILINOGEN UR STRIP.AUTO-MCNC: 0.2 MG/DL
WBC #/AREA URNS HPF: NORMAL /HPF

## 2024-02-21 PROCEDURE — 81001 URINALYSIS AUTO W/SCOPE: CPT

## 2024-02-22 ENCOUNTER — TELEPHONE (OUTPATIENT)
Dept: MEDICAL GROUP | Facility: MEDICAL CENTER | Age: 64
End: 2024-02-22
Payer: COMMERCIAL

## 2024-02-22 NOTE — TELEPHONE ENCOUNTER
----- Message from Jordan Rand M.D. sent at 2/21/2024 10:28 PM PST -----  Please notify the patient that her urine test is negative for infection

## 2024-02-28 ENCOUNTER — HOSPITAL ENCOUNTER (OUTPATIENT)
Dept: LAB | Facility: MEDICAL CENTER | Age: 64
End: 2024-02-28
Attending: INTERNAL MEDICINE
Payer: COMMERCIAL

## 2024-02-28 DIAGNOSIS — Z00.00 PREVENTATIVE HEALTH CARE: ICD-10-CM

## 2024-02-28 LAB
25(OH)D3 SERPL-MCNC: 44 NG/ML (ref 30–100)
ALBUMIN SERPL BCP-MCNC: 4.4 G/DL (ref 3.2–4.9)
ALBUMIN/GLOB SERPL: 1.6 G/DL
ALP SERPL-CCNC: 66 U/L (ref 30–99)
ALT SERPL-CCNC: 24 U/L (ref 2–50)
ANION GAP SERPL CALC-SCNC: 15 MMOL/L (ref 7–16)
AST SERPL-CCNC: 19 U/L (ref 12–45)
BASOPHILS # BLD AUTO: 0.7 % (ref 0–1.8)
BASOPHILS # BLD: 0.05 K/UL (ref 0–0.12)
BILIRUB SERPL-MCNC: 0.3 MG/DL (ref 0.1–1.5)
BUN SERPL-MCNC: 19 MG/DL (ref 8–22)
CALCIUM ALBUM COR SERPL-MCNC: 9.2 MG/DL (ref 8.5–10.5)
CALCIUM SERPL-MCNC: 9.5 MG/DL (ref 8.5–10.5)
CHLORIDE SERPL-SCNC: 101 MMOL/L (ref 96–112)
CHOLEST SERPL-MCNC: 179 MG/DL (ref 100–199)
CO2 SERPL-SCNC: 22 MMOL/L (ref 20–33)
CREAT SERPL-MCNC: 0.83 MG/DL (ref 0.5–1.4)
EOSINOPHIL # BLD AUTO: 0.14 K/UL (ref 0–0.51)
EOSINOPHIL NFR BLD: 2 % (ref 0–6.9)
ERYTHROCYTE [DISTWIDTH] IN BLOOD BY AUTOMATED COUNT: 42.5 FL (ref 35.9–50)
EST. AVERAGE GLUCOSE BLD GHB EST-MCNC: 111 MG/DL
GFR SERPLBLD CREATININE-BSD FMLA CKD-EPI: 79 ML/MIN/1.73 M 2
GLOBULIN SER CALC-MCNC: 2.8 G/DL (ref 1.9–3.5)
GLUCOSE SERPL-MCNC: 106 MG/DL (ref 65–99)
HBA1C MFR BLD: 5.5 % (ref 4–5.6)
HCT VFR BLD AUTO: 47 % (ref 37–47)
HDLC SERPL-MCNC: 55 MG/DL
HGB BLD-MCNC: 15.8 G/DL (ref 12–16)
IMM GRANULOCYTES # BLD AUTO: 0.01 K/UL (ref 0–0.11)
IMM GRANULOCYTES NFR BLD AUTO: 0.1 % (ref 0–0.9)
LDLC SERPL CALC-MCNC: 93 MG/DL
LYMPHOCYTES # BLD AUTO: 2.89 K/UL (ref 1–4.8)
LYMPHOCYTES NFR BLD: 40.6 % (ref 22–41)
MCH RBC QN AUTO: 30.3 PG (ref 27–33)
MCHC RBC AUTO-ENTMCNC: 33.6 G/DL (ref 32.2–35.5)
MCV RBC AUTO: 90.2 FL (ref 81.4–97.8)
MONOCYTES # BLD AUTO: 0.58 K/UL (ref 0–0.85)
MONOCYTES NFR BLD AUTO: 8.2 % (ref 0–13.4)
NEUTROPHILS # BLD AUTO: 3.44 K/UL (ref 1.82–7.42)
NEUTROPHILS NFR BLD: 48.4 % (ref 44–72)
NRBC # BLD AUTO: 0 K/UL
NRBC BLD-RTO: 0 /100 WBC (ref 0–0.2)
PLATELET # BLD AUTO: 240 K/UL (ref 164–446)
PMV BLD AUTO: 11.7 FL (ref 9–12.9)
POTASSIUM SERPL-SCNC: 3.8 MMOL/L (ref 3.6–5.5)
PROT SERPL-MCNC: 7.2 G/DL (ref 6–8.2)
RBC # BLD AUTO: 5.21 M/UL (ref 4.2–5.4)
SODIUM SERPL-SCNC: 138 MMOL/L (ref 135–145)
TRIGL SERPL-MCNC: 154 MG/DL (ref 0–149)
TSH SERPL DL<=0.005 MIU/L-ACNC: 2.13 UIU/ML (ref 0.38–5.33)
WBC # BLD AUTO: 7.1 K/UL (ref 4.8–10.8)

## 2024-02-28 PROCEDURE — 85025 COMPLETE CBC W/AUTO DIFF WBC: CPT

## 2024-02-28 PROCEDURE — 80053 COMPREHEN METABOLIC PANEL: CPT

## 2024-02-28 PROCEDURE — 36415 COLL VENOUS BLD VENIPUNCTURE: CPT

## 2024-02-28 PROCEDURE — 84443 ASSAY THYROID STIM HORMONE: CPT

## 2024-02-28 PROCEDURE — 83036 HEMOGLOBIN GLYCOSYLATED A1C: CPT

## 2024-02-28 PROCEDURE — 82306 VITAMIN D 25 HYDROXY: CPT

## 2024-02-28 PROCEDURE — 80061 LIPID PANEL: CPT

## 2024-03-09 ENCOUNTER — HOSPITAL ENCOUNTER (OUTPATIENT)
Facility: MEDICAL CENTER | Age: 64
End: 2024-03-09
Attending: NURSE PRACTITIONER
Payer: COMMERCIAL

## 2024-03-09 ENCOUNTER — OFFICE VISIT (OUTPATIENT)
Dept: URGENT CARE | Facility: CLINIC | Age: 64
End: 2024-03-09
Payer: COMMERCIAL

## 2024-03-09 VITALS
HEIGHT: 64 IN | BODY MASS INDEX: 32.44 KG/M2 | RESPIRATION RATE: 16 BRPM | SYSTOLIC BLOOD PRESSURE: 120 MMHG | OXYGEN SATURATION: 97 % | DIASTOLIC BLOOD PRESSURE: 70 MMHG | TEMPERATURE: 98.1 F | WEIGHT: 190 LBS | HEART RATE: 80 BPM

## 2024-03-09 DIAGNOSIS — N30.01 ACUTE CYSTITIS WITH HEMATURIA: ICD-10-CM

## 2024-03-09 LAB
APPEARANCE UR: CLEAR
BILIRUB UR STRIP-MCNC: NORMAL MG/DL
COLOR UR AUTO: YELLOW
GLUCOSE UR STRIP.AUTO-MCNC: NORMAL MG/DL
KETONES UR STRIP.AUTO-MCNC: NORMAL MG/DL
LEUKOCYTE ESTERASE UR QL STRIP.AUTO: NORMAL
NITRITE UR QL STRIP.AUTO: NORMAL
PH UR STRIP.AUTO: 5.5 [PH] (ref 5–8)
PROT UR QL STRIP: NORMAL MG/DL
RBC UR QL AUTO: NORMAL
SP GR UR STRIP.AUTO: 1.02
UROBILINOGEN UR STRIP-MCNC: 0.2 MG/DL

## 2024-03-09 PROCEDURE — 87086 URINE CULTURE/COLONY COUNT: CPT

## 2024-03-09 PROCEDURE — 3078F DIAST BP <80 MM HG: CPT | Performed by: NURSE PRACTITIONER

## 2024-03-09 PROCEDURE — 99213 OFFICE O/P EST LOW 20 MIN: CPT | Performed by: NURSE PRACTITIONER

## 2024-03-09 PROCEDURE — 81002 URINALYSIS NONAUTO W/O SCOPE: CPT | Performed by: NURSE PRACTITIONER

## 2024-03-09 PROCEDURE — 3074F SYST BP LT 130 MM HG: CPT | Performed by: NURSE PRACTITIONER

## 2024-03-09 RX ORDER — SULFAMETHOXAZOLE AND TRIMETHOPRIM 800; 160 MG/1; MG/1
1 TABLET ORAL EVERY 12 HOURS
Qty: 10 TABLET | Refills: 0 | Status: SHIPPED | OUTPATIENT
Start: 2024-03-09 | End: 2024-03-14

## 2024-03-09 RX ORDER — PHENAZOPYRIDINE HYDROCHLORIDE 200 MG/1
200 TABLET, FILM COATED ORAL 3 TIMES DAILY
Qty: 6 TABLET | Refills: 0 | Status: SHIPPED | OUTPATIENT
Start: 2024-03-09 | End: 2024-03-11

## 2024-03-09 ASSESSMENT — ENCOUNTER SYMPTOMS
VOMITING: 0
NAUSEA: 0
FEVER: 0
BACK PAIN: 1
ABDOMINAL PAIN: 0
CHILLS: 0

## 2024-03-09 ASSESSMENT — FIBROSIS 4 INDEX: FIB4 SCORE: 1.02

## 2024-03-09 NOTE — PROGRESS NOTES
Subjective:     Ramona Wilson is a 63 y.o. female who presents for UTI (This morning, frequency, pain with urination, blood in urine. )      States she had significant bilateral lower back pain this morning. Has had improvement, stating it's not as much. Denies vaginal bleeding. Hx of UTIs, has never had visible hematuria. Took D-mannous.     UTI  The current episode started today. The problem has been gradually improving. Associated symptoms include urinary symptoms. Pertinent negatives include no abdominal pain, chills, fever, nausea or vomiting.       Past Medical History:   Diagnosis Date    Anxiety 2009    Blood in urine     Chickenpox     Chronic low back pain 2009    Constipation     Depression     Diarrhea     Dyslipidemia 2009    Eczema 2009    Hypovitaminosis D 2009    Obesity     RLS (restless legs syndrome)     Sleep apnea     Surveillance for birth control, oral contraceptives 2009    Wears glasses        Past Surgical History:   Procedure Laterality Date    BREAST BIOPSY Right 2021    benign    [place] BREAST REDUCTION  2011    MAMMOPLASTY REDUCTION  2010    Performed by LUANNE LARRY at SURGERY [facility] ORS    PRIMARY C SECTION  1994        MOSHE BY LAPAROSCOPY         Social History     Socioeconomic History    Marital status:      Spouse name: Not on file    Number of children: Not on file    Years of education: Not on file    Highest education level: 12th grade   Occupational History    Not on file   Tobacco Use    Smoking status: Never    Smokeless tobacco: Never   Vaping Use    Vaping Use: Never used   Substance and Sexual Activity    Alcohol use: No     Alcohol/week: 0.0 oz    Drug use: No    Sexual activity: Yes     Partners: Male   Other Topics Concern    Not on file   Social History Narrative    Not on file     Social Determinants of Health     Financial Resource Strain: Not on file   Food Insecurity: No Food Insecurity  (2/27/2023)    Hunger Vital Sign     Worried About Running Out of Food in the Last Year: Never true     Ran Out of Food in the Last Year: Never true   Transportation Needs: No Transportation Needs (2/27/2023)    PRAPARE - Transportation     Lack of Transportation (Medical): No     Lack of Transportation (Non-Medical): No   Physical Activity: Sufficiently Active (2/27/2023)    Exercise Vital Sign     Days of Exercise per Week: 5 days     Minutes of Exercise per Session: 70 min   Stress: Stress Concern Present (2/27/2023)    [employer] - Occupational Stress Questionnaire     Feeling of Stress : Very much   Social Connections: Unknown (2/27/2023)    Social Connection and Isolation Panel [NHANES]     Frequency of Communication with Friends and Family: Once a week     Frequency of Social Gatherings with Friends and Family: Patient declined     Attends Jewish Services: Never     Active Member of Clubs or Organizations: No     Attends Club or Organization Meetings: Never     Marital Status:    Intimate Partner Violence: Not on file   Housing Stability: Low Risk  (2/27/2023)    Housing Stability Vital Sign     Unable to Pay for Housing in the Last Year: No     Number of Places Lived in the Last Year: 1     Unstable Housing in the Last Year: No        Family History   Problem Relation Age of Onset    Cancer Mother         lung cancer,osteoporosis    Hypertension Brother     Heart Disease Brother     Alcohol abuse Brother     Diabetes Father     Lung Disease Father     Sleep Apnea Father     Cancer Maternal Aunt     Cancer Maternal Grandmother     Alcohol/Drug Maternal Grandfather     Stroke Paternal Grandfather     Cancer Maternal Aunt         Allergies   Allergen Reactions    Amoxicillin Hives    Other Drug      Cillin Family       Review of Systems   Constitutional:  Negative for chills and fever.   Gastrointestinal:  Negative for abdominal pain, nausea and vomiting.   Genitourinary:  " Positive for dysuria, frequency, hematuria and urgency.   Musculoskeletal:  Positive for back pain.   All other systems reviewed and are negative.       Objective:   /70   Pulse 80   Temp 36.7 °C (98.1 °F) (Temporal)   Resp 16   Ht 1.626 m (5' 4\")   Wt 86.2 kg (190 lb)   LMP 10/10/2010   SpO2 97%   BMI 32.61 kg/m²     Physical Exam  Vitals reviewed.   Constitutional:       General: She is not in acute distress.     Appearance: She is not toxic-appearing.   Cardiovascular:      Rate and Rhythm: Normal rate.   Pulmonary:      Effort: Pulmonary effort is normal.   Abdominal:      Palpations: Abdomen is soft.      Tenderness: There is no abdominal tenderness. There is no right CVA tenderness or left CVA tenderness.   Skin:     General: Skin is warm and dry.   Neurological:      Mental Status: She is alert and oriented to person, place, and time.         Assessment/Plan:   1. Acute cystitis with hematuria  - POCT Urinalysis  - URINE CULTURE(NEW); Future  - sulfamethoxazole-trimethoprim (BACTRIM DS) 800-160 MG tablet; Take 1 Tablet by mouth every 12 hours for 5 days.  Dispense: 10 Tablet; Refill: 0  - phenazopyridine (PYRIDIUM) 200 MG Tab; Take 1 Tablet by mouth 3 times a day for 2 days.  Dispense: 6 Tablet; Refill: 0    Results for orders placed or performed in visit on 03/09/24   POCT Urinalysis   Result Value Ref Range    POC Color YELLOW Negative    POC Appearance CLEAR Negative    POC Glucose NEG Negative mg/dL    POC Bilirubin NEG Negative mg/dL    POC Ketones NEG Negative mg/dL    POC Specific Gravity 1.020 <1.005 - >1.030    POC Blood LARGE Negative    POC Urine PH 5.5 5.0 - 8.0    POC Protein TRACE Negative mg/dL    POC Urobiligen 0.2 Negative (0.2) mg/dL    POC Nitrites NEG Negative    POC Leukocyte Esterase SMALL Negative   -Oral Hydration: Drink plenty of water.  -Take antibiotic as prescribed.  -Follow up with PCP.    Follow up urgently for new or persistent abdominal pain, flank pain, " difficulty with urination, fevers, vomiting, weakness, tachycardia, or any other concerns.    -Stable vitals. Afebrile. No CVA or abdominal tenderness to palpation. Lower back pain has improved. Differential to include possible passing of renal calculi. Advised initiating treatment for UTI, with follow up for reoccurrence of back or abdominal pain, as imaging could be indicated at that time. Given ED precautions.     Differential diagnosis, natural history, supportive care, and indications for immediate follow-up discussed.

## 2024-03-11 LAB
BACTERIA UR CULT: NORMAL
SIGNIFICANT IND 70042: NORMAL
SITE SITE: NORMAL
SOURCE SOURCE: NORMAL

## 2024-04-26 DIAGNOSIS — R21 RASH: ICD-10-CM

## 2024-04-27 RX ORDER — CLOBETASOL PROPIONATE 0.46 MG/ML
1 SOLUTION TOPICAL 2 TIMES DAILY PRN
Qty: 50 ML | Refills: 1 | Status: SHIPPED | OUTPATIENT
Start: 2024-04-27

## 2024-05-01 ENCOUNTER — APPOINTMENT (RX ONLY)
Dept: URBAN - METROPOLITAN AREA CLINIC 35 | Facility: CLINIC | Age: 64
Setting detail: DERMATOLOGY
End: 2024-05-01

## 2024-05-01 DIAGNOSIS — L82.1 OTHER SEBORRHEIC KERATOSIS: ICD-10-CM

## 2024-05-01 DIAGNOSIS — D22 MELANOCYTIC NEVI: ICD-10-CM

## 2024-05-01 DIAGNOSIS — L81.4 OTHER MELANIN HYPERPIGMENTATION: ICD-10-CM

## 2024-05-01 DIAGNOSIS — L40.0 PSORIASIS VULGARIS: ICD-10-CM | Status: INADEQUATELY CONTROLLED

## 2024-05-01 DIAGNOSIS — N39.3 STRESS INCONTINENCE: ICD-10-CM

## 2024-05-01 DIAGNOSIS — Z71.89 OTHER SPECIFIED COUNSELING: ICD-10-CM

## 2024-05-01 PROBLEM — D22.5 MELANOCYTIC NEVI OF TRUNK: Status: ACTIVE | Noted: 2024-05-01

## 2024-05-01 PROCEDURE — ? PRESCRIPTION

## 2024-05-01 PROCEDURE — ? COUNSELING

## 2024-05-01 PROCEDURE — 99214 OFFICE O/P EST MOD 30 MIN: CPT

## 2024-05-01 PROCEDURE — ? TREATMENT REGIMEN

## 2024-05-01 RX ORDER — TOLTERODINE 2 MG/1
2 CAPSULE, EXTENDED RELEASE ORAL DAILY
Qty: 90 CAPSULE | Refills: 1 | Status: SHIPPED | OUTPATIENT
Start: 2024-05-01

## 2024-05-01 RX ORDER — FLUOCINOLONE ACETONIDE 0.11 MG/ML
OIL TOPICAL
Qty: 118.28 | Refills: 0 | Status: ERX | COMMUNITY
Start: 2024-05-01

## 2024-05-01 RX ADMIN — FLUOCINOLONE ACETONIDE SMALL AMOUNT: 0.11 OIL TOPICAL at 00:00

## 2024-05-01 ASSESSMENT — LOCATION ZONE DERM
LOCATION ZONE: LEG
LOCATION ZONE: EAR
LOCATION ZONE: SCALP
LOCATION ZONE: TRUNK
LOCATION ZONE: ARM

## 2024-05-01 ASSESSMENT — BSA PSORIASIS: % BODY COVERED IN PSORIASIS: 1

## 2024-05-01 ASSESSMENT — LOCATION DETAILED DESCRIPTION DERM
LOCATION DETAILED: LEFT MEDIAL UPPER BACK
LOCATION DETAILED: RIGHT ANTERIOR PROXIMAL THIGH
LOCATION DETAILED: RIGHT POSTERIOR SHOULDER
LOCATION DETAILED: RIGHT SUPERIOR CRUS OF ANTIHELIX
LOCATION DETAILED: MID-OCCIPITAL SCALP
LOCATION DETAILED: RIGHT SUPERIOR UPPER BACK

## 2024-05-01 ASSESSMENT — LOCATION SIMPLE DESCRIPTION DERM
LOCATION SIMPLE: RIGHT EAR
LOCATION SIMPLE: POSTERIOR SCALP
LOCATION SIMPLE: RIGHT UPPER BACK
LOCATION SIMPLE: RIGHT THIGH
LOCATION SIMPLE: RIGHT SHOULDER
LOCATION SIMPLE: LEFT UPPER BACK

## 2024-05-01 NOTE — PROCEDURE: TREATMENT REGIMEN
Action 1: Continue
Initiate Regimen: Dermasmooth oil alternating with clobetasol solutin foot up to 6 weeks alternating with 2 weeks off
Plan: Denies psoriatic arthritis signs, just some stiffness in left elbow and right knee but no swelling
Detail Level: Zone

## 2024-05-16 ENCOUNTER — TELEPHONE (OUTPATIENT)
Dept: MEDICAL GROUP | Facility: MEDICAL CENTER | Age: 64
End: 2024-05-16

## 2024-05-16 ENCOUNTER — OFFICE VISIT (OUTPATIENT)
Dept: MEDICAL GROUP | Facility: MEDICAL CENTER | Age: 64
End: 2024-05-16
Payer: COMMERCIAL

## 2024-05-16 VITALS
SYSTOLIC BLOOD PRESSURE: 144 MMHG | HEIGHT: 64 IN | RESPIRATION RATE: 16 BRPM | TEMPERATURE: 97.5 F | BODY MASS INDEX: 32.27 KG/M2 | DIASTOLIC BLOOD PRESSURE: 78 MMHG | OXYGEN SATURATION: 97 % | HEART RATE: 89 BPM | WEIGHT: 189 LBS

## 2024-05-16 DIAGNOSIS — E66.9 CLASS 1 OBESITY WITHOUT SERIOUS COMORBIDITY WITH BODY MASS INDEX (BMI) OF 34.0 TO 34.9 IN ADULT, UNSPECIFIED OBESITY TYPE: ICD-10-CM

## 2024-05-16 DIAGNOSIS — E78.5 DYSLIPIDEMIA: Chronic | ICD-10-CM

## 2024-05-16 DIAGNOSIS — F32.A DEPRESSION, UNSPECIFIED DEPRESSION TYPE: ICD-10-CM

## 2024-05-16 DIAGNOSIS — G47.30 SLEEP APNEA, UNSPECIFIED TYPE: ICD-10-CM

## 2024-05-16 PROCEDURE — 99214 OFFICE O/P EST MOD 30 MIN: CPT | Performed by: INTERNAL MEDICINE

## 2024-05-16 PROCEDURE — 3077F SYST BP >= 140 MM HG: CPT | Performed by: INTERNAL MEDICINE

## 2024-05-16 PROCEDURE — 3078F DIAST BP <80 MM HG: CPT | Performed by: INTERNAL MEDICINE

## 2024-05-16 RX ORDER — SEMAGLUTIDE 0.68 MG/ML
0.25 INJECTION, SOLUTION SUBCUTANEOUS
Qty: 3 ML | Refills: 11 | Status: SHIPPED | OUTPATIENT
Start: 2024-05-16

## 2024-05-16 ASSESSMENT — PATIENT HEALTH QUESTIONNAIRE - PHQ9
SUM OF ALL RESPONSES TO PHQ QUESTIONS 1-9: 13
CLINICAL INTERPRETATION OF PHQ2 SCORE: 3
5. POOR APPETITE OR OVEREATING: 2 - MORE THAN HALF THE DAYS

## 2024-05-16 ASSESSMENT — FIBROSIS 4 INDEX: FIB4 SCORE: 1.02

## 2024-05-16 NOTE — PROGRESS NOTES
Subjective     Ramona Wilson is a 63 y.o. female who presents with medications Follow-Up            HPI    Patient here for follow-up weight loss medication, her insurance she did some investigation with her health insurance regarding her pharmacy benefit plan and unfortunately wegovy and mounjaro are not covered medications on her plan.  She has been working on her nutrition and exercise program.  She is exercising five days per week doing 20 minutes of walking and on days when she does not walk she will do the stationary bike for 20 minutes, she also does yard work up to 2 to 4 hours a day as well.  She tries to keep well-hydrated drinking water.  Following a good nutritional program, minimal red meat intake perhaps once per month, does rarely eat out, does all cooking at home minimizing processed food intake, low salt diet, does snack on popcorn, veggies, on occasion potato chips, she does eat greek yogurt, kind granola, dried cranberries/raisins (lower sugar) for breakfast and for lunch wrap low carb tortilla with ham, lettuce, and for dinner chicken and veggies and limiting carbohydrate intake.  Previously has tried phentermine but that did make her anxious.  No soda, water during the day, no etoh   Followed by psychiatry  tapered off prozac in april, still on wellbutrin  mg qday and started on cymbalta 30 mg mood has been stable, no side effects of nausea, lightheadedness, insomnia since starting the Cymbalta.  Sleep apnea stable on CPAP  Medications, allergies, medical history, surgical history, social history, family history  reviewed and updated        Current Outpatient Medications   Medication Sig Dispense Refill    tolterodine ER (DETROL-LA) 2 MG CAPSULE SR 24 HR TAKE 1 CAPSULE BY MOUTH EVERY DAY 90 Capsule 1    clobetasol (TEMOVATE) 0.05 % external solution APPLY 1 APPLICATION TOPICALLY 2 TIMES A DAY AS NEEDED (RASH). 50 mL 1    triamcinolone acetonide (KENALOG) 0.1 % Cream PLEASE  SEE ATTACHED FOR DETAILED DIRECTIONS      buPROPion (WELLBUTRIN XL) 300 MG XL tablet Take 300 mg by mouth every morning.      Omega-3 Fatty Acids (FISH OIL) 1000 MG Cap capsule Take 1,000 mg by mouth 3 times a day, with meals.      Cholecalciferol (VITAMIN D) 1000 UNIT CAPS Take  by mouth every day.       No current facility-administered medications for this visit.         Status post cholecystectomy     sleep apnea  10/15/10 positive apnea link formal eval pending   9/3/15 [facility] sleep note long history of snoring, daytime somnolence, will be set up for polysomnogram evaluate for sleep apnea.  11/30/15 sleep study, CPAP was adjusted between 4 and 8, recommend bilevel  11/30/15 sleep study 2-27 minutes total, AHI 0.4, hypopnea index 26, supine AHI 27, mean saturation 93%, minimum saturation 86%, saturations below 89% for 0.9% of sleep time, cpap 4-8 AHI 21.7, mean saturation 93% with minimum saturation 87%  12/18/15 [facility] sleep note repeat cpap titration  2/17/16 [facility] sleep note, start autocpap 9-18, cnox after acclimated  2/15/18 off cpap  6/11/21 order to [facility] to replace cpap equipment, patient needs to see sleep group, referral placed  2/9/22 sleep note on cpap 7, current machine is 7 years old but working well, follow-up 1 year new machine will be ordered at that time  3/17/23 sleep note on nasal cpap will order new machine, compliance 4 hours 87%, pressure settings minimum 10 and maximum 18, leak 30 L/min will change minimum from 10 to 12 due to elevated AHI  7/12/23 sleep note compliance data 100%, AHI 5.2     Preventative health  9/30/15 tdap  9/3/21 dexa LS-1.2,hip+0.1  11/2/22 shingrix second  2/7/23 colon per [facility] negative repeat 10 years  3/16/23 pap done per [facility]  10/11/23 mammogram   11/21/23 flu   2/28/24 vit d 44    overactive bladder  3/16/23 started on detrol by [facility]     obesituy  9/30/15 BMI 32.4 declines nutrition consultation  12/6/16 BMI 36.9 declines nutrition counseling  2/15/18 BMI 34.2  declines nutrition counseling  4/4/19 BMI 35.6   8/2/21 BMI 33.3 start phentermine 15 mg  12/6/21 BMI 33.6 on phentermine 15 mg intermittently, states that it does make her somewhat anxious, she will try taking this later in the day but not too late as this may affect her sleep   3/2/23 BMI 33.3 start wegovy 0.25 mg   11/21/23 BMI 32.26 wegovy not covered has lost weight working on a good nutrition program     knee arthritis  10/25/22 x-ray knee bilateral mild arthritic changes  10/25/22  orthopedic note x-ray knee bilateral mild arthritic changes, has moderate knee arthritis, has tried physical therapy, anti-inflammatories, we will proceed with steroid injection today, follow-up 3 to 6 months  7/6/23 [facility] note previous right knee steroid injection 8 months of relief, will repeat again right knee ultrasound-guided triamcinolone 80 mg steroid injection  7/27/23 x-ray right knee mild arthritis  7/27/23  orthopedic note x-ray right knee mild arthritic changes, steroid injection no benefit, some signs consistent with lateral meniscus tear, recommend MRI knee, meloxicam as needed  8/16/23 MRI right knee severe chondromalacia patella, no interval derangement     Impaired glucose metabolism  12/6/16 A1c 5.8%,bs 86  2/16/18 A1c 5.6%  4/26/19 A1c 5.6%  7/28/21 A1c 5.5%  3/30/23 A1c 5.5%  6/24/23 A1c 5.7%  11/17/23 A1c 5.6%  2/28/24 A1c 5.5%     history uti  12/24/19 UTI enterobacter resistant to ampicillin, cefazolin, intermediate nitrofurantoin, sensitive to bactrim, fluoroquinolones given macrodantin initially, changed to bactrim subsequently  2/5/20  urology note urinalysis negative, postvoid residual 51 mL by bladder scan  2/12/20 urology note ultrasound renal left kidney 1.7 cm cyst, no hydronephrosis, no masses, no post void residual, no bladder thickening or mass  3/3/20 c.difficile positive stool treated with oral vancomycin  7/2/20 UTI e.coli sensitive to all antibiotics, will use  bactrim  7/16/20 UTI e.coli resistant to bactrim, will use short course of macrobid 5 days  7/28/21 UA negative  5/3/22 UA negative  10/15/22 UA negative  12/7/22 UA 2-5 RBC  12/7/22 urine culture e.coli sensitive to all antibiotics will treat with bactrim x 3 days  3/30/23 UA 2-5 RBC  6/24/23 urine culture e.coli resistant bactrim, sensitive to ampicillin,cefuroxime,mactrobid,cipro  9/29/23 UA negative  11/17/23 urine culture negative  2/21/24 UA 0-2 RBC negative     history shoulder pain  8/21/19 dr.albertson G[facility]O orthopedic note left right rotator cuff impingement     history low back pain  5/07 MRI lumbar spine L5-S1 small disc protrusion, L4-L5 broad-based disc   9/30/15 xray sacrum and coccyx slight posterior subluxation of the second coccygeal segment which may represent sequela of old injury,no acute sacral or coccygeal fracture,mild degenerative changes of each SI joint  9/30/15 referral to physical therapy  8/25/16  pain note left sacroiliac joint dysfunction, provided left sacroiliac steroidal joint injection under fluoroscopy  9/27/16  pain management procedure note, left L5 dorsal ramus and left S1-S3 nerve radiofrequency ablation under fluoroscopy  10/18/16  pain note s/p left L5S3 FJNA on 9/27/16 80% improvement in symptoms, recommend pennsaid 2% topical, stop naproxen, trial physical therapy, consider ganglion nerve block  11/21/23 left mid musculoskeletal thoracic paraspinal pain, x-ray ordered, only happens when in a recliner  11/24/23 x-ray thoracic spine negative for fracture, minimal scoliosis, upper lumbar facet arthropathy with degenerative subluxations     history elbow fracture  1/21/15 x-ray left elbow radial head fracture  3/19/15  orthopedic note, x-ray elbow no displacement, healed left radial head fracture     History C. difficile colitis  12/24/19 UTI enterobacter resistant to ampicillin, cefazolin, intermediate nitrofurantoin, sensitive  to bactrim, fluoroquinolones given macrodantin initially, changed to bactrim subsequently  1/20 seen by her gynecologist sometime in january  office and given metronidazole for bacterial vaginosis  3/3/20 c.difficile positive stool treated with oral vancomycin     foot arthritis  10/8/13 xray left foot and ankle small calcaneal spur  8/21/19  [facility] orthopedic note left plantar fasciitis  10/13/22 [facility] foot note bilateral plantar fasciitis, peroneal tendinitis, x-rays bilateral feet no fracture or dislocation, slight narrowing bilateral first MTP joint, midfoot degenerative changes     Dyslipidemia  4/09 chol 150,trig, 258, hdl 30, ldl 68  10/10 chol 149,trig 256,hdl 38,ldl 60 start fish oil 2 bid  1/9/14 chol 179,trig 186,hdl 42,ldl 100  9/11/15 chol 175,trig 185,hdl 54,ldl 84  2/16/18 chol 150,trig 158,hdl 49,ldl 75  4/26/19 chol 166,trig 279,hdl 44,ldl 66  7/28/21 chol 163,trig 185,hdl 50,ldl 76 history  3/30/23 chol 181,trig 245,hdl 47,ldl 85; 10 year risk 4.2%  6/24/23 chol 159,trig 200,hdl 49,ldl 77  11/17/23 chol 140,trig 131,hdl 50,ldl 64  2/28/24 chol 179,trig 154,hdl 55,ldl 93; 10 year risk 3.5%     Depression  On effexor since 2006, failed celexa  10/10 increase effexor xr to 150 mg and add ativan 0.5 mg prn   10/7/13 continue effexor  mg, add wellbutrin 150 mg daily, PHQ score 17 declined psychotherapy  2/27/17 on effexor 150  mg and wellbutrin 150 mg bid  3/13/17 insurance not cover wellbutrin  mg bid, change to 300 mg qday  11/20/17 referral  at [facility]  2/15/18 PHQ 8 sees  psychiatry on wellbutrin 300 mg, zoloft and adderall 5 mg bid   4/4/19 sees  psychiatry on wellbutrin 300 mg  5/23/19 started on luvox  psychiatry and wellbutrin   8/2/21 on prozac 60 mg and atarax 10 mg prn pm per  psychiatry   3/2/23 PHQ 10 on prozac 60 mg total and wellbutrin 150 mg qday per  psychiatry           Patient Active Problem List   Diagnosis    History of low back pain    Dyslipidemia    Depression    Preventative health care    Sleep apnea    S/P cholecystectomy    Foot arthritis    History of elbow fracture, left    Obesity    Impaired fasting glucose    History of UTI    History of c.difficile colitis    History of shoulder pain    Knee arthritis    History of COVID-19    Stress incontinence    Overactive bladder     Depression Screening  Little interest or pleasure in doing things?  1 - several days  Feeling down, depressed , or hopeless? 2 - more than half the days  Trouble falling or staying asleep, or sleeping too much?  2 - more than half the days  Feeling tired or having little energy?  2 - more than half the days  Poor appetite or overeating?  2 - more than half the days  Feeling bad about yourself - or that you are a failure or have let yourself or your family down? 3 - nearly every day  Trouble concentrating on things, such as reading the newspaper or watching television? 1 - several days  Moving or speaking so slowly that other people could have noticed.  Or the opposite - being so fidgety or restless that you have been moving around a lot more than usual?  0 - not at all  Thoughts that you would be better off dead, or of hurting yourself?  0 - not at all  Patient Health Questionnaire Score: 13             Patient Care Team:  Jordan Rand M.D. as PCP - [facility] ([facility] Supplier)    ROS           Objective          Physical Exam  Vitals and nursing note reviewed.   Constitutional:       Appearance: Normal appearance.   HENT:      Head: Normocephalic and atraumatic.      Right Ear: External ear normal.      Left Ear: External ear normal.   Eyes:      Conjunctiva/sclera: Conjunctivae normal.   Cardiovascular:      Rate and Rhythm: Normal rate and regular rhythm.      Heart sounds: Normal heart sounds.   Pulmonary:      Effort: Pulmonary effort is normal.      Breath sounds: Normal breath  sounds.   Abdominal:      General: There is no distension.   Musculoskeletal:         General: No swelling.   Skin:     Coloration: Skin is not jaundiced.   Neurological:      General: No focal deficit present.      Mental Status: She is alert.   Psychiatric:         Mood and Affect: Mood normal.               Assessment & Plan        Assessment  #1  BMI 32.4 has been working nutrition limiting sweets, candies, processed foods, carbohydrate portion serving sizes, has been exercising on a regular basis, intolerant to phentermine previously, Wegovy and Zepbound not covered under her medication pharmacy benefit plan.    #2 history of impaired glucose metabolism most recent A1c 5.5% in February    #3 sleep apnea on CPAP    #4 dyslipidemia 2/28/24 chol 179,trig 154,hdl 55,ldl 93; 10 year risk 3.5%    #5 depression followed by psychiatry, PHQ 13 sees  5/16/24 PHQ 13 tapered off prozac in april, still on wellbutrin  mg qday and started on cymbalta 30 mg in place of the Prozac    #6 skin lesions followed by dermatology    Plan  #1 continue with a good nutrition and exercise program, she is already tried phentermine without benefit and had side effects    #2 advised her that since she does not have diabetes, the Ozempic or Mounjaro would not be covered    #3 she does have a history of impaired glucose metabolism, dyslipidemia with mildly elevated triglycerides, and BMI 32.4    #4 I will try sending prescription again to pharmacy for the Ozempic using body mass index of 32.4 as a diagnosis, obesity plus comorbidity with dyslipidemia    #5 provided [facility] where she [facility] potentially get a prescription for the semaglutide but understand if she may have to pay out-of-pocket, if she does start the medication to let me know    #6 follow-up sleep apnea group    #7 old records dermatology

## 2024-05-16 NOTE — LETTER
[facility]  Jordan Rand M.D.  [address]  [place] [address]  Fax: [phone]   Authorization for Release/Disclosure of   Protected Health Information   Name: BRYANT FORTUNE : 1960 SSN: [id number]   Address: [address] [place] [address]  [place] [address] Phone:    [phone] (home)    I authorize the entity listed below to release/disclose the PHI below to:   [facility]/Jordan Rand M.D. and Jordan Rand M.D.   Provider or Entity Name:                                                [facility]   Address   City, [facility], [facility]   Phone:      Fax:                [phone]   Reason for request: continuity of care   Information to be released:        Past 12 mo of records please   [  ] LAST COLONOSCOPY,  including any PATH REPORT and follow-up  [  ] LAST FIT/COLOGUARD RESULT [  ] LAST DEXA  [  ] LAST MAMMOGRAM  [  ] LAST PAP  [  ] LAST LABS [  ] RETINA EXAM REPORT  [  ] IMMUNIZATION RECORDS  [ x ] Release all info      [  ] Check here and initial the line next to each item to release ALL health information INCLUDING  _____ Care and treatment for drug and / or alcohol abuse  _____ HIV testing, infection status, or AIDS  _____ Genetic Testing    DATES OF SERVICE OR TIME PERIOD TO BE DISCLOSED: _____________  I understand and acknowledge that:  * This Authorization may be revoked at any time by you in writing, except if your health information has already been used or disclosed.  * Your health information that will be used or disclosed as a result of you signing this authorization could be re-disclosed by the recipient. If this occurs, your re-disclosed health information may no longer be protected by State or Federal laws.  * You may refuse to sign this Authorization. Your refusal will not affect your ability to obtain treatment.  * This Authorization becomes effective upon signing and will  on (date) __________.      If no date is indicated, this Authorization will   one (1) year from the signature date.    Name: Ramona Wilson  Signature:                     Continuity of Care Date:   5/17/2024     PLEASE FAX REQUESTED RECORDS BACK TO: [phone]

## 2024-05-17 ENCOUNTER — TELEPHONE (OUTPATIENT)
Dept: MEDICAL GROUP | Facility: MEDICAL CENTER | Age: 64
End: 2024-05-17
Payer: COMMERCIAL

## 2024-05-28 ENCOUNTER — TELEPHONE (OUTPATIENT)
Dept: MEDICAL GROUP | Facility: MEDICAL CENTER | Age: 64
End: 2024-05-28
Payer: COMMERCIAL

## 2024-05-28 NOTE — TELEPHONE ENCOUNTER
Please notify the patient that her insurance denied the Ozempic medication since she does not have diabetes they would not cover that medication.

## 2024-05-28 NOTE — TELEPHONE ENCOUNTER
Requesting prior authorization for OZEMPIC   PA Outcome DENIED   Quantity of   for a day supply of     Potential Alternatives are     Insurance [employer]   Reference #? CASE ID [id number]  Dates in effect, from  / /  through  / /   Pharmacy and phone number    Patient Copay

## 2024-09-12 ENCOUNTER — OFFICE VISIT (OUTPATIENT)
Dept: SLEEP MEDICINE | Facility: MEDICAL CENTER | Age: 64
End: 2024-09-12
Attending: PREVENTIVE MEDICINE
Payer: COMMERCIAL

## 2024-09-12 VITALS
HEART RATE: 80 BPM | OXYGEN SATURATION: 94 % | SYSTOLIC BLOOD PRESSURE: 124 MMHG | HEIGHT: 64 IN | DIASTOLIC BLOOD PRESSURE: 80 MMHG | WEIGHT: 175 LBS | RESPIRATION RATE: 16 BRPM | BODY MASS INDEX: 29.88 KG/M2

## 2024-09-12 DIAGNOSIS — G47.33 OSA ON CPAP: ICD-10-CM

## 2024-09-12 PROCEDURE — 99213 OFFICE O/P EST LOW 20 MIN: CPT | Performed by: PREVENTIVE MEDICINE

## 2024-09-12 PROCEDURE — 3079F DIAST BP 80-89 MM HG: CPT | Performed by: PREVENTIVE MEDICINE

## 2024-09-12 PROCEDURE — 3074F SYST BP LT 130 MM HG: CPT | Performed by: PREVENTIVE MEDICINE

## 2024-09-12 PROCEDURE — 99214 OFFICE O/P EST MOD 30 MIN: CPT | Performed by: PREVENTIVE MEDICINE

## 2024-09-12 ASSESSMENT — FIBROSIS 4 INDEX: FIB4 SCORE: 1.02

## 2024-09-12 NOTE — PROGRESS NOTES
CHIEF COMPLAINT: Compliance check/Annual Visit/First Compliance  COLLATERAL:   Luis Alberto  LAST SEEN: Dr. Torres on 7/12/2023  HISTORY OF PRESENT ILLNESS:  Ramona Wilson is a 63 y.o.female   who is here today to follow-up  for SAMARA.      COMPLIANCE DATA greater than 4 hours: 97 %  Machine type:  []ResMed AirSense 10 Autoset   [x]ResMed AirSense 11 Autoset   []ResMed Auto BiPAP   []ResMed BiPAP ST  []ResMed ASV   []ResMed auto ASV  []ResMed iVAPS  []José APAP  []José BiPAP   Date range:  8/13-9/11/2024  AHI: 7.7  TIME USED:  8.5 hrs.  PRESSURE SETTINGS (CWP):  min 11, max 18  LEAK: []NO []Acceptable [x]Large  38.4  DME: [place]  Oxygen Bleed-in: []Yes [x]No    This patient is using PAP therapy consistently and is benefiting from it .    Sleep Study History:    This patient had a sleep study that was done through [facility] on November 30, 2015.  Type of study: Split-night.  AHI 26.8 and REM AHI was 45.4 in the diagnostic phase.  Diagnostic oxygen kalpana was 86.0.  During the treatment phase of the study her AHI was 21.7 and her oxygen kalpana was 87.0.  She was titrated on CPAP from 4 to 8 cm of water     Significant comorbidities and modifying factors: see below    PAST MEDICAL HISTORY:  Past Medical History:   Diagnosis Date    Anxiety 4/21/2009    Blood in urine     Chickenpox     Chronic low back pain 4/21/2009    Constipation     Depression     Diarrhea     Dyslipidemia 4/21/2009    Eczema 4/21/2009    Hypovitaminosis D 4/23/2009    Obesity     RLS (restless legs syndrome)     Sleep apnea     Surveillance for birth control, oral contraceptives 4/21/2009    Wears glasses       PROBLEM LIST:  Patient Active Problem List    Diagnosis Date Noted    Overactive bladder 04/17/2023    Stress incontinence 03/16/2023    History of COVID-19 03/02/2023    Knee arthritis 12/11/2022    History of shoulder pain 08/09/2021    History of c.difficile colitis 03/30/2020    History of UTI 12/26/2019    Impaired fasting glucose  02/15/2017    Obesity 2015    History of elbow fracture, left 2015    Foot arthritis 10/08/2013    S/P cholecystectomy 2010    Sleep apnea 10/15/2010    History of low back pain 2009    Dyslipidemia 2009    Depression 2009    Preventative health care 2009     PAST SOCIAL HISTORY:  Past Surgical History:   Procedure Laterality Date    BREAST BIOPSY Right 2021    benign    [place] BREAST REDUCTION  2011    MAMMOPLASTY REDUCTION  2010    Performed by LUANNE LARRY at SURGERY [facility] ORS    PRIMARY C SECTION          [facility]HE BY LAPAROSCOPY       PAST FAMILY HISTORY:  Family History   Problem Relation Age of Onset    Cancer Mother         lung cancer,osteoporosis    Hypertension Brother     Heart Disease Brother     Alcohol abuse Brother     Diabetes Father     Lung Disease Father     Sleep Apnea Father     Cancer Maternal Aunt     Cancer Maternal Grandmother     Alcohol/Drug Maternal Grandfather     Stroke Paternal Grandfather     Cancer Maternal Aunt      SOCIAL HISTORY:  Social History     Socioeconomic History    Marital status:      Spouse name: Not on file    Number of children: Not on file    Years of education: Not on file    Highest education level: 12th grade   Occupational History    Not on file   Tobacco Use    Smoking status: Never    Smokeless tobacco: Never   Vaping Use    Vaping status: Never Used   Substance and Sexual Activity    Alcohol use: No     Alcohol/week: 0.0 oz    Drug use: No    Sexual activity: Yes     Partners: Male   Other Topics Concern    Not on file   Social History Narrative    Not on file     Social Determinants of Health     Financial Resource Strain: Not on file   Food Insecurity: No Food Insecurity (2023)    Hunger Vital Sign     Worried About Running Out of Food in the Last Year: Never true     Ran Out of Food in the Last Year: Never true   Transportation Needs: No Transportation Needs (2023)     PRAPARE - Transportation     Lack of Transportation (Medical): No     Lack of Transportation (Non-Medical): No   Physical Activity: Sufficiently Active (2/27/2023)    Exercise Vital Sign     Days of Exercise per Week: 5 days     Minutes of Exercise per Session: 70 min   Stress: Stress Concern Present (2/27/2023)    [place] [employer] - Occupational Stress Questionnaire     Feeling of Stress : Very much   Social Connections: Unknown (2/27/2023)    Social Connection and Isolation Panel [NHANES]     Frequency of Communication with Friends and Family: Once a week     Frequency of Social Gatherings with Friends and Family: Patient declined     Attends Jainism Services: Never     Active Member of Clubs or Organizations: No     Attends Club or Organization Meetings: Never     Marital Status:    Intimate Partner Violence: Not on file   Housing Stability: Low Risk  (2/27/2023)    Housing Stability Vital Sign     Unable to Pay for Housing in the Last Year: No     Number of Places Lived in the Last Year: 1     Unstable Housing in the Last Year: No     ALLERGIES: Amoxicillin and Other drug  MEDICATIONS:  Current Outpatient Medications   Medication Sig Dispense Refill    Semaglutide,0.25 or 0.5MG/DOS, (OZEMPIC, 0.25 OR 0.5 MG/DOSE,) 2 MG/3ML Solution Pen-injector Inject 0.25 mg under the skin every 7 days. 3 mL 11    clobetasol (TEMOVATE) 0.05 % external solution APPLY 1 APPLICATION TOPICALLY 2 TIMES A DAY AS NEEDED (RASH). 50 mL 1    triamcinolone acetonide (KENALOG) 0.1 % Cream PLEASE SEE ATTACHED FOR DETAILED DIRECTIONS      buPROPion (WELLBUTRIN XL) 300 MG XL tablet Take 300 mg by mouth every morning.      Omega-3 Fatty Acids (FISH OIL) 1000 MG Cap capsule Take 1,000 mg by mouth 3 times a day, with meals.      Cholecalciferol (VITAMIN D) 1000 UNIT CAPS Take  by mouth every day.      tolterodine ER (DETROL-LA) 2 MG CAPSULE SR 24 HR TAKE 1 CAPSULE BY MOUTH EVERY DAY (Patient not taking:  "Reported on 9/12/2024) 90 Capsule 1     No current facility-administered medications for this visit.    \"CURRENT RX\"    REVIEW OF SYSTEMS:  SEE HPI      PHYSICAL EXAM/VITALS:  /80 (BP Location: Left arm, Patient Position: Sitting, BP Cuff Size: Adult)   Pulse 80   Resp 16   Ht 1.626 m (5' 4\")   Wt 79.4 kg (175 lb)   LMP 10/10/2010   SpO2 94%   BMI 30.04 kg/m²   Appearance: Well-nourished, well-developed,  looks stated age, no acute distress  Eyes:   EOMI  ENMT:  WNL  Neck: Supple, trachea midline  Respiratory effort:  No intercostal retractions or use of accessory muscles  Musculoskeletal:  Grossly normal; gait and station normal  Neurologic:  oriented to person, purpose; judgement intact  Psychiatric:  No depression, anxiety, agitation    MEDICAL DECISION MAKING:  The medical record was reviewed as it pertains to this referral. This includes records from primary care,consultants notes, referral request, hospital records, labs and imaging. Any available diagnostic and titration nocturnal polysomnograms, home sleep apnea tests, continuous nocturnal oximetry results, multiple sleep latency tests, and recent compliance reports were reviewed with the patient.    ASSESSMENT/PLAN:  Ramona Wilson is a 63 y.o.female who is doing reasonably well on PAP therapy.   The elevated AHI should be corrected by the following pressure changes.  Min now is 9, max now is 16 CWP.  Patient was recommended to purchase Ikstar CPAP side sleeper pillow to minimize mask leak.      DIAGNOSES :        1. SAMARA on CPAP  - DME Mask and Supplies        The risks of untreated sleep apnea were discussed with the patient at length. Patients with SAMARA are at increased risk of cardiovascular disease including coronary artery disease, systemic arterial hypertension, pulmonary arterial hypertension, cardiac arrhythmias, and stroke. SAMARA patients have an increased risk of motor vehicle accidents, type 2 diabetes, chronic kidney disease, and " non-alcoholic liver disease. The patient was advised to avoid driving a motor vehicle when drowsy.  Have advised the patient to follow up with the appropriate healthcare practitioners for all other medical problems and issues.    RETURN TO CLINIC: Return in about 6 months (around 3/12/2025) for With Dr Torres, Compliance check.    My total time spent caring for the patient on the day of the encounter was 40 minutes. This includes time spent on a thorough chart review including other physician notes, all sleep studies, as well as critical labs and pulmonary and cardiac studies.  Additionally, it includes a thorough discussion of good sleep hygiene and stimulus control, as well as  the need for consistency in terms of sleep preparation and practice.    Please note that this dictation was created using voice recognition software.  I have made every reasonable attempt to correct obvious errors, I expect that there are errors of grammar and possibly content that I did not discover before finalizing this note.

## 2024-10-09 ENCOUNTER — APPOINTMENT (RX ONLY)
Dept: URBAN - METROPOLITAN AREA CLINIC 35 | Facility: CLINIC | Age: 64
Setting detail: DERMATOLOGY
End: 2024-10-09

## 2024-10-09 DIAGNOSIS — K13.0 DISEASES OF LIPS: ICD-10-CM

## 2024-10-09 DIAGNOSIS — L24 IRRITANT CONTACT DERMATITIS: ICD-10-CM | Status: INADEQUATELY CONTROLLED

## 2024-10-09 PROBLEM — L24.9 IRRITANT CONTACT DERMATITIS, UNSPECIFIED CAUSE: Status: ACTIVE | Noted: 2024-10-09

## 2024-10-09 PROCEDURE — 99213 OFFICE O/P EST LOW 20 MIN: CPT

## 2024-10-09 PROCEDURE — ? TREATMENT REGIMEN

## 2024-10-09 PROCEDURE — ? PRESCRIPTION

## 2024-10-09 PROCEDURE — ? COUNSELING

## 2024-10-09 RX ORDER — HYDROCORTISONE 25 MG/G
THIN LAYER OINTMENT TOPICAL BID
Qty: 28.35 | Refills: 1 | Status: ERX | COMMUNITY
Start: 2024-10-09

## 2024-10-09 RX ORDER — NYSTATIN OINTMENT 100000 [USP'U]/G
THIN LAYER OINTMENT TOPICAL BID
Qty: 30 | Refills: 3 | Status: ERX | COMMUNITY
Start: 2024-10-09

## 2024-10-09 RX ADMIN — HYDROCORTISONE THIN LAYER: 25 OINTMENT TOPICAL at 00:00

## 2024-10-09 RX ADMIN — NYSTATIN OINTMENT THIN LAYER: 100000 OINTMENT TOPICAL at 00:00

## 2024-10-09 ASSESSMENT — LOCATION DETAILED DESCRIPTION DERM
LOCATION DETAILED: RIGHT LOWER CUTANEOUS LIP
LOCATION DETAILED: LEFT SUPERIOR VERMILION LIP
LOCATION DETAILED: LEFT INFERIOR VERMILION LIP
LOCATION DETAILED: RIGHT ORAL COMMISSURE

## 2024-10-09 ASSESSMENT — LOCATION ZONE DERM: LOCATION ZONE: LIP

## 2024-10-09 ASSESSMENT — LOCATION SIMPLE DESCRIPTION DERM
LOCATION SIMPLE: RIGHT LIP
LOCATION SIMPLE: LEFT LIP

## 2024-10-09 NOTE — PROCEDURE: TREATMENT REGIMEN
Initiate Treatment: Hydrocortisone 2.5% bid for 2 weeks alternating with 2 weeks off \\nNystatin ointment bid
Detail Level: Zone
Initiate Treatment: Hydrocortisone and nystatin ointment \\nBoudreaux butt paste and Vaseline

## 2024-10-11 ENCOUNTER — APPOINTMENT (OUTPATIENT)
Dept: RADIOLOGY | Facility: MEDICAL CENTER | Age: 64
End: 2024-10-11
Attending: INTERNAL MEDICINE
Payer: COMMERCIAL

## 2024-10-11 DIAGNOSIS — Z12.31 VISIT FOR SCREENING MAMMOGRAM: ICD-10-CM

## 2024-10-27 DIAGNOSIS — N39.3 STRESS INCONTINENCE: ICD-10-CM

## 2024-10-27 RX ORDER — TOLTERODINE 2 MG/1
2 CAPSULE, EXTENDED RELEASE ORAL DAILY
Qty: 90 CAPSULE | Refills: 1 | Status: SHIPPED | OUTPATIENT
Start: 2024-10-27

## 2024-11-07 ENCOUNTER — APPOINTMENT (OUTPATIENT)
Dept: RADIOLOGY | Facility: MEDICAL CENTER | Age: 64
End: 2024-11-07
Attending: INTERNAL MEDICINE
Payer: COMMERCIAL

## 2024-11-12 ENCOUNTER — OFFICE VISIT (OUTPATIENT)
Dept: MEDICAL GROUP | Facility: MEDICAL CENTER | Age: 64
End: 2024-11-12
Payer: COMMERCIAL

## 2024-11-12 VITALS
HEIGHT: 64 IN | DIASTOLIC BLOOD PRESSURE: 56 MMHG | RESPIRATION RATE: 18 BRPM | WEIGHT: 165.3 LBS | TEMPERATURE: 97.6 F | HEART RATE: 76 BPM | SYSTOLIC BLOOD PRESSURE: 112 MMHG | OXYGEN SATURATION: 95 % | BODY MASS INDEX: 28.22 KG/M2

## 2024-11-12 DIAGNOSIS — R05.9 COUGH, UNSPECIFIED TYPE: ICD-10-CM

## 2024-11-12 DIAGNOSIS — E66.811 CLASS 1 OBESITY WITHOUT SERIOUS COMORBIDITY WITH BODY MASS INDEX (BMI) OF 34.0 TO 34.9 IN ADULT, UNSPECIFIED OBESITY TYPE: ICD-10-CM

## 2024-11-12 DIAGNOSIS — Z00.00 PREVENTATIVE HEALTH CARE: ICD-10-CM

## 2024-11-12 DIAGNOSIS — N32.81 OVERACTIVE BLADDER: ICD-10-CM

## 2024-11-12 DIAGNOSIS — G47.30 SLEEP APNEA, UNSPECIFIED TYPE: ICD-10-CM

## 2024-11-12 DIAGNOSIS — Z86.16 HISTORY OF COVID-19: ICD-10-CM

## 2024-11-12 DIAGNOSIS — R05.1 ACUTE COUGH: ICD-10-CM

## 2024-11-12 LAB
FLUAV RNA SPEC QL NAA+PROBE: NEGATIVE
FLUBV RNA SPEC QL NAA+PROBE: NEGATIVE
RSV RNA SPEC QL NAA+PROBE: NEGATIVE
SARS-COV-2 RNA RESP QL NAA+PROBE: POSITIVE

## 2024-11-12 PROCEDURE — 0241U POCT CEPHEID COV-2, FLU A/B, RSV - PCR: CPT | Performed by: INTERNAL MEDICINE

## 2024-11-12 PROCEDURE — 3078F DIAST BP <80 MM HG: CPT | Performed by: INTERNAL MEDICINE

## 2024-11-12 PROCEDURE — 99214 OFFICE O/P EST MOD 30 MIN: CPT | Performed by: INTERNAL MEDICINE

## 2024-11-12 PROCEDURE — 3074F SYST BP LT 130 MM HG: CPT | Performed by: INTERNAL MEDICINE

## 2024-11-12 RX ORDER — DULOXETIN HYDROCHLORIDE 30 MG/1
CAPSULE, DELAYED RELEASE ORAL
COMMUNITY
Start: 2024-11-11

## 2024-11-12 RX ORDER — BENZONATATE 100 MG/1
100 CAPSULE ORAL 3 TIMES DAILY PRN
Qty: 60 CAPSULE | Refills: 0 | Status: SHIPPED | OUTPATIENT
Start: 2024-11-12

## 2024-11-12 ASSESSMENT — FIBROSIS 4 INDEX: FIB4 SCORE: 1.02

## 2024-11-12 NOTE — PROGRESS NOTES
Subjective     Ramona Wilson is a 63 y.o. female who presents with Other (home Covid test is positive = want to confirm and get Paxlovid)            HPI      Patient has been going every other week to visit her father who was in the hospital in [place], [place], and her father improved and moved in to skilled nursing now last Monday and patient was visiting father last week Friday then this past Saturday she developed sinus congestion and a runny nose, temp up to 101.7, body aches, nausea and emesis, no diarrhea, cough with sputum yellow, then next day temp 100.6, more fatigue.  Her father may have been exposed at skilled nursing to someone with an infectious process but unclear if that was actually COVID.  Her  traveled with her in the same car but did not get sick or develop any symptoms. Patient had an  COVID home testing kit, and yesterday did a COVID swab that was positive.  Issues include 23 rsv last year and this year 10/22/24 flu, she has not had the COVID-vaccine this season.  She still has a bit of cough at night, taking Advil and Tylenol over-the-counter this past week and along with Mucinex.  She continues on CPAP followed by the sleep group most recent visit 24 sleep apnea note continue cpap   Also she [facility] since  and titrated up to 1 mg and doing well less portion sizes and cravings   Still with coughing at night, taking advil and tylenol over the weekend, also mucinex and advil   Mood has remained stable, continues on Wellbutrin and Cymbalta per psychiatry,    No tobacco        Current Outpatient Medications   Medication Sig Dispense Refill    tolterodine ER (DETROL-LA) 2 MG CAPSULE SR 24 HR TAKE 1 CAPSULE BY MOUTH EVERY DAY 90 Capsule 1    Semaglutide,0.25 or 0.5MG/DOS, (OZEMPIC, 0.25 OR 0.5 MG/DOSE,) 2 MG/3ML Solution Pen-injector Inject 0.25 mg under the skin every 7 days. 3 mL 11    clobetasol (TEMOVATE)  0.05 % external solution APPLY 1 APPLICATION TOPICALLY 2 TIMES A DAY AS NEEDED (RASH). 50 mL 1    triamcinolone acetonide (KENALOG) 0.1 % Cream PLEASE SEE ATTACHED FOR DETAILED DIRECTIONS      buPROPion (WELLBUTRIN XL) 300 MG XL tablet Take 300 mg by mouth every morning.      Omega-3 Fatty Acids (FISH OIL) 1000 MG Cap capsule Take 1,000 mg by mouth 3 times a day, with meals.      Cholecalciferol (VITAMIN D) 1000 UNIT CAPS Take  by mouth every day.       No current facility-administered medications for this visit.       Patient Active Problem List   Diagnosis    History of low back pain    Dyslipidemia    Depression    Preventative health care    Sleep apnea    S/P cholecystectomy    Foot arthritis    History of elbow fracture, left    Obesity    Impaired fasting glucose    History of UTI    History of c.difficile colitis    History of shoulder pain    Knee arthritis    History of COVID-19    Stress incontinence    Overactive bladder            Health Maintenance Summary            Overdue - COVID-19 Vaccine (4 - 2024-25 season) Overdue since 9/1/2024 12/03/2021  Imm Admin: PFIZER PURPLE CAP SARS-COV-2 VACCINATION (12+)    05/21/2021  Imm Admin: PFIZER PURPLE CAP SARS-COV-2 VACCINATION (12+)    04/21/2021  Imm Admin: PFIZER PURPLE CAP SARS-COV-2 VACCINATION (12+)              IMM DTaP/Tdap/Td Vaccine (2 - Td or Tdap) Next due on 9/30/2025 09/30/2015  Imm Admin: Tdap Vaccine              Mammogram (Every 2 Years) Next due on 10/10/2025      10/10/2023  MA-SCREENING MAMMO BILAT W/TOMOSYNTHESIS W/CAD    09/22/2022  MA-SCREENING MAMMO BILAT W/TOMOSYNTHESIS W/CAD    09/03/2021  MA-DIAGNOSTIC MAMMO BILAT W/TOMOSYNTHESIS W/CAD    08/10/2020  MA-DIAGNOSTIC MAMMO RIGHT W/TOMOSYNTHESIS W/O CAD    08/06/2020  MA-SCREENING MAMMO BILAT W/TOMOSYNTHESIS W/CAD    Only the first 5 history entries have been loaded, but more history exists.              Cervical Cancer Screening (Every 3 Years) Next due on 3/16/2026       03/16/2023  THINPREP PAP WITH HPV    03/16/2023  Pathology Gynecology Specimen    01/23/2020  Done -  gyn    12/15/2015  Done - PAP SENT TO [facility].    11/20/2014  Previously completed    Only the first 5 history entries have been loaded, but more history exists.              Colorectal Cancer Screening (Colonoscopy - Preferred) Next due on 2/7/2033 02/07/2023  AMB EXTERNAL COLONOSCOPY RESULTS    03/04/2020  OCCULT BLOOD,FECAL,IMMUNOASSAY    05/21/2012  AMB REFERRAL TO GI FOR COLONOSCOPY              Zoster (Shingles) Vaccines (Series Information) Completed      11/02/2022  Imm Admin: Zoster Vaccine Recombinant (RZV) (SHINGRIX)    07/21/2022  Imm Admin: Zoster Vaccine Recombinant (RZV) (SHINGRIX)              Hepatitis C Screening  Completed      11/19/2023  Done    12/03/2021  Hepatitis C Antibody component of HEPATITIS PANEL ACUTE(4 COMPONENTS)              Influenza Vaccine (Series Information) Completed      10/22/2024  Outside Immunization: Influenza Inj MDCK P    11/21/2023  Imm Admin: Influenza Vaccine Quad Inj (Pf)    11/02/2022  Imm Admin: Influenza Vaccine Quad Inj (Pf)    10/06/2021  Imm Admin: Influenza Vaccine Quad Inj (Pf)    10/18/2019  Imm Admin: Influenza Vaccine Quad Inj (Pf)    Only the first 5 history entries have been loaded, but more history exists.              Hepatitis A Vaccine (Hep A) (Series Information) Aged Out      No completion history exists for this topic.              HPV Vaccines (Series Information) Aged Out      No completion history exists for this topic.              Polio Vaccine (Inactivated Polio) (Series Information) Aged Out      No completion history exists for this topic.              Meningococcal Immunization (Series Information) Aged Out      No completion history exists for this topic.              Pneumococcal Vaccine: 0-64 Years (Series Information) Aged Out      No completion history exists for this topic.              Discontinued - HIV Screening   "Discontinued      No completion history exists for this topic.              Discontinued - Hepatitis B Vaccine (Hep B)  Discontinued      No completion history exists for this topic.                    Patient Care Team:  Jordanmakenna Rand M.D. as PCP - [facility] (DME Supplier)        ROS           Objective     /56 (BP Location: Left arm, Patient Position: Sitting)   Pulse 76   Temp 36.4 °C (97.6 °F) (Temporal)   Resp 18   Ht 1.626 m (5' 4\")   Wt 75 kg (165 lb 4.8 oz)   LMP 10/10/2010   SpO2 95%   BMI 28.37 kg/m²      Physical Exam  Vitals and nursing note reviewed.   Constitutional:       Appearance: Normal appearance.   HENT:      Head: Normocephalic and atraumatic.      Right Ear: Tympanic membrane and external ear normal.      Left Ear: Tympanic membrane and external ear normal.      Nose: Congestion present.      Mouth/Throat:      Pharynx: No oropharyngeal exudate or posterior oropharyngeal erythema.   Eyes:      Conjunctiva/sclera: Conjunctivae normal.   Cardiovascular:      Rate and Rhythm: Normal rate and regular rhythm.      Heart sounds: Normal heart sounds. No murmur heard.  Pulmonary:      Effort: No respiratory distress.      Breath sounds: Normal breath sounds.   Abdominal:      General: There is no distension.   Musculoskeletal:         General: No swelling.      Cervical back: Neck supple. No rigidity.   Lymphadenopathy:      Cervical: No cervical adenopathy.   Skin:     Findings: No bruising.   Neurological:      General: No focal deficit present.      Mental Status: She is alert.   Psychiatric:         Mood and Affect: Mood normal.            Assessment & Plan     Assessment  #1 upper respiratory tract symptoms, positive home COVID test although the kit was , perhaps she was exposed to COVID at the skilled nursing facility when she visited her father, on exam lungs are clear, afebrile although she did have a temperature this weekend, normal saturation on room air " currently, no asthma or COPD    #2 sleep apnea continues on CPAP    #3 BMI 28.37 improved [facility]empic [facility], has been on the medication since June, currently up to 1 mg tolerating the medication well without nausea, vomiting, abdominal pain, bloating, prior to starting the medication BMI was 32.4    #4 mood disorder followed by psychiatry stable on Wellbutrin and Cymbalta    #5 overactive bladder on Detrol    Plan  #1 repeat COVID test done today which eventually came back positive    #2 notify the patient about the positive COVID test, she is 5 days out from onset of symptoms so she would benefit from Paxlovid which she has had before for a previous COVID infection a few years ago    #3 reviewed her current medications, no need to dose adjust for the Wellbutrin or duloxetine    #4 however she needs to discontinue the Detrol on the Paxlovid which she will do and stop the Detrol while on the course of Paxlovid    #5 prescription Paxlovid standard dose, she has normal renal function, monitor for worsening symptoms, no side effects previously with Paxlovid, but monitor for nausea, loose stools, rash, body aches, metallic taste, this should not interact with semaglutide    #6 also sent prescription for Tessalon to the pharmacy    #7 continue CPAP    #8 call if no improvement or worsening symptoms

## 2024-11-19 NOTE — TELEPHONE ENCOUNTER
Physician Progress Note      PATIENT:               ERWIN URIARTE  CSN #:                  [id number]  :                       2000  ADMIT DATE:       2024 8:00 PM  DISCH DATE:  RESPONDING  PROVIDER #:        TAMMI FAM          QUERY TEXT:    Patient admitted with BMI 49.7. If possible, please document in progress notes   and discharge summary if you are evaluating and /or treating any of the   following:    The medical record reflects the following:  Risk Factors: possible sedentary lifestyle and poor nutritional awareness.  Clinical Indicators: Per H&P- noted 5'1' and 120.6 kg-  BMI - 49.7.  Acute   cholelithiasis with plan for cholecystectomy - robotic - completed.  Treatment: diet modifications, follow up on diet plan as advancement of diet   post operatively.    Specificity of obesity and morbid obesity should be reported based on   physician documentation, as there are several published classifications and   definitions?  MS-DRG Training Guide. CDC:   https://www.cdc.gov/obesity/basics/adult-defining.html. WHO:   https://www.who.int/news-room/fact-sheets/detail/obesity-and-overweight. NIH:   https://www.nhlbi.nih.gov/health/educational/lose_wt/BMI/bmi_dis.htm  Options provided:  -- Obesity  -- Morbid obesity  -- Severe obesity  -- Overweight  -- BMI not clinically significant  -- Other - I will add my own diagnosis  -- Disagree - Not applicable / Not valid  -- Disagree - Clinically unable to determine / Unknown  -- Refer to Clinical Documentation Reviewer    PROVIDER RESPONSE TEXT:    This patient has morbid obesity.    Query created by: Edil Osborne on 2024 3:20 PM      Electronically signed by:  TAMMI FAM 2024 2:47 PM           I sent a prescription to her pharmacy for the clobetasol     Referral to dermatology placed

## 2024-11-21 ENCOUNTER — APPOINTMENT (OUTPATIENT)
Dept: RADIOLOGY | Facility: MEDICAL CENTER | Age: 64
End: 2024-11-21
Attending: INTERNAL MEDICINE
Payer: COMMERCIAL

## 2024-11-21 DIAGNOSIS — Z12.31 VISIT FOR SCREENING MAMMOGRAM: ICD-10-CM

## 2024-11-21 PROCEDURE — 77067 SCR MAMMO BI INCL CAD: CPT

## 2024-12-05 ENCOUNTER — TELEPHONE (OUTPATIENT)
Dept: MEDICAL GROUP | Facility: MEDICAL CENTER | Age: 64
End: 2024-12-05

## 2024-12-05 ENCOUNTER — HOSPITAL ENCOUNTER (OUTPATIENT)
Facility: MEDICAL CENTER | Age: 64
End: 2024-12-05
Attending: INTERNAL MEDICINE
Payer: COMMERCIAL

## 2024-12-05 DIAGNOSIS — R30.0 DYSURIA: ICD-10-CM

## 2024-12-05 LAB
APPEARANCE UR: CLEAR
BILIRUB UR QL STRIP.AUTO: NEGATIVE
COLOR UR: YELLOW
GLUCOSE UR STRIP.AUTO-MCNC: NEGATIVE MG/DL
KETONES UR STRIP.AUTO-MCNC: NEGATIVE MG/DL
LEUKOCYTE ESTERASE UR QL STRIP.AUTO: NEGATIVE
MICRO URNS: NORMAL
NITRITE UR QL STRIP.AUTO: NEGATIVE
PH UR STRIP.AUTO: 6 [PH] (ref 5–8)
PROT UR QL STRIP: NEGATIVE MG/DL
RBC UR QL AUTO: NEGATIVE
SP GR UR STRIP.AUTO: 1.01
UROBILINOGEN UR STRIP.AUTO-MCNC: 0.2 EU/DL

## 2024-12-05 PROCEDURE — 81003 URINALYSIS AUTO W/O SCOPE: CPT

## 2025-02-27 DIAGNOSIS — R21 RASH: ICD-10-CM

## 2025-02-28 RX ORDER — CLOBETASOL PROPIONATE 0.5 MG/ML
1 SOLUTION TOPICAL 2 TIMES DAILY PRN
Qty: 50 ML | Refills: 1 | Status: SHIPPED | OUTPATIENT
Start: 2025-02-28

## 2025-03-12 ENCOUNTER — APPOINTMENT (OUTPATIENT)
Dept: SLEEP MEDICINE | Facility: MEDICAL CENTER | Age: 65
End: 2025-03-12
Attending: PHYSICIAN ASSISTANT
Payer: COMMERCIAL

## 2025-05-02 ENCOUNTER — HOSPITAL ENCOUNTER (OUTPATIENT)
Dept: LAB | Facility: MEDICAL CENTER | Age: 65
End: 2025-05-02
Attending: INTERNAL MEDICINE
Payer: COMMERCIAL

## 2025-05-02 DIAGNOSIS — R30.0 DYSURIA: ICD-10-CM

## 2025-05-02 PROCEDURE — 81003 URINALYSIS AUTO W/O SCOPE: CPT

## 2025-05-04 ENCOUNTER — RESULTS FOLLOW-UP (OUTPATIENT)
Dept: MEDICAL GROUP | Facility: MEDICAL CENTER | Age: 65
End: 2025-05-04

## 2025-05-30 DIAGNOSIS — N39.3 STRESS INCONTINENCE: ICD-10-CM

## 2025-05-31 RX ORDER — TOLTERODINE 2 MG/1
2 CAPSULE, EXTENDED RELEASE ORAL DAILY
Qty: 90 CAPSULE | Refills: 1 | Status: SHIPPED | OUTPATIENT
Start: 2025-05-31

## 2025-06-04 ENCOUNTER — APPOINTMENT (OUTPATIENT)
Dept: URBAN - METROPOLITAN AREA CLINIC 35 | Facility: CLINIC | Age: 65
Setting detail: DERMATOLOGY
End: 2025-06-04

## 2025-06-04 DIAGNOSIS — Z71.89 OTHER SPECIFIED COUNSELING: ICD-10-CM

## 2025-06-04 DIAGNOSIS — L82.1 OTHER SEBORRHEIC KERATOSIS: ICD-10-CM

## 2025-06-04 DIAGNOSIS — L40.0 PSORIASIS VULGARIS: ICD-10-CM

## 2025-06-04 DIAGNOSIS — L81.4 OTHER MELANIN HYPERPIGMENTATION: ICD-10-CM

## 2025-06-04 DIAGNOSIS — D22 MELANOCYTIC NEVI: ICD-10-CM

## 2025-06-04 PROBLEM — D22.5 MELANOCYTIC NEVI OF TRUNK: Status: ACTIVE | Noted: 2025-06-04

## 2025-06-04 PROCEDURE — ? TREATMENT REGIMEN

## 2025-06-04 PROCEDURE — ? PRESCRIPTION

## 2025-06-04 PROCEDURE — ? COUNSELING

## 2025-06-04 RX ORDER — CLOBETASOL PROPIONATE 0.5 MG/ML
1 SOLUTION TOPICAL BID
Qty: 50 | Refills: 3 | Status: ERX

## 2025-06-04 ASSESSMENT — LOCATION ZONE DERM
LOCATION ZONE: SCALP
LOCATION ZONE: LEG
LOCATION ZONE: EAR
LOCATION ZONE: TRUNK
LOCATION ZONE: FACE

## 2025-06-04 ASSESSMENT — LOCATION DETAILED DESCRIPTION DERM
LOCATION DETAILED: RIGHT SUPERIOR UPPER BACK
LOCATION DETAILED: LEFT INFERIOR CENTRAL MALAR CHEEK
LOCATION DETAILED: LEFT SUPERIOR MEDIAL UPPER BACK
LOCATION DETAILED: RIGHT ANTERIOR PROXIMAL THIGH
LOCATION DETAILED: RIGHT SUPERIOR CRUS OF ANTIHELIX
LOCATION DETAILED: RIGHT SUPERIOR LATERAL BUCCAL CHEEK
LOCATION DETAILED: RIGHT SUPERIOR LATERAL MALAR CHEEK
LOCATION DETAILED: LEFT MEDIAL UPPER BACK
LOCATION DETAILED: MID-OCCIPITAL SCALP
LOCATION DETAILED: RIGHT SUPERIOR MEDIAL FOREHEAD

## 2025-06-04 ASSESSMENT — LOCATION SIMPLE DESCRIPTION DERM
LOCATION SIMPLE: RIGHT CHEEK
LOCATION SIMPLE: RIGHT EAR
LOCATION SIMPLE: RIGHT UPPER BACK
LOCATION SIMPLE: RIGHT FOREHEAD
LOCATION SIMPLE: LEFT CHEEK
LOCATION SIMPLE: POSTERIOR SCALP
LOCATION SIMPLE: LEFT UPPER BACK
LOCATION SIMPLE: RIGHT THIGH

## 2025-07-08 ENCOUNTER — HOSPITAL ENCOUNTER (OUTPATIENT)
Facility: MEDICAL CENTER | Age: 65
End: 2025-07-08
Attending: FAMILY MEDICINE
Payer: COMMERCIAL

## 2025-07-08 ENCOUNTER — OFFICE VISIT (OUTPATIENT)
Dept: MEDICAL GROUP | Facility: MEDICAL CENTER | Age: 65
End: 2025-07-08
Payer: COMMERCIAL

## 2025-07-08 VITALS
SYSTOLIC BLOOD PRESSURE: 114 MMHG | WEIGHT: 148 LBS | HEART RATE: 75 BPM | BODY MASS INDEX: 25.27 KG/M2 | HEIGHT: 64 IN | TEMPERATURE: 97.3 F | OXYGEN SATURATION: 95 % | DIASTOLIC BLOOD PRESSURE: 62 MMHG

## 2025-07-08 DIAGNOSIS — Z01.419 ENCOUNTER FOR WELL WOMAN EXAM WITH ROUTINE GYNECOLOGICAL EXAM: Primary | ICD-10-CM

## 2025-07-08 DIAGNOSIS — Z01.419 ENCOUNTER FOR WELL WOMAN EXAM WITH ROUTINE GYNECOLOGICAL EXAM: ICD-10-CM

## 2025-07-08 PROCEDURE — 87624 HPV HI-RISK TYP POOLED RSLT: CPT

## 2025-07-08 PROCEDURE — 99396 PREV VISIT EST AGE 40-64: CPT | Performed by: FAMILY MEDICINE

## 2025-07-08 PROCEDURE — 3078F DIAST BP <80 MM HG: CPT | Performed by: FAMILY MEDICINE

## 2025-07-08 PROCEDURE — 88142 CYTOPATH C/V THIN LAYER: CPT

## 2025-07-08 PROCEDURE — 3074F SYST BP LT 130 MM HG: CPT | Performed by: FAMILY MEDICINE

## 2025-07-08 ASSESSMENT — FIBROSIS 4 INDEX: FIB4 SCORE: 1.03

## 2025-07-08 NOTE — PROGRESS NOTES
Verbal consent was acquired by the patient to use [employer] ambient listening note generation during this visit Yes    Subjective:     CC:   Chief Complaint   Patient presents with    Gynecologic Exam       HPI:   Ramona Wilson is a 64 y.o. female who presents for annual exam    History of Present Illness  The patient is a 64-year-old individual here for their annual Pap smear.    Pregnancy History  - History of 4 pregnancies: 1 stillbirth, 3 successful births with children over 9 pounds, one over 10 pounds.    Pap Smear History  - Last Pap smear in , no abnormal results since an incident 40 years ago.    Hormone Replacement Therapy  - No hormone replacement therapy.    Symptoms  - No symptoms: hot flashes, night sweats, mood changes, vaginal dryness, bloating, pelvic pain, abnormal vaginal discharge, breast tenderness, lumps, masses, or urinary incontinence.    Medication  - On medication for bladder spasms, uncertain duration.    Supplemental information: None.  Ob-Gyn/ History:    Patient has GYN provider: no  /Para:   Last Pap Smear: Due. Positive history of abnormal pap smears, but 40 years ago  Gyn Surgery: C section.  Patient's last menstrual period was 10/10/2010.  She has not utilized hormone replacement therapy.  Denies any menopausal symptoms.  No significant bloating/fluid retention, pelvic pain, or dyspareunia. No abnormal vaginal discharge.   No breast tenderness, mass, nipple discharge or changes in size or contour.  Urinary incontinence: None    OB History    Para Term  AB Living   4 4 4 0 0 3   SAB IAB Ectopic Molar Multiple Live Births   0 0 0 0 0 0      She  reports being sexually active and has had partner(s) who are male.    She  has a past medical history of Allergy, Anxiety (2009), Blood in urine, Chickenpox, Chronic low back pain (2009), Constipation, Depression, Diarrhea, Dyslipidemia (2009), Eczema (2009), Hypovitaminosis D  "(04/23/2009), Obesity, RLS (restless legs syndrome), Sleep apnea, Surveillance for birth control, oral contraceptives (04/21/2009), and Wears glasses.  She  has a past surgical history that includes primary c section (1994); lynne by laparoscopy (1993); mammoplasty reduction (12/22/2010); pr breast reduction (2011); breast biopsy (Right, 08/12/2021); and us-needle core bx-breast panel (09/2021).    Family History   Problem Relation Age of Onset    Cancer Mother         lung cancer,osteoporosis    Hypertension Brother     Heart Disease Brother     Alcohol abuse Brother     Diabetes Father     Lung Disease Father     Sleep Apnea Father     Cancer Maternal Aunt     Cancer Maternal Grandmother     Alcohol/Drug Maternal Grandfather     Stroke Paternal Grandfather     Cancer Maternal Aunt     Autism Son      Social History[1]    Patient Active Problem List    Diagnosis Date Noted    Overactive bladder 04/17/2023    Stress incontinence 03/16/2023    History of COVID-19 03/02/2023    Knee arthritis 12/11/2022    History of shoulder pain 08/09/2021    History of c.difficile colitis 03/30/2020    History of UTI 12/26/2019    Impaired fasting glucose 02/15/2017    Obesity 09/30/2015    History of elbow fracture, left 03/30/2015    Foot arthritis 10/08/2013    S/P cholecystectomy 12/07/2010    Sleep apnea 10/15/2010    History of low back pain 04/21/2009    Dyslipidemia 04/21/2009    Depression 04/21/2009    Preventative health care 04/21/2009     Current Medications[2]  Allergies[3]      Objective:   /62   Pulse 75   Temp 36.3 °C (97.3 °F)   Ht 1.626 m (5' 4\")   Wt 67.1 kg (148 lb)   LMP 10/10/2010   SpO2 95%   BMI 25.40 kg/m²     Wt Readings from Last 4 Encounters:   07/08/25 67.1 kg (148 lb)   11/12/24 75 kg (165 lb 4.8 oz)   09/12/24 79.4 kg (175 lb)   05/16/24 85.7 kg (189 lb)       A chaperone was offered to the patient during today's exam. Chaperone name: Rashawn CELIS was present.    Physical " Exam  Constitutional:       General: She is not in acute distress.  HENT:      Head: Normocephalic and atraumatic.   Eyes:      Conjunctiva/sclera: Conjunctivae normal.      Pupils: Pupils are equal, round, and reactive to light.   Pulmonary:      Effort: Pulmonary effort is normal. No respiratory distress.   Abdominal:      General: There is no distension.   Genitourinary:     Labia:         Right: No rash, tenderness or lesion.         Left: No rash, tenderness or lesion.       Vagina: Normal.      Cervix: Friability present.      Uterus: Normal.       Adnexa: Right adnexa normal and left adnexa normal.   Musculoskeletal:      Cervical back: Normal range of motion and neck supple.   Skin:     General: Skin is warm and dry.      Findings: No rash.   Neurological:      Mental Status: She is alert and oriented to person, place, and time.      Gait: Gait is intact.   Psychiatric:         Mood and Affect: Affect normal.         Assessment and Plan:        Assessment & Plan  1. Well-woman exam: Stable. Last Pap in 2023, history of abnormal Pap 40 years ago.  - Perform Pap smear today  - Continue medication for bladder spasms, effective with no significant side effects    Follow-up  - Follow-up as needed  Ramona was seen today for gynecologic exam.    Diagnoses and all orders for this visit:    Encounter for well woman exam with routine gynecological exam  -     THINPREP PAP WITH HPV; Future       Follow-up: Return if symptoms worsen or fail to improve.       Please note that this dictation was created using voice recognition software. I have made every reasonable attempt to correct obvious errors, but I expect that there are errors of grammar and possibly content that I did not discover before finalizing the note.           [1]   Social History  Tobacco Use    Smoking status: Never    Smokeless tobacco: Never   Vaping Use    Vaping status: Never Used   Substance Use Topics    Alcohol use: No     Alcohol/week: 0.0 oz     Drug use: No   [2]   Current Outpatient Medications   Medication Sig Dispense Refill    tolterodine ER (DETROL-LA) 2 MG CAPSULE SR 24 HR TAKE 1 CAPSULE BY MOUTH EVERY DAY 90 Capsule 1    clobetasol (TEMOVATE) 0.05 % external solution APPLY 1 APPLICATION TOPICALLY 2 TIMES A DAY AS NEEDED (RASH). 50 mL 1    DULoxetine (CYMBALTA) 30 MG Cap DR Particles       benzonatate (TESSALON) 100 MG Cap Take 1 Capsule by mouth 3 times a day as needed for Cough. 60 Capsule 0    Nirmatrelvir&Ritonavir 300/100 20 x 150 MG & 10 x 100MG Tablet Therapy Pack Take 300 mg nirmatrelvir with 100 mg ritonavir, bid x 5 days, stop the tolterodine while taking paxlovid 30 Each 0    Semaglutide,0.25 or 0.5MG/DOS, (OZEMPIC, 0.25 OR 0.5 MG/DOSE,) 2 MG/3ML Solution Pen-injector Inject 0.25 mg under the skin every 7 days. 3 mL 11    triamcinolone acetonide (KENALOG) 0.1 % Cream PLEASE SEE ATTACHED FOR DETAILED DIRECTIONS      buPROPion (WELLBUTRIN XL) 300 MG XL tablet Take 300 mg by mouth every morning.      Omega-3 Fatty Acids (FISH OIL) 1000 MG Cap capsule Take 1,000 mg by mouth 3 times a day, with meals.      Cholecalciferol (VITAMIN D) 1000 UNIT CAPS Take  by mouth every day.       No current facility-administered medications for this visit.   [3]   Allergies  Allergen Reactions    Amoxicillin Hives    Other Drug      Cillin Family

## 2025-07-15 ENCOUNTER — RESULTS FOLLOW-UP (OUTPATIENT)
Dept: MEDICAL GROUP | Facility: MEDICAL CENTER | Age: 65
End: 2025-07-15
Payer: COMMERCIAL

## 2025-07-15 LAB
HPV I/H RISK 1 DNA SPEC QL NAA+PROBE: NOT DETECTED
SPECIMEN SOURCE: NORMAL
THINPREP PAP, CYTOLOGY NL11781: NORMAL